# Patient Record
Sex: FEMALE | Race: WHITE | HISPANIC OR LATINO | Employment: UNEMPLOYED | URBAN - METROPOLITAN AREA
[De-identification: names, ages, dates, MRNs, and addresses within clinical notes are randomized per-mention and may not be internally consistent; named-entity substitution may affect disease eponyms.]

---

## 2017-01-03 ENCOUNTER — ALLSCRIPTS OFFICE VISIT (OUTPATIENT)
Dept: OTHER | Facility: OTHER | Age: 62
End: 2017-01-03

## 2017-01-03 ENCOUNTER — APPOINTMENT (OUTPATIENT)
Dept: LAB | Facility: HOSPITAL | Age: 62
End: 2017-01-03
Payer: COMMERCIAL

## 2017-01-03 DIAGNOSIS — N20.0 CALCULUS OF KIDNEY: ICD-10-CM

## 2017-01-03 LAB
BACTERIA UR QL AUTO: ABNORMAL /HPF
BILIRUB UR QL STRIP: NEGATIVE
BILIRUB UR QL STRIP: NEGATIVE
CLARITY UR: ABNORMAL
CLARITY UR: NORMAL
COLOR UR: ABNORMAL
COLOR UR: NORMAL
GLUCOSE (HISTORICAL): NEGATIVE
GLUCOSE UR STRIP-MCNC: NEGATIVE MG/DL
HGB UR QL STRIP.AUTO: ABNORMAL
HGB UR QL STRIP.AUTO: NORMAL
KETONES UR STRIP-MCNC: NEGATIVE MG/DL
KETONES UR STRIP-MCNC: NEGATIVE MG/DL
LEUKOCYTE ESTERASE UR QL STRIP: ABNORMAL
LEUKOCYTE ESTERASE UR QL STRIP: NORMAL
NITRITE UR QL STRIP: NEGATIVE
NITRITE UR QL STRIP: NEGATIVE
NON-SQ EPI CELLS URNS QL MICRO: ABNORMAL /HPF
PH UR STRIP.AUTO: 5.5 [PH] (ref 4.5–8)
PH UR STRIP.AUTO: 6 [PH]
PROT UR STRIP-MCNC: ABNORMAL MG/DL
PROT UR STRIP-MCNC: NORMAL MG/DL
RBC #/AREA URNS AUTO: ABNORMAL /HPF
SP GR UR STRIP.AUTO: 1.01 (ref 1–1.03)
SP GR UR STRIP.AUTO: 1.02
UROBILINOGEN UR QL STRIP.AUTO: 0.2
UROBILINOGEN UR QL STRIP.AUTO: 0.2 E.U./DL
WBC #/AREA URNS AUTO: ABNORMAL /HPF

## 2017-01-03 PROCEDURE — 87186 SC STD MICRODIL/AGAR DIL: CPT

## 2017-01-03 PROCEDURE — 87077 CULTURE AEROBIC IDENTIFY: CPT

## 2017-01-03 PROCEDURE — 81001 URINALYSIS AUTO W/SCOPE: CPT

## 2017-01-03 PROCEDURE — 87086 URINE CULTURE/COLONY COUNT: CPT

## 2017-01-05 LAB — BACTERIA UR CULT: NORMAL

## 2017-01-11 ENCOUNTER — HOSPITAL ENCOUNTER (OUTPATIENT)
Dept: NON INVASIVE DIAGNOSTICS | Facility: CLINIC | Age: 62
Discharge: HOME | End: 2017-01-11
Payer: COMMERCIAL

## 2017-01-11 DIAGNOSIS — R93.1 ABNORMAL ECHOCARDIOGRAM: ICD-10-CM

## 2017-01-11 LAB
CHEST PAIN STATEMENT: NORMAL
MAX DIASTOLIC BP: 78 MMHG
MAX HEART RATE: 101 BPM
MAX PREDICTED HEART RATE: 157 BPM
MAX. SYSTOLIC BP: 134 MMHG
PROTOCOL NAME: NORMAL
REASON FOR TERMINATION: NORMAL
TARGET HR FORMULA: NORMAL
TEST INDICATION: NORMAL
TIME IN EXERCISE PHASE: 180 S

## 2017-01-11 PROCEDURE — 78452 HT MUSCLE IMAGE SPECT MULT: CPT

## 2017-01-11 PROCEDURE — A9502 TC99M TETROFOSMIN: HCPCS

## 2017-01-11 PROCEDURE — 93017 CV STRESS TEST TRACING ONLY: CPT

## 2017-01-11 RX ADMIN — REGADENOSON 0.4 MG: 0.08 INJECTION, SOLUTION INTRAVENOUS at 14:30

## 2017-01-12 ENCOUNTER — ALLSCRIPTS OFFICE VISIT (OUTPATIENT)
Dept: OTHER | Facility: OTHER | Age: 62
End: 2017-01-12

## 2017-01-12 ENCOUNTER — GENERIC CONVERSION - ENCOUNTER (OUTPATIENT)
Dept: OTHER | Facility: OTHER | Age: 62
End: 2017-01-12

## 2017-01-16 ENCOUNTER — ALLSCRIPTS OFFICE VISIT (OUTPATIENT)
Dept: OTHER | Facility: OTHER | Age: 62
End: 2017-01-16

## 2017-02-10 ENCOUNTER — ALLSCRIPTS OFFICE VISIT (OUTPATIENT)
Dept: OTHER | Facility: OTHER | Age: 62
End: 2017-02-10

## 2017-02-13 ENCOUNTER — TRANSCRIBE ORDERS (OUTPATIENT)
Dept: ADMINISTRATIVE | Facility: HOSPITAL | Age: 62
End: 2017-02-13

## 2017-02-13 DIAGNOSIS — R91.1 COIN LESION: Primary | ICD-10-CM

## 2017-02-21 ENCOUNTER — OFFICE VISIT (OUTPATIENT)
Dept: LAB | Facility: CLINIC | Age: 62
End: 2017-02-21
Payer: COMMERCIAL

## 2017-02-21 ENCOUNTER — TRANSCRIBE ORDERS (OUTPATIENT)
Dept: LAB | Facility: CLINIC | Age: 62
End: 2017-02-21

## 2017-02-21 ENCOUNTER — APPOINTMENT (OUTPATIENT)
Dept: LAB | Facility: CLINIC | Age: 62
End: 2017-02-21
Payer: COMMERCIAL

## 2017-02-21 ENCOUNTER — APPOINTMENT (OUTPATIENT)
Dept: PREADMISSION TESTING | Facility: HOSPITAL | Age: 62
End: 2017-02-21
Payer: COMMERCIAL

## 2017-02-21 VITALS
HEIGHT: 62 IN | BODY MASS INDEX: 24.48 KG/M2 | WEIGHT: 133 LBS | HEART RATE: 65 BPM | DIASTOLIC BLOOD PRESSURE: 68 MMHG | SYSTOLIC BLOOD PRESSURE: 107 MMHG | TEMPERATURE: 98.1 F

## 2017-02-21 DIAGNOSIS — N20.0 URIC ACID NEPHROLITHIASIS: Primary | ICD-10-CM

## 2017-02-21 DIAGNOSIS — N20.0 URIC ACID NEPHROLITHIASIS: ICD-10-CM

## 2017-02-21 LAB
ANION GAP SERPL CALCULATED.3IONS-SCNC: 8 MMOL/L (ref 4–13)
APTT PPP: 44 SECONDS (ref 24–36)
ATRIAL RATE: 78 BPM
ATRIAL RATE: 78 BPM
BASOPHILS # BLD AUTO: 0.02 THOUSANDS/ΜL (ref 0–0.1)
BASOPHILS NFR BLD AUTO: 0 % (ref 0–1)
BUN SERPL-MCNC: 10 MG/DL (ref 5–25)
CALCIUM SERPL-MCNC: 9.3 MG/DL (ref 8.3–10.1)
CHLORIDE SERPL-SCNC: 97 MMOL/L (ref 100–108)
CO2 SERPL-SCNC: 28 MMOL/L (ref 21–32)
CREAT SERPL-MCNC: 1.05 MG/DL (ref 0.6–1.3)
EOSINOPHIL # BLD AUTO: 0.02 THOUSAND/ΜL (ref 0–0.61)
EOSINOPHIL NFR BLD AUTO: 0 % (ref 0–6)
ERYTHROCYTE [DISTWIDTH] IN BLOOD BY AUTOMATED COUNT: 14 % (ref 11.6–15.1)
GFR SERPL CREATININE-BSD FRML MDRD: 52.9 ML/MIN/1.73SQ M
GLUCOSE SERPL-MCNC: 298 MG/DL (ref 65–140)
HCT VFR BLD AUTO: 31.3 % (ref 34.8–46.1)
HGB BLD-MCNC: 9.9 G/DL (ref 11.5–15.4)
INR PPP: 1.17 (ref 0.86–1.16)
LYMPHOCYTES # BLD AUTO: 1.08 THOUSANDS/ΜL (ref 0.6–4.47)
LYMPHOCYTES NFR BLD AUTO: 10 % (ref 14–44)
MCH RBC QN AUTO: 27.3 PG (ref 26.8–34.3)
MCHC RBC AUTO-ENTMCNC: 31.6 G/DL (ref 31.4–37.4)
MCV RBC AUTO: 86 FL (ref 82–98)
MONOCYTES # BLD AUTO: 0.79 THOUSAND/ΜL (ref 0.17–1.22)
MONOCYTES NFR BLD AUTO: 7 % (ref 4–12)
NEUTROPHILS # BLD AUTO: 9.05 THOUSANDS/ΜL (ref 1.85–7.62)
NEUTS SEG NFR BLD AUTO: 83 % (ref 43–75)
P AXIS: 45 DEGREES
P AXIS: 45 DEGREES
PLATELET # BLD AUTO: 292 THOUSANDS/UL (ref 149–390)
PMV BLD AUTO: 11.1 FL (ref 8.9–12.7)
POTASSIUM SERPL-SCNC: 4.6 MMOL/L (ref 3.5–5.3)
PR INTERVAL: 152 MS
PR INTERVAL: 162 MS
PROTHROMBIN TIME: 14.6 SECONDS (ref 12–14.3)
QRS AXIS: 38 DEGREES
QRS AXIS: 40 DEGREES
QRSD INTERVAL: 78 MS
QRSD INTERVAL: 82 MS
QT INTERVAL: 428 MS
QT INTERVAL: 454 MS
QTC INTERVAL: 487 MS
QTC INTERVAL: 517 MS
RBC # BLD AUTO: 3.63 MILLION/UL (ref 3.81–5.12)
SODIUM SERPL-SCNC: 133 MMOL/L (ref 136–145)
T WAVE AXIS: 65 DEGREES
T WAVE AXIS: 72 DEGREES
VENTRICULAR RATE: 78 BPM
VENTRICULAR RATE: 78 BPM
WBC # BLD AUTO: 10.96 THOUSAND/UL (ref 4.31–10.16)

## 2017-02-21 PROCEDURE — 85730 THROMBOPLASTIN TIME PARTIAL: CPT

## 2017-02-21 PROCEDURE — 85610 PROTHROMBIN TIME: CPT

## 2017-02-21 PROCEDURE — 93005 ELECTROCARDIOGRAM TRACING: CPT

## 2017-02-21 PROCEDURE — 85025 COMPLETE CBC W/AUTO DIFF WBC: CPT

## 2017-02-21 PROCEDURE — 80048 BASIC METABOLIC PNL TOTAL CA: CPT

## 2017-02-21 PROCEDURE — 36415 COLL VENOUS BLD VENIPUNCTURE: CPT

## 2017-02-21 RX ORDER — NAPROXEN 500 MG/1
500 TABLET ORAL 2 TIMES DAILY WITH MEALS
COMMUNITY
End: 2017-04-09

## 2017-02-21 RX ORDER — OXYCODONE HYDROCHLORIDE 5 MG/1
5 TABLET ORAL EVERY 4 HOURS PRN
COMMUNITY
End: 2017-04-17 | Stop reason: ALTCHOICE

## 2017-02-21 RX ORDER — GABAPENTIN 800 MG/1
800 TABLET ORAL 3 TIMES DAILY
COMMUNITY

## 2017-02-21 RX ORDER — DICYCLOMINE HYDROCHLORIDE 10 MG/1
10 CAPSULE ORAL 2 TIMES DAILY
COMMUNITY

## 2017-02-21 RX ORDER — NABUMETONE 750 MG/1
750 TABLET, FILM COATED ORAL 2 TIMES DAILY
Status: ON HOLD | COMMUNITY
End: 2017-03-01 | Stop reason: ALTCHOICE

## 2017-02-21 RX ORDER — MIRTAZAPINE 7.5 MG/1
30 TABLET, FILM COATED ORAL
Status: ON HOLD | COMMUNITY
End: 2017-04-25

## 2017-02-21 RX ORDER — FUROSEMIDE 20 MG/1
20 TABLET ORAL 2 TIMES DAILY
COMMUNITY
End: 2017-04-25 | Stop reason: HOSPADM

## 2017-02-22 ENCOUNTER — TRANSCRIBE ORDERS (OUTPATIENT)
Dept: ADMINISTRATIVE | Facility: HOSPITAL | Age: 62
End: 2017-02-22

## 2017-02-22 ENCOUNTER — HOSPITAL ENCOUNTER (OUTPATIENT)
Dept: CT IMAGING | Facility: HOSPITAL | Age: 62
Discharge: HOME | End: 2017-02-22
Payer: COMMERCIAL

## 2017-02-22 DIAGNOSIS — R91.1 COIN LESION: ICD-10-CM

## 2017-02-22 PROCEDURE — 71250 CT THORAX DX C-: CPT

## 2017-02-24 ENCOUNTER — ALLSCRIPTS OFFICE VISIT (OUTPATIENT)
Dept: OTHER | Facility: OTHER | Age: 62
End: 2017-02-24

## 2017-03-01 ENCOUNTER — HOSPITAL ENCOUNTER (OUTPATIENT)
Facility: HOSPITAL | Age: 62
Setting detail: OUTPATIENT SURGERY
Discharge: HOME | End: 2017-03-01
Attending: UROLOGY | Admitting: UROLOGY
Payer: COMMERCIAL

## 2017-03-01 ENCOUNTER — APPOINTMENT (OUTPATIENT)
Dept: RADIOLOGY | Facility: HOSPITAL | Age: 62
End: 2017-03-01
Payer: COMMERCIAL

## 2017-03-01 ENCOUNTER — ANESTHESIA EVENT (OUTPATIENT)
Dept: PERIOP | Facility: HOSPITAL | Age: 62
End: 2017-03-01
Payer: COMMERCIAL

## 2017-03-01 ENCOUNTER — ANESTHESIA (OUTPATIENT)
Dept: PERIOP | Facility: HOSPITAL | Age: 62
End: 2017-03-01
Payer: COMMERCIAL

## 2017-03-01 VITALS
HEIGHT: 62 IN | SYSTOLIC BLOOD PRESSURE: 147 MMHG | DIASTOLIC BLOOD PRESSURE: 70 MMHG | HEART RATE: 72 BPM | TEMPERATURE: 97.1 F | RESPIRATION RATE: 20 BRPM | OXYGEN SATURATION: 97 %

## 2017-03-01 DIAGNOSIS — N20.0 CALCULUS OF KIDNEY: ICD-10-CM

## 2017-03-01 LAB
GLUCOSE SERPL-MCNC: 337 MG/DL (ref 65–140)
GLUCOSE SERPL-MCNC: 338 MG/DL (ref 65–140)
GLUCOSE SERPL-MCNC: 93 MG/DL (ref 65–140)

## 2017-03-01 PROCEDURE — 71010 HB CHEST X-RAY 1 VIEW FRONTAL (PORTABLE): CPT

## 2017-03-01 PROCEDURE — 74420 UROGRAPHY RTRGR +-KUB: CPT

## 2017-03-01 PROCEDURE — 74000 HB X-RAY EXAM OF ABDOMEN (SINGLE ANTEROPOSTERIOR VIEW): CPT

## 2017-03-01 PROCEDURE — C1758 CATHETER, URETERAL: HCPCS | Performed by: UROLOGY

## 2017-03-01 PROCEDURE — C2617 STENT, NON-COR, TEM W/O DEL: HCPCS | Performed by: UROLOGY

## 2017-03-01 PROCEDURE — C1769 GUIDE WIRE: HCPCS | Performed by: UROLOGY

## 2017-03-01 PROCEDURE — 82948 REAGENT STRIP/BLOOD GLUCOSE: CPT

## 2017-03-01 PROCEDURE — 82360 CALCULUS ASSAY QUANT: CPT | Performed by: UROLOGY

## 2017-03-01 DEVICE — STENT URETERAL 6FR 24CML INLAY OPTIMA: Type: IMPLANTABLE DEVICE | Site: URETER | Status: FUNCTIONAL

## 2017-03-01 RX ORDER — HYDROCODONE BITARTRATE AND ACETAMINOPHEN 5; 325 MG/1; MG/1
TABLET ORAL
Status: DISCONTINUED
Start: 2017-03-01 | End: 2017-03-01 | Stop reason: HOSPADM

## 2017-03-01 RX ORDER — SODIUM CHLORIDE, SODIUM LACTATE, POTASSIUM CHLORIDE, CALCIUM CHLORIDE 600; 310; 30; 20 MG/100ML; MG/100ML; MG/100ML; MG/100ML
50 INJECTION, SOLUTION INTRAVENOUS CONTINUOUS
Status: DISCONTINUED | OUTPATIENT
Start: 2017-03-01 | End: 2017-03-01 | Stop reason: HOSPADM

## 2017-03-01 RX ORDER — FENTANYL CITRATE 50 UG/ML
INJECTION, SOLUTION INTRAMUSCULAR; INTRAVENOUS AS NEEDED
Status: DISCONTINUED | OUTPATIENT
Start: 2017-03-01 | End: 2017-03-01 | Stop reason: SURG

## 2017-03-01 RX ORDER — SODIUM CHLORIDE, SODIUM LACTATE, POTASSIUM CHLORIDE, CALCIUM CHLORIDE 600; 310; 30; 20 MG/100ML; MG/100ML; MG/100ML; MG/100ML
INJECTION, SOLUTION INTRAVENOUS CONTINUOUS PRN
Status: DISCONTINUED | OUTPATIENT
Start: 2017-03-01 | End: 2017-03-01 | Stop reason: SURG

## 2017-03-01 RX ORDER — MAGNESIUM HYDROXIDE 1200 MG/15ML
LIQUID ORAL AS NEEDED
Status: DISCONTINUED | OUTPATIENT
Start: 2017-03-01 | End: 2017-03-01 | Stop reason: HOSPADM

## 2017-03-01 RX ORDER — LIDOCAINE HYDROCHLORIDE 10 MG/ML
INJECTION, SOLUTION EPIDURAL; INFILTRATION; INTRACAUDAL; PERINEURAL
Status: DISCONTINUED
Start: 2017-03-01 | End: 2017-03-01 | Stop reason: HOSPADM

## 2017-03-01 RX ORDER — ONDANSETRON 2 MG/ML
INJECTION INTRAMUSCULAR; INTRAVENOUS AS NEEDED
Status: DISCONTINUED | OUTPATIENT
Start: 2017-03-01 | End: 2017-03-01 | Stop reason: SURG

## 2017-03-01 RX ORDER — ONDANSETRON 2 MG/ML
4 INJECTION INTRAMUSCULAR; INTRAVENOUS EVERY 4 HOURS PRN
Status: DISCONTINUED | OUTPATIENT
Start: 2017-03-01 | End: 2017-03-01 | Stop reason: HOSPADM

## 2017-03-01 RX ORDER — PROPOFOL 10 MG/ML
INJECTION, EMULSION INTRAVENOUS AS NEEDED
Status: DISCONTINUED | OUTPATIENT
Start: 2017-03-01 | End: 2017-03-01 | Stop reason: SURG

## 2017-03-01 RX ORDER — FENTANYL CITRATE/PF 50 MCG/ML
25 SYRINGE (ML) INJECTION
Status: COMPLETED | OUTPATIENT
Start: 2017-03-01 | End: 2017-03-01

## 2017-03-01 RX ORDER — HYDROCODONE BITARTRATE AND ACETAMINOPHEN 5; 325 MG/1; MG/1
1 TABLET ORAL EVERY 4 HOURS PRN
Status: DISCONTINUED | OUTPATIENT
Start: 2017-03-01 | End: 2017-03-01 | Stop reason: HOSPADM

## 2017-03-01 RX ORDER — ALBUTEROL SULFATE 2.5 MG/3ML
2.5 SOLUTION RESPIRATORY (INHALATION) ONCE
Status: COMPLETED | OUTPATIENT
Start: 2017-03-01 | End: 2017-03-01

## 2017-03-01 RX ORDER — AMMONIUM LACTATE 12 G/100G
LOTION TOPICAL AS NEEDED
COMMUNITY

## 2017-03-01 RX ORDER — CEPHALEXIN 500 MG/1
500 CAPSULE ORAL 4 TIMES DAILY
Qty: 12 CAPSULE | Refills: 0 | Status: SHIPPED | OUTPATIENT
Start: 2017-03-01 | End: 2017-03-04

## 2017-03-01 RX ORDER — METOPROLOL SUCCINATE 25 MG/1
25 TABLET, EXTENDED RELEASE ORAL 2 TIMES DAILY
COMMUNITY
End: 2017-04-03

## 2017-03-01 RX ORDER — PANTOPRAZOLE SODIUM 40 MG/1
40 TABLET, DELAYED RELEASE ORAL DAILY
COMMUNITY
End: 2017-04-17

## 2017-03-01 RX ORDER — TRAMADOL HYDROCHLORIDE 50 MG/1
50 TABLET ORAL EVERY 6 HOURS PRN
COMMUNITY
End: 2017-04-09

## 2017-03-01 RX ORDER — HYDROCODONE BITARTRATE AND ACETAMINOPHEN 5; 325 MG/1; MG/1
1 TABLET ORAL EVERY 4 HOURS PRN
Qty: 30 TABLET | Refills: 0 | Status: SHIPPED | OUTPATIENT
Start: 2017-03-01 | End: 2017-03-11

## 2017-03-01 RX ADMIN — SODIUM CHLORIDE, SODIUM LACTATE, POTASSIUM CHLORIDE, AND CALCIUM CHLORIDE: .6; .31; .03; .02 INJECTION, SOLUTION INTRAVENOUS at 12:54

## 2017-03-01 RX ADMIN — HYDROCODONE BITARTRATE AND ACETAMINOPHEN 1 TABLET: 5; 325 TABLET ORAL at 16:16

## 2017-03-01 RX ADMIN — FENTANYL CITRATE 25 MCG: 50 INJECTION INTRAMUSCULAR; INTRAVENOUS at 13:52

## 2017-03-01 RX ADMIN — FENTANYL CITRATE 25 MCG: 50 INJECTION INTRAMUSCULAR; INTRAVENOUS at 13:36

## 2017-03-01 RX ADMIN — FENTANYL CITRATE 25 MCG: 50 INJECTION, SOLUTION INTRAMUSCULAR; INTRAVENOUS at 15:14

## 2017-03-01 RX ADMIN — INSULIN HUMAN 6 UNITS: 100 INJECTION, SOLUTION PARENTERAL at 13:23

## 2017-03-01 RX ADMIN — PROPOFOL 150 MG: 10 INJECTION, EMULSION INTRAVENOUS at 13:06

## 2017-03-01 RX ADMIN — FENTANYL CITRATE 25 MCG: 50 INJECTION INTRAMUSCULAR; INTRAVENOUS at 13:12

## 2017-03-01 RX ADMIN — FENTANYL CITRATE 25 MCG: 50 INJECTION, SOLUTION INTRAMUSCULAR; INTRAVENOUS at 15:21

## 2017-03-01 RX ADMIN — ONDANSETRON 4 MG: 2 INJECTION INTRAMUSCULAR; INTRAVENOUS at 15:00

## 2017-03-01 RX ADMIN — FENTANYL CITRATE 25 MCG: 50 INJECTION INTRAMUSCULAR; INTRAVENOUS at 13:15

## 2017-03-01 RX ADMIN — PROPOFOL 100 MG: 10 INJECTION, EMULSION INTRAVENOUS at 13:36

## 2017-03-01 RX ADMIN — PROPOFOL 50 MG: 10 INJECTION, EMULSION INTRAVENOUS at 13:52

## 2017-03-01 RX ADMIN — HEPARIN 300 UNITS: 100 SYRINGE at 17:01

## 2017-03-01 RX ADMIN — ALBUTEROL SULFATE 2.5 MG: 2.5 SOLUTION RESPIRATORY (INHALATION) at 15:23

## 2017-03-01 RX ADMIN — ONDANSETRON 4 MG: 2 INJECTION INTRAMUSCULAR; INTRAVENOUS at 13:15

## 2017-03-01 RX ADMIN — PROPOFOL 50 MG: 10 INJECTION, EMULSION INTRAVENOUS at 13:12

## 2017-03-01 RX ADMIN — FENTANYL CITRATE 25 MCG: 50 INJECTION, SOLUTION INTRAMUSCULAR; INTRAVENOUS at 15:26

## 2017-03-01 RX ADMIN — FENTANYL CITRATE 25 MCG: 50 INJECTION, SOLUTION INTRAMUSCULAR; INTRAVENOUS at 15:36

## 2017-03-03 DIAGNOSIS — N20.0 CALCULUS OF KIDNEY: ICD-10-CM

## 2017-03-03 DIAGNOSIS — Z12.31 ENCOUNTER FOR SCREENING MAMMOGRAM FOR MALIGNANT NEOPLASM OF BREAST: ICD-10-CM

## 2017-03-07 LAB
CA PHOS MFR STONE: 5 %
CALCIUM OXALATE DIHYDRATE MFR STONE IR: 3 %
COLOR STONE: NORMAL
COM MFR STONE: 92 %
COMMENT-STONE3: NORMAL
COMPOSITION: NORMAL
LABORATORY COMMENT REPORT: NORMAL
NIDUS STONE QL: NORMAL
PHOTO: NORMAL
SIZE STONE: NORMAL MM
STONE ANALYSIS-IMP: NORMAL
STONE ANALYSIS-IMP: NORMAL
WT STONE: 11 MG

## 2017-03-24 ENCOUNTER — ALLSCRIPTS OFFICE VISIT (OUTPATIENT)
Dept: OTHER | Facility: OTHER | Age: 62
End: 2017-03-24

## 2017-04-03 ENCOUNTER — APPOINTMENT (EMERGENCY)
Dept: CT IMAGING | Facility: HOSPITAL | Age: 62
End: 2017-04-03
Payer: COMMERCIAL

## 2017-04-03 ENCOUNTER — HOSPITAL ENCOUNTER (EMERGENCY)
Facility: HOSPITAL | Age: 62
Discharge: HOME | End: 2017-04-03
Attending: EMERGENCY MEDICINE
Payer: COMMERCIAL

## 2017-04-03 VITALS
TEMPERATURE: 98 F | RESPIRATION RATE: 18 BRPM | SYSTOLIC BLOOD PRESSURE: 159 MMHG | BODY MASS INDEX: 23.83 KG/M2 | DIASTOLIC BLOOD PRESSURE: 76 MMHG | WEIGHT: 130.29 LBS | HEART RATE: 78 BPM | OXYGEN SATURATION: 98 %

## 2017-04-03 DIAGNOSIS — N28.9 ACUTE RENAL INSUFFICIENCY: ICD-10-CM

## 2017-04-03 DIAGNOSIS — N20.1 RIGHT URETERAL STONE: Primary | ICD-10-CM

## 2017-04-03 DIAGNOSIS — N39.0 ACUTE UTI: ICD-10-CM

## 2017-04-03 LAB
ALBUMIN SERPL BCP-MCNC: 2.5 G/DL (ref 3.5–5)
ALP SERPL-CCNC: 190 U/L (ref 46–116)
ALT SERPL W P-5'-P-CCNC: 22 U/L (ref 12–78)
ANION GAP SERPL CALCULATED.3IONS-SCNC: 12 MMOL/L (ref 4–13)
APTT PPP: 41 SECONDS (ref 24–36)
AST SERPL W P-5'-P-CCNC: 30 U/L (ref 5–45)
BACTERIA UR QL AUTO: ABNORMAL /HPF
BASOPHILS # BLD AUTO: 0.02 THOUSANDS/ΜL (ref 0–0.1)
BASOPHILS NFR BLD AUTO: 0 % (ref 0–1)
BILIRUB SERPL-MCNC: 0.4 MG/DL (ref 0.2–1)
BILIRUB UR QL STRIP: NEGATIVE
BUN SERPL-MCNC: 15 MG/DL (ref 5–25)
CALCIUM SERPL-MCNC: 8.3 MG/DL (ref 8.3–10.1)
CHLORIDE SERPL-SCNC: 103 MMOL/L (ref 100–108)
CLARITY UR: ABNORMAL
CO2 SERPL-SCNC: 21 MMOL/L (ref 21–32)
COLOR UR: YELLOW
CREAT SERPL-MCNC: 1.23 MG/DL (ref 0.6–1.3)
EOSINOPHIL # BLD AUTO: 0.07 THOUSAND/ΜL (ref 0–0.61)
EOSINOPHIL NFR BLD AUTO: 1 % (ref 0–6)
ERYTHROCYTE [DISTWIDTH] IN BLOOD BY AUTOMATED COUNT: 16.4 % (ref 11.6–15.1)
GFR SERPL CREATININE-BSD FRML MDRD: 44.1 ML/MIN/1.73SQ M
GLUCOSE SERPL-MCNC: 254 MG/DL (ref 65–140)
GLUCOSE UR STRIP-MCNC: ABNORMAL MG/DL
HCT VFR BLD AUTO: 31.4 % (ref 34.8–46.1)
HGB BLD-MCNC: 10 G/DL (ref 11.5–15.4)
HGB UR QL STRIP.AUTO: ABNORMAL
INR PPP: 1.04 (ref 0.86–1.16)
KETONES UR STRIP-MCNC: NEGATIVE MG/DL
LEUKOCYTE ESTERASE UR QL STRIP: ABNORMAL
LYMPHOCYTES # BLD AUTO: 1.38 THOUSANDS/ΜL (ref 0.6–4.47)
LYMPHOCYTES NFR BLD AUTO: 25 % (ref 14–44)
MCH RBC QN AUTO: 26.8 PG (ref 26.8–34.3)
MCHC RBC AUTO-ENTMCNC: 31.8 G/DL (ref 31.4–37.4)
MCV RBC AUTO: 84 FL (ref 82–98)
MONOCYTES # BLD AUTO: 0.36 THOUSAND/ΜL (ref 0.17–1.22)
MONOCYTES NFR BLD AUTO: 7 % (ref 4–12)
NEUTROPHILS # BLD AUTO: 3.72 THOUSANDS/ΜL (ref 1.85–7.62)
NEUTS SEG NFR BLD AUTO: 67 % (ref 43–75)
NITRITE UR QL STRIP: NEGATIVE
NON-SQ EPI CELLS URNS QL MICRO: ABNORMAL /HPF
PH UR STRIP.AUTO: 5.5 [PH] (ref 4.5–8)
PLATELET # BLD AUTO: 170 THOUSANDS/UL (ref 149–390)
PMV BLD AUTO: 10.3 FL (ref 8.9–12.7)
POTASSIUM SERPL-SCNC: 4.9 MMOL/L (ref 3.5–5.3)
PROT SERPL-MCNC: 7.3 G/DL (ref 6.4–8.2)
PROT UR STRIP-MCNC: ABNORMAL MG/DL
PROTHROMBIN TIME: 13.4 SECONDS (ref 12–14.3)
RBC # BLD AUTO: 3.73 MILLION/UL (ref 3.81–5.12)
RBC #/AREA URNS AUTO: ABNORMAL /HPF
SODIUM SERPL-SCNC: 136 MMOL/L (ref 136–145)
SP GR UR STRIP.AUTO: 1.02 (ref 1–1.03)
UROBILINOGEN UR QL STRIP.AUTO: 0.2 E.U./DL
WBC # BLD AUTO: 5.55 THOUSAND/UL (ref 4.31–10.16)
WBC #/AREA URNS AUTO: ABNORMAL /HPF

## 2017-04-03 PROCEDURE — 96375 TX/PRO/DX INJ NEW DRUG ADDON: CPT

## 2017-04-03 PROCEDURE — 96374 THER/PROPH/DIAG INJ IV PUSH: CPT

## 2017-04-03 PROCEDURE — 74176 CT ABD & PELVIS W/O CONTRAST: CPT

## 2017-04-03 PROCEDURE — 36415 COLL VENOUS BLD VENIPUNCTURE: CPT | Performed by: EMERGENCY MEDICINE

## 2017-04-03 PROCEDURE — 96376 TX/PRO/DX INJ SAME DRUG ADON: CPT

## 2017-04-03 PROCEDURE — 87086 URINE CULTURE/COLONY COUNT: CPT | Performed by: EMERGENCY MEDICINE

## 2017-04-03 PROCEDURE — 85610 PROTHROMBIN TIME: CPT | Performed by: EMERGENCY MEDICINE

## 2017-04-03 PROCEDURE — 81001 URINALYSIS AUTO W/SCOPE: CPT | Performed by: EMERGENCY MEDICINE

## 2017-04-03 PROCEDURE — 85025 COMPLETE CBC W/AUTO DIFF WBC: CPT | Performed by: EMERGENCY MEDICINE

## 2017-04-03 PROCEDURE — 99284 EMERGENCY DEPT VISIT MOD MDM: CPT

## 2017-04-03 PROCEDURE — 80053 COMPREHEN METABOLIC PANEL: CPT | Performed by: EMERGENCY MEDICINE

## 2017-04-03 PROCEDURE — 85730 THROMBOPLASTIN TIME PARTIAL: CPT | Performed by: EMERGENCY MEDICINE

## 2017-04-03 PROCEDURE — 96361 HYDRATE IV INFUSION ADD-ON: CPT

## 2017-04-03 RX ORDER — TAMSULOSIN HYDROCHLORIDE 0.4 MG/1
0.4 CAPSULE ORAL
Qty: 7 CAPSULE | Refills: 0 | Status: SHIPPED | OUTPATIENT
Start: 2017-04-03 | End: 2017-04-23

## 2017-04-03 RX ORDER — MORPHINE SULFATE 4 MG/ML
4 INJECTION, SOLUTION INTRAMUSCULAR; INTRAVENOUS ONCE
Status: COMPLETED | OUTPATIENT
Start: 2017-04-03 | End: 2017-04-03

## 2017-04-03 RX ORDER — CEPHALEXIN 500 MG/1
500 CAPSULE ORAL ONCE
Status: COMPLETED | OUTPATIENT
Start: 2017-04-03 | End: 2017-04-03

## 2017-04-03 RX ORDER — OXYCODONE HYDROCHLORIDE AND ACETAMINOPHEN 5; 325 MG/1; MG/1
1 TABLET ORAL EVERY 6 HOURS PRN
Qty: 12 TABLET | Refills: 0 | Status: SHIPPED | OUTPATIENT
Start: 2017-04-03 | End: 2017-04-09

## 2017-04-03 RX ORDER — ONDANSETRON 2 MG/ML
4 INJECTION INTRAMUSCULAR; INTRAVENOUS ONCE
Status: COMPLETED | OUTPATIENT
Start: 2017-04-03 | End: 2017-04-03

## 2017-04-03 RX ORDER — CEPHALEXIN 500 MG/1
500 CAPSULE ORAL 4 TIMES DAILY
Qty: 40 CAPSULE | Refills: 0 | Status: SHIPPED | OUTPATIENT
Start: 2017-04-03 | End: 2017-04-13

## 2017-04-03 RX ADMIN — HEPARIN 300 UNITS: 100 SYRINGE at 15:57

## 2017-04-03 RX ADMIN — CEPHALEXIN 500 MG: 500 CAPSULE ORAL at 15:55

## 2017-04-03 RX ADMIN — MORPHINE SULFATE 4 MG: 4 INJECTION, SOLUTION INTRAMUSCULAR; INTRAVENOUS at 12:29

## 2017-04-03 RX ADMIN — SODIUM CHLORIDE 1000 ML: 0.9 INJECTION, SOLUTION INTRAVENOUS at 12:48

## 2017-04-03 RX ADMIN — MORPHINE SULFATE 4 MG: 4 INJECTION, SOLUTION INTRAMUSCULAR; INTRAVENOUS at 13:56

## 2017-04-03 RX ADMIN — ONDANSETRON 4 MG: 2 INJECTION INTRAMUSCULAR; INTRAVENOUS at 12:27

## 2017-04-04 LAB — BACTERIA UR CULT: NORMAL

## 2017-04-09 ENCOUNTER — APPOINTMENT (EMERGENCY)
Dept: ULTRASOUND IMAGING | Facility: HOSPITAL | Age: 62
End: 2017-04-09
Payer: COMMERCIAL

## 2017-04-09 ENCOUNTER — HOSPITAL ENCOUNTER (EMERGENCY)
Facility: HOSPITAL | Age: 62
Discharge: HOME | End: 2017-04-09
Attending: EMERGENCY MEDICINE
Payer: COMMERCIAL

## 2017-04-09 VITALS
WEIGHT: 137.79 LBS | DIASTOLIC BLOOD PRESSURE: 79 MMHG | HEART RATE: 89 BPM | OXYGEN SATURATION: 98 % | TEMPERATURE: 98.4 F | SYSTOLIC BLOOD PRESSURE: 128 MMHG | BODY MASS INDEX: 25.2 KG/M2 | RESPIRATION RATE: 18 BRPM

## 2017-04-09 DIAGNOSIS — N23 RENAL COLIC ON RIGHT SIDE: Primary | ICD-10-CM

## 2017-04-09 DIAGNOSIS — R31.9 HEMATURIA: ICD-10-CM

## 2017-04-09 DIAGNOSIS — N20.0 KIDNEY STONE ON RIGHT SIDE: ICD-10-CM

## 2017-04-09 LAB
ALBUMIN SERPL BCP-MCNC: 2.7 G/DL (ref 3.5–5)
ALP SERPL-CCNC: 235 U/L (ref 46–116)
ALT SERPL W P-5'-P-CCNC: 22 U/L (ref 12–78)
ANION GAP SERPL CALCULATED.3IONS-SCNC: 10 MMOL/L (ref 4–13)
APTT PPP: 52 SECONDS (ref 24–36)
AST SERPL W P-5'-P-CCNC: 18 U/L (ref 5–45)
BACTERIA UR QL AUTO: ABNORMAL /HPF
BASOPHILS # BLD AUTO: 0.02 THOUSANDS/ΜL (ref 0–0.1)
BASOPHILS NFR BLD AUTO: 0 % (ref 0–1)
BILIRUB SERPL-MCNC: 0.2 MG/DL (ref 0.2–1)
BILIRUB UR QL STRIP: NEGATIVE
BUN SERPL-MCNC: 12 MG/DL (ref 5–25)
CALCIUM SERPL-MCNC: 8.8 MG/DL (ref 8.3–10.1)
CHLORIDE SERPL-SCNC: 101 MMOL/L (ref 100–108)
CLARITY UR: ABNORMAL
CO2 SERPL-SCNC: 25 MMOL/L (ref 21–32)
COLOR UR: YELLOW
CREAT SERPL-MCNC: 1.06 MG/DL (ref 0.6–1.3)
EOSINOPHIL # BLD AUTO: 0.1 THOUSAND/ΜL (ref 0–0.61)
EOSINOPHIL NFR BLD AUTO: 2 % (ref 0–6)
ERYTHROCYTE [DISTWIDTH] IN BLOOD BY AUTOMATED COUNT: 16 % (ref 11.6–15.1)
GFR SERPL CREATININE-BSD FRML MDRD: 52.4 ML/MIN/1.73SQ M
GLUCOSE SERPL-MCNC: 288 MG/DL (ref 65–140)
GLUCOSE UR STRIP-MCNC: ABNORMAL MG/DL
HCT VFR BLD AUTO: 31.3 % (ref 34.8–46.1)
HGB BLD-MCNC: 10.2 G/DL (ref 11.5–15.4)
HGB UR QL STRIP.AUTO: ABNORMAL
INR PPP: 1 (ref 0.86–1.16)
KETONES UR STRIP-MCNC: NEGATIVE MG/DL
LEUKOCYTE ESTERASE UR QL STRIP: ABNORMAL
LYMPHOCYTES # BLD AUTO: 1.69 THOUSANDS/ΜL (ref 0.6–4.47)
LYMPHOCYTES NFR BLD AUTO: 26 % (ref 14–44)
MCH RBC QN AUTO: 27.1 PG (ref 26.8–34.3)
MCHC RBC AUTO-ENTMCNC: 32.6 G/DL (ref 31.4–37.4)
MCV RBC AUTO: 83 FL (ref 82–98)
MONOCYTES # BLD AUTO: 0.48 THOUSAND/ΜL (ref 0.17–1.22)
MONOCYTES NFR BLD AUTO: 7 % (ref 4–12)
NEUTROPHILS # BLD AUTO: 4.23 THOUSANDS/ΜL (ref 1.85–7.62)
NEUTS SEG NFR BLD AUTO: 65 % (ref 43–75)
NITRITE UR QL STRIP: NEGATIVE
NON-SQ EPI CELLS URNS QL MICRO: ABNORMAL /HPF
PH UR STRIP.AUTO: 6 [PH] (ref 4.5–8)
PLATELET # BLD AUTO: 171 THOUSANDS/UL (ref 149–390)
PMV BLD AUTO: 10.8 FL (ref 8.9–12.7)
POTASSIUM SERPL-SCNC: 4.8 MMOL/L (ref 3.5–5.3)
PROT SERPL-MCNC: 7.5 G/DL (ref 6.4–8.2)
PROT UR STRIP-MCNC: ABNORMAL MG/DL
PROTHROMBIN TIME: 13 SECONDS (ref 12–14.3)
RBC # BLD AUTO: 3.77 MILLION/UL (ref 3.81–5.12)
RBC #/AREA URNS AUTO: ABNORMAL /HPF
SODIUM SERPL-SCNC: 136 MMOL/L (ref 136–145)
SP GR UR STRIP.AUTO: >=1.03 (ref 1–1.03)
UROBILINOGEN UR QL STRIP.AUTO: 0.2 E.U./DL
WBC # BLD AUTO: 6.52 THOUSAND/UL (ref 4.31–10.16)
WBC #/AREA URNS AUTO: ABNORMAL /HPF

## 2017-04-09 PROCEDURE — 85025 COMPLETE CBC W/AUTO DIFF WBC: CPT | Performed by: EMERGENCY MEDICINE

## 2017-04-09 PROCEDURE — 36415 COLL VENOUS BLD VENIPUNCTURE: CPT | Performed by: EMERGENCY MEDICINE

## 2017-04-09 PROCEDURE — 87086 URINE CULTURE/COLONY COUNT: CPT | Performed by: EMERGENCY MEDICINE

## 2017-04-09 PROCEDURE — 76770 US EXAM ABDO BACK WALL COMP: CPT

## 2017-04-09 PROCEDURE — 96361 HYDRATE IV INFUSION ADD-ON: CPT

## 2017-04-09 PROCEDURE — 99284 EMERGENCY DEPT VISIT MOD MDM: CPT

## 2017-04-09 PROCEDURE — 85610 PROTHROMBIN TIME: CPT | Performed by: EMERGENCY MEDICINE

## 2017-04-09 PROCEDURE — 96374 THER/PROPH/DIAG INJ IV PUSH: CPT

## 2017-04-09 PROCEDURE — 96375 TX/PRO/DX INJ NEW DRUG ADDON: CPT

## 2017-04-09 PROCEDURE — 80053 COMPREHEN METABOLIC PANEL: CPT | Performed by: EMERGENCY MEDICINE

## 2017-04-09 PROCEDURE — 85730 THROMBOPLASTIN TIME PARTIAL: CPT | Performed by: EMERGENCY MEDICINE

## 2017-04-09 PROCEDURE — 81001 URINALYSIS AUTO W/SCOPE: CPT | Performed by: EMERGENCY MEDICINE

## 2017-04-09 RX ORDER — OXYCODONE HYDROCHLORIDE AND ACETAMINOPHEN 5; 325 MG/1; MG/1
1 TABLET ORAL EVERY 6 HOURS PRN
Qty: 10 TABLET | Refills: 0 | Status: SHIPPED | OUTPATIENT
Start: 2017-04-09 | End: 2017-04-12

## 2017-04-09 RX ORDER — ONDANSETRON 2 MG/ML
4 INJECTION INTRAMUSCULAR; INTRAVENOUS ONCE
Status: COMPLETED | OUTPATIENT
Start: 2017-04-09 | End: 2017-04-09

## 2017-04-09 RX ORDER — MORPHINE SULFATE 4 MG/ML
4 INJECTION, SOLUTION INTRAMUSCULAR; INTRAVENOUS ONCE
Status: COMPLETED | OUTPATIENT
Start: 2017-04-09 | End: 2017-04-09

## 2017-04-09 RX ORDER — KETOROLAC TROMETHAMINE 30 MG/ML
30 INJECTION, SOLUTION INTRAMUSCULAR; INTRAVENOUS ONCE
Status: COMPLETED | OUTPATIENT
Start: 2017-04-09 | End: 2017-04-09

## 2017-04-09 RX ORDER — NAPROXEN 500 MG/1
500 TABLET ORAL 2 TIMES DAILY PRN
Qty: 30 TABLET | Refills: 0 | Status: SHIPPED | OUTPATIENT
Start: 2017-04-09 | End: 2017-04-25 | Stop reason: HOSPADM

## 2017-04-09 RX ADMIN — MORPHINE SULFATE 4 MG: 4 INJECTION, SOLUTION INTRAMUSCULAR; INTRAVENOUS at 14:54

## 2017-04-09 RX ADMIN — HEPARIN 300 UNITS: 100 SYRINGE at 16:53

## 2017-04-09 RX ADMIN — ONDANSETRON 4 MG: 2 INJECTION INTRAMUSCULAR; INTRAVENOUS at 14:54

## 2017-04-09 RX ADMIN — SODIUM CHLORIDE 1000 ML: 0.9 INJECTION, SOLUTION INTRAVENOUS at 14:54

## 2017-04-09 RX ADMIN — KETOROLAC TROMETHAMINE 30 MG: 30 INJECTION, SOLUTION INTRAMUSCULAR at 16:52

## 2017-04-11 LAB — BACTERIA UR CULT: NORMAL

## 2017-04-17 ENCOUNTER — APPOINTMENT (EMERGENCY)
Dept: CT IMAGING | Facility: HOSPITAL | Age: 62
End: 2017-04-17
Payer: COMMERCIAL

## 2017-04-17 ENCOUNTER — HOSPITAL ENCOUNTER (EMERGENCY)
Facility: HOSPITAL | Age: 62
Discharge: HOME | End: 2017-04-17
Attending: EMERGENCY MEDICINE | Admitting: EMERGENCY MEDICINE
Payer: COMMERCIAL

## 2017-04-17 VITALS
DIASTOLIC BLOOD PRESSURE: 86 MMHG | HEART RATE: 88 BPM | BODY MASS INDEX: 24.56 KG/M2 | RESPIRATION RATE: 18 BRPM | OXYGEN SATURATION: 97 % | WEIGHT: 134.26 LBS | TEMPERATURE: 97.8 F | SYSTOLIC BLOOD PRESSURE: 134 MMHG

## 2017-04-17 DIAGNOSIS — J18.9 PNEUMONIA: ICD-10-CM

## 2017-04-17 DIAGNOSIS — R10.9 RIGHT FLANK PAIN: Primary | ICD-10-CM

## 2017-04-17 LAB
ALBUMIN SERPL BCP-MCNC: 2.3 G/DL (ref 3.5–5)
ALP SERPL-CCNC: 173 U/L (ref 46–116)
ALT SERPL W P-5'-P-CCNC: 17 U/L (ref 12–78)
ANION GAP SERPL CALCULATED.3IONS-SCNC: 10 MMOL/L (ref 4–13)
AST SERPL W P-5'-P-CCNC: 18 U/L (ref 5–45)
BACTERIA UR QL AUTO: ABNORMAL /HPF
BASOPHILS # BLD AUTO: 0.02 THOUSANDS/ΜL (ref 0–0.1)
BASOPHILS NFR BLD AUTO: 0 % (ref 0–1)
BILIRUB SERPL-MCNC: 0.1 MG/DL (ref 0.2–1)
BILIRUB UR QL STRIP: NEGATIVE
BUN SERPL-MCNC: 13 MG/DL (ref 5–25)
CALCIUM SERPL-MCNC: 8.3 MG/DL (ref 8.3–10.1)
CHLORIDE SERPL-SCNC: 107 MMOL/L (ref 100–108)
CLARITY UR: ABNORMAL
CO2 SERPL-SCNC: 22 MMOL/L (ref 21–32)
COLOR UR: YELLOW
CREAT SERPL-MCNC: 1.28 MG/DL (ref 0.6–1.3)
EOSINOPHIL # BLD AUTO: 0.12 THOUSAND/ΜL (ref 0–0.61)
EOSINOPHIL NFR BLD AUTO: 3 % (ref 0–6)
ERYTHROCYTE [DISTWIDTH] IN BLOOD BY AUTOMATED COUNT: 16.6 % (ref 11.6–15.1)
GFR SERPL CREATININE-BSD FRML MDRD: 42.1 ML/MIN/1.73SQ M
GLUCOSE SERPL-MCNC: 71 MG/DL (ref 65–140)
GLUCOSE UR STRIP-MCNC: NEGATIVE MG/DL
HCT VFR BLD AUTO: 29.8 % (ref 34.8–46.1)
HGB BLD-MCNC: 9.5 G/DL (ref 11.5–15.4)
HGB UR QL STRIP.AUTO: ABNORMAL
KETONES UR STRIP-MCNC: NEGATIVE MG/DL
LEUKOCYTE ESTERASE UR QL STRIP: ABNORMAL
LYMPHOCYTES # BLD AUTO: 1.6 THOUSANDS/ΜL (ref 0.6–4.47)
LYMPHOCYTES NFR BLD AUTO: 35 % (ref 14–44)
MCH RBC QN AUTO: 26.8 PG (ref 26.8–34.3)
MCHC RBC AUTO-ENTMCNC: 31.9 G/DL (ref 31.4–37.4)
MCV RBC AUTO: 84 FL (ref 82–98)
MONOCYTES # BLD AUTO: 0.3 THOUSAND/ΜL (ref 0.17–1.22)
MONOCYTES NFR BLD AUTO: 7 % (ref 4–12)
NEUTROPHILS # BLD AUTO: 2.51 THOUSANDS/ΜL (ref 1.85–7.62)
NEUTS SEG NFR BLD AUTO: 55 % (ref 43–75)
NITRITE UR QL STRIP: NEGATIVE
NON-SQ EPI CELLS URNS QL MICRO: ABNORMAL /HPF
OTHER STN SPEC: ABNORMAL
PH UR STRIP.AUTO: 6 [PH] (ref 4.5–8)
PLATELET # BLD AUTO: 151 THOUSANDS/UL (ref 149–390)
PMV BLD AUTO: 11 FL (ref 8.9–12.7)
POTASSIUM SERPL-SCNC: 4.1 MMOL/L (ref 3.5–5.3)
PROT SERPL-MCNC: 6.8 G/DL (ref 6.4–8.2)
PROT UR STRIP-MCNC: ABNORMAL MG/DL
RBC # BLD AUTO: 3.54 MILLION/UL (ref 3.81–5.12)
RBC #/AREA URNS AUTO: ABNORMAL /HPF
SODIUM SERPL-SCNC: 139 MMOL/L (ref 136–145)
SP GR UR STRIP.AUTO: 1.02 (ref 1–1.03)
UROBILINOGEN UR QL STRIP.AUTO: 0.2 E.U./DL
WBC # BLD AUTO: 4.55 THOUSAND/UL (ref 4.31–10.16)
WBC #/AREA URNS AUTO: ABNORMAL /HPF

## 2017-04-17 PROCEDURE — 36415 COLL VENOUS BLD VENIPUNCTURE: CPT | Performed by: EMERGENCY MEDICINE

## 2017-04-17 PROCEDURE — 80053 COMPREHEN METABOLIC PANEL: CPT | Performed by: EMERGENCY MEDICINE

## 2017-04-17 PROCEDURE — 81001 URINALYSIS AUTO W/SCOPE: CPT | Performed by: EMERGENCY MEDICINE

## 2017-04-17 PROCEDURE — 99284 EMERGENCY DEPT VISIT MOD MDM: CPT

## 2017-04-17 PROCEDURE — 74177 CT ABD & PELVIS W/CONTRAST: CPT

## 2017-04-17 PROCEDURE — 85025 COMPLETE CBC W/AUTO DIFF WBC: CPT | Performed by: EMERGENCY MEDICINE

## 2017-04-17 PROCEDURE — 96374 THER/PROPH/DIAG INJ IV PUSH: CPT

## 2017-04-17 PROCEDURE — 96375 TX/PRO/DX INJ NEW DRUG ADDON: CPT

## 2017-04-17 PROCEDURE — 87086 URINE CULTURE/COLONY COUNT: CPT | Performed by: EMERGENCY MEDICINE

## 2017-04-17 PROCEDURE — 96361 HYDRATE IV INFUSION ADD-ON: CPT

## 2017-04-17 PROCEDURE — 74176 CT ABD & PELVIS W/O CONTRAST: CPT

## 2017-04-17 RX ORDER — IBUPROFEN 600 MG/1
600 TABLET ORAL EVERY 8 HOURS PRN
Qty: 15 TABLET | Refills: 0 | Status: SHIPPED | OUTPATIENT
Start: 2017-04-17 | End: 2017-04-25 | Stop reason: HOSPADM

## 2017-04-17 RX ORDER — CEPHALEXIN 500 MG/1
500 CAPSULE ORAL 4 TIMES DAILY
COMMUNITY
End: 2017-04-25 | Stop reason: HOSPADM

## 2017-04-17 RX ORDER — AZITHROMYCIN 250 MG/1
TABLET, FILM COATED ORAL
Qty: 6 TABLET | Refills: 0 | Status: SHIPPED | OUTPATIENT
Start: 2017-04-17 | End: 2017-04-25 | Stop reason: HOSPADM

## 2017-04-17 RX ORDER — ONDANSETRON 2 MG/ML
4 INJECTION INTRAMUSCULAR; INTRAVENOUS ONCE
Status: COMPLETED | OUTPATIENT
Start: 2017-04-17 | End: 2017-04-17

## 2017-04-17 RX ORDER — KETOROLAC TROMETHAMINE 30 MG/ML
15 INJECTION, SOLUTION INTRAMUSCULAR; INTRAVENOUS ONCE
Status: COMPLETED | OUTPATIENT
Start: 2017-04-17 | End: 2017-04-17

## 2017-04-17 RX ADMIN — IODIXANOL 80 ML: 320 INJECTION, SOLUTION INTRAVASCULAR at 12:50

## 2017-04-17 RX ADMIN — SODIUM CHLORIDE 1000 ML: 0.9 INJECTION, SOLUTION INTRAVENOUS at 11:16

## 2017-04-17 RX ADMIN — HYDROMORPHONE HYDROCHLORIDE 1 MG: 1 INJECTION, SOLUTION INTRAMUSCULAR; INTRAVENOUS; SUBCUTANEOUS at 11:23

## 2017-04-17 RX ADMIN — HEPARIN 300 UNITS: 100 SYRINGE at 14:55

## 2017-04-17 RX ADMIN — ONDANSETRON 4 MG: 2 INJECTION INTRAMUSCULAR; INTRAVENOUS at 11:21

## 2017-04-17 RX ADMIN — KETOROLAC TROMETHAMINE 15 MG: 30 INJECTION, SOLUTION INTRAMUSCULAR at 12:23

## 2017-04-18 LAB — BACTERIA UR CULT: NORMAL

## 2017-04-19 ENCOUNTER — TRANSCRIBE ORDERS (OUTPATIENT)
Dept: ADMINISTRATIVE | Facility: HOSPITAL | Age: 62
End: 2017-04-19

## 2017-04-19 ENCOUNTER — ALLSCRIPTS OFFICE VISIT (OUTPATIENT)
Dept: OTHER | Facility: OTHER | Age: 62
End: 2017-04-19

## 2017-04-19 ENCOUNTER — APPOINTMENT (OUTPATIENT)
Dept: LAB | Facility: HOSPITAL | Age: 62
End: 2017-04-19
Payer: COMMERCIAL

## 2017-04-19 DIAGNOSIS — N20.0 URIC ACID NEPHROLITHIASIS: Primary | ICD-10-CM

## 2017-04-19 DIAGNOSIS — N20.0 CALCULUS OF KIDNEY: ICD-10-CM

## 2017-04-19 LAB
BACTERIA UR QL AUTO: ABNORMAL /HPF
BILIRUB UR QL STRIP: NEGATIVE
CLARITY UR: ABNORMAL
CLARITY UR: NORMAL
COLOR UR: YELLOW
COLOR UR: YELLOW
GLUCOSE (HISTORICAL): NORMAL
GLUCOSE UR STRIP-MCNC: NEGATIVE MG/DL
HGB UR QL STRIP.AUTO: ABNORMAL
HGB UR QL STRIP.AUTO: NORMAL
KETONES UR STRIP-MCNC: NEGATIVE MG/DL
KETONES UR STRIP-MCNC: NORMAL MG/DL
LEUKOCYTE ESTERASE UR QL STRIP: ABNORMAL
LEUKOCYTE ESTERASE UR QL STRIP: NORMAL
NITRITE UR QL STRIP: NEGATIVE
NITRITE UR QL STRIP: NORMAL
NON-SQ EPI CELLS URNS QL MICRO: ABNORMAL /HPF
OTHER STN SPEC: ABNORMAL
PH UR STRIP.AUTO: 6 [PH]
PH UR STRIP.AUTO: 6 [PH] (ref 4.5–8)
PROT UR STRIP-MCNC: ABNORMAL MG/DL
PROT UR STRIP-MCNC: NORMAL MG/DL
RBC #/AREA URNS AUTO: ABNORMAL /HPF
SP GR UR STRIP.AUTO: 1.01
SP GR UR STRIP.AUTO: 1.01 (ref 1–1.03)
UROBILINOGEN UR QL STRIP.AUTO: 0.2 E.U./DL
WBC #/AREA URNS AUTO: ABNORMAL /HPF

## 2017-04-19 PROCEDURE — 87086 URINE CULTURE/COLONY COUNT: CPT

## 2017-04-19 PROCEDURE — 81001 URINALYSIS AUTO W/SCOPE: CPT

## 2017-04-20 LAB — BACTERIA UR CULT: NORMAL

## 2017-04-21 ENCOUNTER — ALLSCRIPTS OFFICE VISIT (OUTPATIENT)
Dept: OTHER | Facility: OTHER | Age: 62
End: 2017-04-21

## 2017-04-21 DIAGNOSIS — Q63.8: Primary | ICD-10-CM

## 2017-04-23 ENCOUNTER — APPOINTMENT (INPATIENT)
Dept: RADIOLOGY | Facility: HOSPITAL | Age: 62
DRG: 420 | End: 2017-04-23
Payer: COMMERCIAL

## 2017-04-23 ENCOUNTER — APPOINTMENT (EMERGENCY)
Dept: RADIOLOGY | Facility: HOSPITAL | Age: 62
DRG: 420 | End: 2017-04-23
Payer: COMMERCIAL

## 2017-04-23 ENCOUNTER — HOSPITAL ENCOUNTER (INPATIENT)
Facility: HOSPITAL | Age: 62
LOS: 2 days | Discharge: HOME | DRG: 420 | End: 2017-04-25
Attending: EMERGENCY MEDICINE | Admitting: FAMILY MEDICINE
Payer: COMMERCIAL

## 2017-04-23 DIAGNOSIS — E16.2 HYPOGLYCEMIA: ICD-10-CM

## 2017-04-23 DIAGNOSIS — N39.0 UTI (URINARY TRACT INFECTION): ICD-10-CM

## 2017-04-23 DIAGNOSIS — J18.9 PNEUMONIA: ICD-10-CM

## 2017-04-23 DIAGNOSIS — A41.9 SEPSIS, UNSPECIFIED: Primary | ICD-10-CM

## 2017-04-23 PROBLEM — T68.XXXA HYPOTHERMIA: Status: ACTIVE | Noted: 2017-04-23

## 2017-04-23 LAB
ALBUMIN SERPL BCP-MCNC: 2.2 G/DL (ref 3.5–5)
ALP SERPL-CCNC: 145 U/L (ref 46–116)
ALT SERPL W P-5'-P-CCNC: 17 U/L (ref 12–78)
ANION GAP SERPL CALCULATED.3IONS-SCNC: 11 MMOL/L (ref 4–13)
APTT PPP: 75 SECONDS (ref 24–36)
AST SERPL W P-5'-P-CCNC: 18 U/L (ref 5–45)
ATRIAL RATE: 69 BPM
BACTERIA UR QL AUTO: ABNORMAL /HPF
BASOPHILS # BLD AUTO: 0.01 THOUSANDS/ΜL (ref 0–0.1)
BASOPHILS NFR BLD AUTO: 0 % (ref 0–1)
BILIRUB SERPL-MCNC: 0.24 MG/DL (ref 0.2–1)
BILIRUB UR QL STRIP: NEGATIVE
BUN SERPL-MCNC: 14 MG/DL (ref 5–25)
CALCIUM SERPL-MCNC: 6.6 MG/DL (ref 8.3–10.1)
CHLORIDE SERPL-SCNC: 109 MMOL/L (ref 100–108)
CLARITY UR: CLEAR
CO2 SERPL-SCNC: 21 MMOL/L (ref 21–32)
COLOR UR: YELLOW
COLOR, POC: YELLOW
CREAT SERPL-MCNC: 0.81 MG/DL (ref 0.6–1.3)
EOSINOPHIL # BLD AUTO: 0.01 THOUSAND/ΜL (ref 0–0.61)
EOSINOPHIL NFR BLD AUTO: 0 % (ref 0–6)
ERYTHROCYTE [DISTWIDTH] IN BLOOD BY AUTOMATED COUNT: 17.4 % (ref 11.6–15.1)
GFR SERPL CREATININE-BSD FRML MDRD: >60 ML/MIN/1.73SQ M
GLUCOSE SERPL-MCNC: 100 MG/DL (ref 65–140)
GLUCOSE SERPL-MCNC: 121 MG/DL (ref 65–140)
GLUCOSE SERPL-MCNC: 140 MG/DL (ref 65–140)
GLUCOSE SERPL-MCNC: 145 MG/DL (ref 65–140)
GLUCOSE SERPL-MCNC: 146 MG/DL (ref 65–140)
GLUCOSE SERPL-MCNC: 160 MG/DL (ref 65–140)
GLUCOSE SERPL-MCNC: 172 MG/DL (ref 65–140)
GLUCOSE SERPL-MCNC: 190 MG/DL (ref 65–140)
GLUCOSE SERPL-MCNC: 209 MG/DL (ref 65–140)
GLUCOSE SERPL-MCNC: 21 MG/DL (ref 65–140)
GLUCOSE SERPL-MCNC: 262 MG/DL (ref 65–140)
GLUCOSE SERPL-MCNC: 274 MG/DL (ref 65–140)
GLUCOSE SERPL-MCNC: 295 MG/DL (ref 65–140)
GLUCOSE SERPL-MCNC: 302 MG/DL (ref 65–140)
GLUCOSE SERPL-MCNC: 401 MG/DL (ref 65–140)
GLUCOSE SERPL-MCNC: 488 MG/DL (ref 65–140)
GLUCOSE SERPL-MCNC: 58 MG/DL (ref 65–140)
GLUCOSE SERPL-MCNC: >500 MG/DL (ref 65–140)
GLUCOSE UR STRIP-MCNC: NEGATIVE MG/DL
HCT VFR BLD AUTO: 28 % (ref 34.8–46.1)
HGB BLD-MCNC: 9.4 G/DL (ref 11.5–15.4)
HGB UR QL STRIP.AUTO: ABNORMAL
INR PPP: 1.02 (ref 0.86–1.16)
KETONES UR STRIP-MCNC: NEGATIVE MG/DL
LACTATE SERPL-SCNC: 1.1 MMOL/L (ref 0.5–2)
LEUKOCYTE ESTERASE UR QL STRIP: ABNORMAL
LIPASE SERPL-CCNC: 32 U/L (ref 73–393)
LYMPHOCYTES # BLD AUTO: 0.95 THOUSANDS/ΜL (ref 0.6–4.47)
LYMPHOCYTES NFR BLD AUTO: 12 % (ref 14–44)
MCH RBC QN AUTO: 27.7 PG (ref 26.8–34.3)
MCHC RBC AUTO-ENTMCNC: 33.6 G/DL (ref 31.4–37.4)
MCV RBC AUTO: 83 FL (ref 82–98)
MONOCYTES # BLD AUTO: 0.39 THOUSAND/ΜL (ref 0.17–1.22)
MONOCYTES NFR BLD AUTO: 5 % (ref 4–12)
NEUTROPHILS # BLD AUTO: 6.26 THOUSANDS/ΜL (ref 1.85–7.62)
NEUTS SEG NFR BLD AUTO: 83 % (ref 43–75)
NITRITE UR QL STRIP: POSITIVE
NON-SQ EPI CELLS URNS QL MICRO: ABNORMAL /HPF
NRBC BLD AUTO-RTO: 0 /100 WBCS
OTHER STN SPEC: ABNORMAL
P AXIS: 78 DEGREES
PH UR STRIP.AUTO: 5.5 [PH] (ref 4.5–8)
PLATELET # BLD AUTO: 162 THOUSANDS/UL (ref 149–390)
PMV BLD AUTO: 10.4 FL (ref 8.9–12.7)
POTASSIUM SERPL-SCNC: 4.3 MMOL/L (ref 3.5–5.3)
PR INTERVAL: 184 MS
PROT SERPL-MCNC: 6.4 G/DL (ref 6.4–8.2)
PROT UR STRIP-MCNC: NEGATIVE MG/DL
PROTHROMBIN TIME: 13.5 SECONDS (ref 12–14.3)
QRS AXIS: 95 DEGREES
QRSD INTERVAL: 74 MS
QT INTERVAL: 404 MS
QTC INTERVAL: 432 MS
RBC # BLD AUTO: 3.39 MILLION/UL (ref 3.81–5.12)
RBC #/AREA URNS AUTO: ABNORMAL /HPF
SODIUM SERPL-SCNC: 141 MMOL/L (ref 136–145)
SP GR UR STRIP.AUTO: 1.01 (ref 1–1.03)
T WAVE AXIS: 83 DEGREES
UROBILINOGEN UR QL STRIP.AUTO: 0.2 E.U./DL
VENTRICULAR RATE: 69 BPM
WBC # BLD AUTO: 7.65 THOUSAND/UL (ref 4.31–10.16)
WBC #/AREA URNS AUTO: ABNORMAL /HPF

## 2017-04-23 PROCEDURE — 36415 COLL VENOUS BLD VENIPUNCTURE: CPT | Performed by: EMERGENCY MEDICINE

## 2017-04-23 PROCEDURE — 81002 URINALYSIS NONAUTO W/O SCOPE: CPT | Performed by: EMERGENCY MEDICINE

## 2017-04-23 PROCEDURE — 71020 HB CHEST X-RAY 2VW FRONTAL&LATL: CPT

## 2017-04-23 PROCEDURE — 87147 CULTURE TYPE IMMUNOLOGIC: CPT

## 2017-04-23 PROCEDURE — 87086 URINE CULTURE/COLONY COUNT: CPT

## 2017-04-23 PROCEDURE — 82948 REAGENT STRIP/BLOOD GLUCOSE: CPT

## 2017-04-23 PROCEDURE — 74176 CT ABD & PELVIS W/O CONTRAST: CPT

## 2017-04-23 PROCEDURE — 87186 SC STD MICRODIL/AGAR DIL: CPT

## 2017-04-23 PROCEDURE — 81001 URINALYSIS AUTO W/SCOPE: CPT

## 2017-04-23 PROCEDURE — 87077 CULTURE AEROBIC IDENTIFY: CPT

## 2017-04-23 PROCEDURE — 71260 CT THORAX DX C+: CPT

## 2017-04-23 PROCEDURE — 87040 BLOOD CULTURE FOR BACTERIA: CPT | Performed by: EMERGENCY MEDICINE

## 2017-04-23 PROCEDURE — 80307 DRUG TEST PRSMV CHEM ANLYZR: CPT

## 2017-04-23 PROCEDURE — 85730 THROMBOPLASTIN TIME PARTIAL: CPT | Performed by: EMERGENCY MEDICINE

## 2017-04-23 PROCEDURE — 83690 ASSAY OF LIPASE: CPT | Performed by: EMERGENCY MEDICINE

## 2017-04-23 PROCEDURE — 70450 CT HEAD/BRAIN W/O DYE: CPT

## 2017-04-23 PROCEDURE — 96374 THER/PROPH/DIAG INJ IV PUSH: CPT

## 2017-04-23 PROCEDURE — 93005 ELECTROCARDIOGRAM TRACING: CPT | Performed by: EMERGENCY MEDICINE

## 2017-04-23 PROCEDURE — 99285 EMERGENCY DEPT VISIT HI MDM: CPT

## 2017-04-23 PROCEDURE — 90471 IMMUNIZATION ADMIN: CPT

## 2017-04-23 PROCEDURE — 96361 HYDRATE IV INFUSION ADD-ON: CPT

## 2017-04-23 PROCEDURE — 85025 COMPLETE CBC W/AUTO DIFF WBC: CPT | Performed by: EMERGENCY MEDICINE

## 2017-04-23 PROCEDURE — 80053 COMPREHEN METABOLIC PANEL: CPT | Performed by: EMERGENCY MEDICINE

## 2017-04-23 PROCEDURE — 85610 PROTHROMBIN TIME: CPT | Performed by: EMERGENCY MEDICINE

## 2017-04-23 PROCEDURE — 90715 TDAP VACCINE 7 YRS/> IM: CPT | Performed by: EMERGENCY MEDICINE

## 2017-04-23 PROCEDURE — 94760 N-INVAS EAR/PLS OXIMETRY 1: CPT

## 2017-04-23 PROCEDURE — 83605 ASSAY OF LACTIC ACID: CPT | Performed by: EMERGENCY MEDICINE

## 2017-04-23 RX ORDER — DEXTROSE MONOHYDRATE 25 G/50ML
INJECTION, SOLUTION INTRAVENOUS
Status: COMPLETED
Start: 2017-04-23 | End: 2017-04-23

## 2017-04-23 RX ORDER — SODIUM CHLORIDE 9 MG/ML
100 INJECTION, SOLUTION INTRAVENOUS CONTINUOUS
Status: DISCONTINUED | OUTPATIENT
Start: 2017-04-23 | End: 2017-04-23

## 2017-04-23 RX ORDER — PANTOPRAZOLE SODIUM 20 MG/1
20 TABLET, DELAYED RELEASE ORAL
Status: DISCONTINUED | OUTPATIENT
Start: 2017-04-23 | End: 2017-04-25 | Stop reason: HOSPADM

## 2017-04-23 RX ORDER — DEXTROSE AND SODIUM CHLORIDE 5; .9 G/100ML; G/100ML
75 INJECTION, SOLUTION INTRAVENOUS CONTINUOUS
Status: DISCONTINUED | OUTPATIENT
Start: 2017-04-23 | End: 2017-04-23

## 2017-04-23 RX ORDER — GABAPENTIN 400 MG/1
800 CAPSULE ORAL 3 TIMES DAILY
Status: DISCONTINUED | OUTPATIENT
Start: 2017-04-23 | End: 2017-04-25 | Stop reason: HOSPADM

## 2017-04-23 RX ORDER — ACETAMINOPHEN 325 MG/1
650 TABLET ORAL EVERY 6 HOURS PRN
Status: DISCONTINUED | OUTPATIENT
Start: 2017-04-23 | End: 2017-04-25 | Stop reason: HOSPADM

## 2017-04-23 RX ORDER — DICYCLOMINE HYDROCHLORIDE 10 MG/1
10 CAPSULE ORAL
Status: DISCONTINUED | OUTPATIENT
Start: 2017-04-23 | End: 2017-04-25 | Stop reason: HOSPADM

## 2017-04-23 RX ORDER — FLUOXETINE HYDROCHLORIDE 20 MG/1
40 CAPSULE ORAL DAILY
Status: DISCONTINUED | OUTPATIENT
Start: 2017-04-23 | End: 2017-04-25 | Stop reason: HOSPADM

## 2017-04-23 RX ORDER — SODIUM CHLORIDE 9 MG/ML
100 INJECTION, SOLUTION INTRAVENOUS CONTINUOUS
Status: DISCONTINUED | OUTPATIENT
Start: 2017-04-23 | End: 2017-04-24

## 2017-04-23 RX ORDER — TAMSULOSIN HYDROCHLORIDE 0.4 MG/1
0.4 CAPSULE ORAL
Status: DISCONTINUED | OUTPATIENT
Start: 2017-04-23 | End: 2017-04-25 | Stop reason: HOSPADM

## 2017-04-23 RX ORDER — TRAMADOL HYDROCHLORIDE 50 MG/1
50 TABLET ORAL EVERY 6 HOURS PRN
Status: DISCONTINUED | OUTPATIENT
Start: 2017-04-23 | End: 2017-04-25 | Stop reason: HOSPADM

## 2017-04-23 RX ORDER — AMLODIPINE BESYLATE 10 MG/1
10 TABLET ORAL DAILY
Status: DISCONTINUED | OUTPATIENT
Start: 2017-04-23 | End: 2017-04-25 | Stop reason: HOSPADM

## 2017-04-23 RX ORDER — INSULIN GLARGINE 100 [IU]/ML
15 INJECTION, SOLUTION SUBCUTANEOUS
Status: DISCONTINUED | OUTPATIENT
Start: 2017-04-23 | End: 2017-04-25 | Stop reason: HOSPADM

## 2017-04-23 RX ORDER — HEPARIN SODIUM 5000 [USP'U]/ML
5000 INJECTION, SOLUTION INTRAVENOUS; SUBCUTANEOUS EVERY 8 HOURS SCHEDULED
Status: DISCONTINUED | OUTPATIENT
Start: 2017-04-23 | End: 2017-04-25 | Stop reason: HOSPADM

## 2017-04-23 RX ORDER — ONDANSETRON 2 MG/ML
4 INJECTION INTRAMUSCULAR; INTRAVENOUS EVERY 6 HOURS PRN
Status: DISCONTINUED | OUTPATIENT
Start: 2017-04-23 | End: 2017-04-25 | Stop reason: HOSPADM

## 2017-04-23 RX ORDER — SENNOSIDES 8.6 MG
1 TABLET ORAL DAILY
Status: DISCONTINUED | OUTPATIENT
Start: 2017-04-23 | End: 2017-04-25 | Stop reason: HOSPADM

## 2017-04-23 RX ORDER — ALBUTEROL SULFATE 90 UG/1
2 AEROSOL, METERED RESPIRATORY (INHALATION) EVERY 4 HOURS PRN
Status: DISCONTINUED | OUTPATIENT
Start: 2017-04-23 | End: 2017-04-25 | Stop reason: HOSPADM

## 2017-04-23 RX ORDER — PHENAZOPYRIDINE HYDROCHLORIDE 100 MG/1
100 TABLET, FILM COATED ORAL
Status: DISCONTINUED | OUTPATIENT
Start: 2017-04-23 | End: 2017-04-25 | Stop reason: HOSPADM

## 2017-04-23 RX ORDER — INSULIN GLARGINE 100 [IU]/ML
20 INJECTION, SOLUTION SUBCUTANEOUS
Status: DISCONTINUED | OUTPATIENT
Start: 2017-04-23 | End: 2017-04-23

## 2017-04-23 RX ORDER — MIRTAZAPINE 15 MG/1
7.5 TABLET, FILM COATED ORAL
Status: DISCONTINUED | OUTPATIENT
Start: 2017-04-23 | End: 2017-04-23

## 2017-04-23 RX ORDER — TRAZODONE HYDROCHLORIDE 50 MG/1
50 TABLET ORAL
Status: DISCONTINUED | OUTPATIENT
Start: 2017-04-23 | End: 2017-04-25 | Stop reason: HOSPADM

## 2017-04-23 RX ADMIN — DICYCLOMINE HYDROCHLORIDE 10 MG: 10 CAPSULE ORAL at 12:30

## 2017-04-23 RX ADMIN — DEXTROSE MONOHYDRATE 50 ML: 25 INJECTION, SOLUTION INTRAVENOUS at 10:18

## 2017-04-23 RX ADMIN — TETANUS TOXOID, REDUCED DIPHTHERIA TOXOID AND ACELLULAR PERTUSSIS VACCINE, ADSORBED 0.5 ML: 5; 2.5; 8; 8; 2.5 SUSPENSION INTRAMUSCULAR at 07:24

## 2017-04-23 RX ADMIN — TRAMADOL HYDROCHLORIDE 50 MG: 50 TABLET, COATED ORAL at 16:14

## 2017-04-23 RX ADMIN — TRAZODONE HYDROCHLORIDE 50 MG: 50 TABLET ORAL at 21:36

## 2017-04-23 RX ADMIN — PANTOPRAZOLE SODIUM 20 MG: 20 TABLET, DELAYED RELEASE ORAL at 12:30

## 2017-04-23 RX ADMIN — SODIUM CHLORIDE 1000 ML: 0.9 INJECTION, SOLUTION INTRAVENOUS at 06:00

## 2017-04-23 RX ADMIN — DICYCLOMINE HYDROCHLORIDE 10 MG: 10 CAPSULE ORAL at 16:03

## 2017-04-23 RX ADMIN — CEFEPIME 2000 MG: 2 INJECTION, POWDER, FOR SOLUTION INTRAMUSCULAR; INTRAVENOUS at 08:42

## 2017-04-23 RX ADMIN — GABAPENTIN 800 MG: 400 CAPSULE ORAL at 12:30

## 2017-04-23 RX ADMIN — AZITHROMYCIN MONOHYDRATE 250 MG: 500 INJECTION, POWDER, LYOPHILIZED, FOR SOLUTION INTRAVENOUS at 16:04

## 2017-04-23 RX ADMIN — FLUOXETINE 40 MG: 20 CAPSULE ORAL at 12:30

## 2017-04-23 RX ADMIN — GABAPENTIN 800 MG: 400 CAPSULE ORAL at 21:37

## 2017-04-23 RX ADMIN — HEPARIN SODIUM 5000 UNITS: 5000 INJECTION, SOLUTION INTRAVENOUS; SUBCUTANEOUS at 14:04

## 2017-04-23 RX ADMIN — TRAMADOL HYDROCHLORIDE 50 MG: 50 TABLET, COATED ORAL at 21:37

## 2017-04-23 RX ADMIN — IOHEXOL 80 ML: 350 INJECTION, SOLUTION INTRAVENOUS at 18:40

## 2017-04-23 RX ADMIN — VANCOMYCIN HYDROCHLORIDE 1250 MG: 1 INJECTION, POWDER, LYOPHILIZED, FOR SOLUTION INTRAVENOUS at 09:26

## 2017-04-23 RX ADMIN — INSULIN GLARGINE 15 UNITS: 100 INJECTION, SOLUTION SUBCUTANEOUS at 21:36

## 2017-04-23 RX ADMIN — PANCRELIPASE 6000 UNITS: 30000; 6000; 19000 CAPSULE, DELAYED RELEASE PELLETS ORAL at 16:03

## 2017-04-23 RX ADMIN — GABAPENTIN 800 MG: 400 CAPSULE ORAL at 16:03

## 2017-04-23 RX ADMIN — INSULIN LISPRO 3 UNITS: 100 INJECTION, SOLUTION INTRAVENOUS; SUBCUTANEOUS at 14:07

## 2017-04-23 RX ADMIN — SODIUM CHLORIDE 100 ML/HR: 0.9 INJECTION, SOLUTION INTRAVENOUS at 23:37

## 2017-04-23 RX ADMIN — PANCRELIPASE 6000 UNITS: 30000; 6000; 19000 CAPSULE, DELAYED RELEASE PELLETS ORAL at 12:30

## 2017-04-23 RX ADMIN — SODIUM CHLORIDE 100 ML/HR: 0.9 INJECTION, SOLUTION INTRAVENOUS at 12:22

## 2017-04-23 RX ADMIN — TAMSULOSIN HYDROCHLORIDE 0.4 MG: 0.4 CAPSULE ORAL at 16:03

## 2017-04-23 RX ADMIN — HEPARIN SODIUM 5000 UNITS: 5000 INJECTION, SOLUTION INTRAVENOUS; SUBCUTANEOUS at 21:36

## 2017-04-23 RX ADMIN — PHENAZOPYRIDINE HYDROCHLORIDE 100 MG: 100 TABLET ORAL at 14:04

## 2017-04-23 RX ADMIN — AMLODIPINE BESYLATE 10 MG: 10 TABLET ORAL at 12:30

## 2017-04-23 RX ADMIN — INSULIN LISPRO 10 UNITS: 100 INJECTION, SOLUTION INTRAVENOUS; SUBCUTANEOUS at 14:06

## 2017-04-23 RX ADMIN — DICYCLOMINE HYDROCHLORIDE 10 MG: 10 CAPSULE ORAL at 21:37

## 2017-04-23 NOTE — H&P
H&P Exam - Hong Foy 59 y o  female MRN: [id number]    Unit/Bed#: [facility] 610-01 Encounter: [id number]    Assessment:    Patient Active Problem List   Diagnosis    Hypertension    Diabetes mellitus    Kidney stones    Herniated lumbar intervertebral disc    COPD (chronic obstructive pulmonary disease)    CAP (community acquired pneumonia)    Acute respiratory failure with hypoxia    Anemia    Depression    IVDU (intravenous drug user)    NSTEMI (non-ST elevated myocardial infarction) II    Hepatitis C    Encephalopathy acute, likely metabolic    Hypernatremia    RLL pneumonia    UTI (urinary tract infection)     60 y/o F with a significant pmhx of 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 secondary to IV drugs use, diabetes type 2, hypertension, hyperlipidemia, depression with insomnia, tobacco use, GERD  Patient also with a history of recent intubation required secondary to a complex pneumonia in November 2016  Patient also had an end STEMI during her stay and isn't following up a cardiology  Plan:    1  Hypothermia: Multifactorial likely 2/2 UTI &RLL PNA & Hypoglycemia  -> Improving: Will continue to monitor with Vital q4 hrs checks  -> 93 F rectal on arrival, requiring light therapy then increased to 97 degrees  -> Per ED residents they follows Sirs pathway given only her hypoglycemia episode  Her blood work was WNL Lactic 1 1, WBC 7 65  1 dose of Vancomycin, and Cefepime given in the ED    -> Per my review patient only met 1 criteria therefore not making sirs   -> BC pending    2  CAP RLL:  -> Recent ICU admission in 11/16 Intubation required for complex PNA  ->Patient denies fevers, chills, coughs  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  States that she was started on azithromycin on Friday 2/2 Lung exam findings  No documentation found at PCP  She only had 1 dose of 500mg and was to continue 250 mg daily x4 days   -> Restart Azithryomycin 250mg IV daily, and Cefpimine 2g q24  -> Incentive spirometer at bedside    3  Complex UTI with Right Renal Calculus:  ->Patient asymptomatic given in the setting of taking pyridium TID given by her PCP on Friday  Unclear if Keflex was started previously  Recently complaining about RLL pain o/p and was to follow up with nephro  ->Start tomorrow Cefpimine 2g q24 hrs for treatment of complex UTI  1 dose given in ED today, Continue on Flomax 0 4mg HS    -> CT: (+) 1 5 cm right lower pole renal calculus/S/p 3/17 renal stent placement    -> Pain Control:  Continue Dysuria: Pyridum TID, Tramadol 50 mgs   -> UC pending    4  Hypoglycemia: unknown likely 2/2 the setting of multiple infections    -> A1c 9 0%  -> DM type 2 complicated with neuropathy  -> EMS: BS low 30s, unable to get IV accsess, patient was given oral glucagon  BS then raised to 53  Once patient arrive to ED BS were 120   -> Monitor BS check with meals  -> Start on SSI #3, Will restart Lantus 20 HS ( decrease from 30 units), Restart Humalog 10 units tomorrow with meals once patient tolerates PO  Held: Metformin patient elected to discontinue herself  Diabetic Neuropathy: Gabapentin 800 mg TID    5  HTN with history of CVA: Above baseline at /80s   -> Continue on Amlodipine  Patient states that she elected to stop taking Enalapril, and Lopressor on her own for a month    -> Will monitor BP, if elevated will consider restarting Enalapril  -> Hx of NSTEMI: During ICU stay on 11/16  [address] [facility] o/p    6  HLD: Stable on Atorvastatin 40 mg daily    7  Depression with insomina:   -> Recently started on Trazodone 50 mg bedtime to help aid for insomnia  Recently d/c Mirtazapine o/p  -> Depression controlled on FLuoxentine    8  Hx of drug abuse in the past  -> Currently living at [facility]  Been sober for >10 years    -> Avoid narcotics    9  Hep C: Being follow by PCP    10  Hx of pancreatic cancer s/p Whipple procedure: Patient has been in remission since 2011   She continues to have a port that was placed in [facility]  She states of port has not been removed given concern of relapse    11  COPD: Known history, no PRN meds at home  Will likely need to follow up with PCP  At this time controlled and no signs of COPD exacerbation  DVT ppx :heparin  Tobacco Use: Deferred Nicotine patch  Code Status: DNR/ DNI patient was very firm on this decision  Diet: Cardiac, IVF NS @100ml/hr    Dispo: patient will likely be admitted for greater than 2 midnight stay  If the patient continues to improve will discuss with team about possible late to start tomorrow  At this time will continue blood sugar checks every hour for 4 hours until blood sugars are appropriate  Continue to do vital signs every 4 hours for hypothermia  Please page [facility] if you have any questions      History of Present Illness   60 y/o F with a significant pmhx of 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 secondary to IV drugs use, diabetes type 2, hypertension, hyperlipidemia, depression with insomnia, tobacco use, GERD  Patient also with a history of recent intubation required secondary to a complex pneumonia in November 2016  Patient also had an end STEMI during her stay and isn't following up a cardiology  The patient presents today via EMS secondary to her supervisor finding her with an abrasion over her nose and on the floor  At this time we have an unknown mechanism of fall  They called the ambulance secondary to hypoglycemia finding her sugars being in the 30s  The patient only remembers from the ambulance ride to the hospital prior to this she has no recollection of what happened  She stated that this morning she woke up took her morning medications and felt fine, and then woke up in an ambulance  She denies any cough, shortness of breath, chest pain, fevers, GI  She does state that she continues to have right lower quadrant pain secondary to a kidney stone that is being monitored by urology  S/p stent in March 17   The patient is a very poor historian stating that she was started on azithromycin on Friday by her PCP/ or Urology started on for possible pneumonia  (no documentation as well as called her PCP who did not prescribe the patient a zpack) She was also started on trazodone and discontinued off the mirtazapine for her uncontrolled insomnia at that visit  The patient is currently on Keflex unknown reason she said that her urologist gave it to her 2/2 kidney stones  Review of Systems   Constitutional: Negative for activity change, appetite change, chills, fatigue and fever  HENT: Negative  Eyes: Negative  Respiratory: Negative for cough and shortness of breath  Cardiovascular: Negative for chest pain, palpitations and leg swelling  Gastrointestinal: Positive for abdominal pain  Negative for abdominal distention, constipation, diarrhea, nausea and vomiting  RLL pain   Endocrine: Negative  Genitourinary: Positive for dysuria  Musculoskeletal: Negative  Neurological: Negative  Hematological: Negative  Psychiatric/Behavioral: Negative  Hyperactive: b/l neuropathy pain  All other systems reviewed and are negative        Historical Information   Past Medical History:   Diagnosis Date    Cancer     pancreatic    Chronic low back pain     COPD (chronic obstructive pulmonary disease)     CVA (cerebral vascular accident) nov 2017    very minimal left leg residual, difficulty lifting leg    Depression     Diabetes mellitus     type II    DVT (deep venous thrombosis)     GERD (gastroesophageal reflux disease)     Herniated lumbar intervertebral disc     Hx of hepatitis C     Hypertension     Insomnia     Kidney stones     Myocardial infarction 11/2015    Nephrolithiasis     2015, 2016    NSTEMI (non-ST elevated myocardial infarction)     Pancreatic cancer 2011    Pneumonia 11/2015    CAP    Pulmonary nodule      Past Surgical History:   Procedure Laterality Date    APPENDECTOMY      CYST REMOVAL Right     ov[place], age 13    HYSTERECTOMY      KNEE SURGERY Right 2005    arthroscopy    PANCREATICODUODENECTOMY      MA CYSTO/URETERO W/LITHOTRIPSY &INDWELL STENT INSRT Right 3/1/2017    Procedure: CYSTOSCOPY; URETEROSCOPY; HOLMIUM LASER LITHOTRIPSY; BASKET STONE EXTRACTION; RETROGRADE PYELOGRAM; STENT INSERTION ;  Surgeon: Fernie Arzate MD;  Location: [facility];  Service: Urology    WHIPPLE PROCEDURE W/ LAPAROSCOPY       Social History   History   Alcohol Use No     History   Drug Use No     History   Smoking Status    Current Every Day Smoker    Packs/day: 0 25    Years: 50 00   Smokeless Tobacco    Not on file     Comment: pt refused     Family History: non-contributory    Meds/Allergies   PTA meds:   Prior to Admission Medications   Prescriptions Last Dose Informant Patient Reported? Taking? FLUoxetine HCl (PROZAC PO) Unknown at Unknown time  Yes No   Sig: Take 40 mg by mouth daily     Insulin Lispro (HUMALOG) 100 UNIT/ML SOCT 4/22/2017 at Unknown time  Yes Yes   Sig: Inject 10 Units under the skin 3 (three) times a day before meals   Sennosides (SENNA) 8 8 mg/5 mL oral syrup   No No   Sig: Take 5 mL by mouth daily at bedtime for 30 days   amLODIPine (NORVASC) 5 mg tablet 4/22/2017 at Unknown time  Yes Yes   Sig: Take 10 mg by mouth daily     ammonium lactate (LAC-HYDRIN) 12 % lotion 4/22/2017 at Unknown time  Yes Yes   Sig: Apply topically as needed for dry skin   aspirin 81 MG tablet 4/22/2017 at Unknown time  Yes Yes   Sig: Take 81 mg by mouth daily   azithromycin (ZITHROMAX) 250 mg tablet 4/22/2017 at Unknown time  No Yes   Sig: Take first 2 tablets together, then 1 every day until finished     cephalexin (KEFLEX) 500 mg capsule Unknown at Unknown time  Yes No   Sig: Take 500 mg by mouth 4 (four) times a day   dicyclomine (BENTYL) 10 mg capsule 4/22/2017 at Unknown time  Yes Yes   Sig: Take 10 mg by mouth 4 (four) times a day (before meals and at bedtime)   furosemide (LASIX) 20 mg tablet 4/22/2017 at Unknown time  Yes Yes   Sig: Take 20 mg by mouth 2 (two) times a day   gabapentin (NEURONTIN) 800 mg tablet 4/22/2017 at Unknown time  Yes Yes   Sig: Take 800 mg by mouth 3 (three) times a day   ibuprofen (MOTRIN) 600 mg tablet Unknown at Unknown time  No No   Sig: Take 1 tablet by mouth every 8 (eight) hours as needed for moderate pain (pain)   insulin glargine (LANTUS) 100 units/mL subcutaneous injection 4/22/2017 at Unknown time  Yes Yes   Sig: Inject 30 Units under the skin daily at bedtime   magnesium oxide (MAG-OX) 400 mg   No No   Sig: Take 1 tablet by mouth 2 (two) times a day for 30 days   metoprolol tartrate (LOPRESSOR) 25 mg tablet   No No   Sig: Take 1 tablet by mouth every 12 (twelve) hours for 30 days   mirtazapine (REMERON) 7 5 MG tablet 4/22/2017 at Unknown time  Yes Yes   Sig: Take 7 5 mg by mouth daily at bedtime   naproxen (NAPROSYN) 500 mg tablet Unknown at Unknown time  No No   Sig: Take 1 tablet by mouth 2 (two) times a day as needed for mild pain for up to 30 days   omeprazole (PriLOSEC) 20 mg delayed release capsule 4/22/2017 at Unknown time  Yes Yes   Sig: Take 20 mg by mouth daily   pancrelipase, Lip-Prot-Amyl, (CREON) 6,000 units delayed release capsule 4/22/2017 at Unknown time  Yes Yes   Sig: Take 6,000 units of lipase by mouth 3 (three) times a day with meals   pantoprazole (PROTONIX) 40 mg tablet   No No   Sig: Take 1 tablet by mouth daily in the early morning for 30 days   potassium chloride (K-DUR,KLOR-CON) 20 mEq tablet   No No   Sig: Take 1 tablet by mouth 2 (two) times a day for 30 days   tamsulosin (FLOMAX) 0 4 mg 4/22/2017 at Unknown time  No Yes   Sig: Take 1 capsule by mouth daily with dinner for 7 days      Facility-Administered Medications: None     Allergies   Allergen Reactions    Ciprofloxacin Rash       Objective   First Vitals:   Blood Pressure: 167/76 (04/23/17 0502)  Pulse: 74 (04/23/17 0502)  Temperature: (!) 93 °F (33 9 °C) (04/23/17 0502)  Temp Source: Oral (04/23/17 5097)  Respirations: 16 (04/23/17 0502)  Weight - Scale: 60 3 kg (133 lb) (04/23/17 0502)  SpO2: 100 % (04/23/17 0502)    Current Vitals:   Blood Pressure: 152/68 (04/23/17 1042)  Pulse: 88 (04/23/17 1042)  Temperature: (!) 97 4 °F (36 3 °C) (04/23/17 1042)  Temp Source: Oral (04/23/17 1042)  Respirations: 17 (04/23/17 1042)  Weight - Scale: 60 3 kg (133 lb) (04/23/17 0502)  SpO2: 98 % (04/23/17 1042)    No intake or output data in the 24 hours ending 04/23/17 1213    Invasive Devices     Central Venous Catheter Line            Port A Cath 04/23/17 Left Chest less than 1 day                Physical Exam   Constitutional: She is oriented to person, place, and time  She appears well-developed and well-nourished  HENT:   Head: Normocephalic  Right Ear: External ear normal    Left Ear: External ear normal    Nose: Nose normal    Mouth/Throat: Oropharynx is clear and moist    1-2 cm small abrasion over the patients nose      Eyes: Conjunctivae and EOM are normal  Pupils are equal, round, and reactive to light  Neck: Normal range of motion  Neck supple  Cardiovascular: Normal rate, regular rhythm, normal heart sounds and intact distal pulses  No murmur heard  Pulmonary/Chest: Effort normal and breath sounds normal    RLL crackles heard over the base  Port in place ( L)   Abdominal: Soft  There is tenderness (RLL pain)  Musculoskeletal: Normal range of motion  Neurological: She is alert and oriented to person, place, and time  She has normal reflexes  Mild weakness on the left extrem   Skin: Skin is warm and dry  Psychiatric: She has a normal mood and affect  Her behavior is normal  Judgment and thought content normal    Vitals reviewed  Lab Results: Reviewed  Imaging:   Ct abdomen:  1  Stable postoperative changes, following Whipple procedure  2  Hepatic steatosis  3  1 5 cm right lower pole renal calculus    4  No evidence of acute abnormality in the abdomen or pelvis  5  Right lower lobe pulmonary consolidation, most likely pneumonia      Ct: head WNL    EKG, Pathology, and Other Studies:     Code Status: Level 3 - DNAR and DNI

## 2017-04-23 NOTE — PROGRESS NOTES
Pt BS 58 on right hand and 140 on left hand  Pt is asymptomatic  Skyla Jones MD aware  Pt to be started on BS checks every hour x 12 hours

## 2017-04-23 NOTE — IP AVS SNAPSHOT
[address]  [address], [address]  [phone]                  After Visit Summary for:             Shasta Eisenmenger   61 yrs Female (1955)    MRN:  [id number]           About your hospitalization     You were admitted on:  April 23, 2017 You last received care in the:  [address] [place] [address]    You were discharged on:  April 25, 2017 Unit phone number:  [phone]         Medications     It is important that you maintain an up-to-date list of medications (prescribed and over-the-counter, as well as vitamins and mineral supplements) that you are taking  Bring your list of current medications whenever you seek treatment at a hospital or other healthcare setting  If you have any questions or concerns, contact your primary care physician's office             Your Medications      STOP taking these medications     amLODIPine 5 mg tablet   Commonly known as:  NORVASC           azithromycin 250 mg tablet   Commonly known as:  ZITHROMAX           cephalexin 500 mg capsule   Commonly known as:  KEFLEX           enalapril 10 mg tablet   Commonly known as:  VASOTEC           furosemide 20 mg tablet   Commonly known as:  LASIX           HUMALOG 100 UNIT/ML Soct   Generic drug:  Insulin Lispro           ibuprofen 600 mg tablet   Commonly known as:  MOTRIN           insulin glargine 100 units/mL subcutaneous injection   Commonly known as:  LANTUS           metoprolol tartrate 25 mg tablet   Commonly known as:  LOPRESSOR           naproxen 500 mg tablet   Commonly known as:  NAPROSYN           potassium chloride 20 mEq tablet   Commonly known as:  K-DUR,KLOR-CON             TAKE these medications     albuterol 90 mcg/act inhaler   Inhale 2 puffs every 4 (four) hours as needed for wheezing for up to 30 days   Refills:  0   Commonly known as:  PROVENTIL HFA,VENTOLIN HFA           ammonium lactate 12 % lotion   Apply topically as needed for dry skin   Refills:  0   Commonly known as:  LAC-HYDRIN           aspirin 81 MG tablet   Take 81 mg by mouth daily   Refills:  0           budesonide-formoterol 160-4 5 mcg/act inhaler   Inhale 2 puffs 2 (two) times a day for 30 days   Last time this was given:  2 puffs on 4/25/2017  9:12 AM   Refills:  0   Commonly known as:  SYMBICORT           CREON 6,000 units delayed release capsule   Take 6,000 units of lipase by mouth 3 (three) times a day with meals   Last time this was given:  6,000 Units on 4/25/2017 12:20 PM   Refills:  0   Generic drug:  pancrelipase (Lip-Prot-Amyl)           dicyclomine 10 mg capsule   Take 10 mg by mouth 4 (four) times a day (before meals and at bedtime)   Last time this was given:  10 mg on 4/25/2017 12:20 PM   Refills:  0   Commonly known as:  BENTYL           gabapentin 800 mg tablet   Take 800 mg by mouth 3 (three) times a day   Refills:  0   Commonly known as:  NEURONTIN           lisinopril 5 mg tablet   Take 1 tablet by mouth daily for 30 days   Refills:  0   Commonly known as:  ZESTRIL           magnesium oxide 400 mg   Take 1 tablet by mouth 2 (two) times a day for 30 days   Refills:  0   Commonly known as:  MAG-OX           metFORMIN 1000 MG tablet   Take 1,000 mg by mouth 2 (two) times a day with meals   Refills:  0   Commonly known as:  GLUCOPHAGE           mirtazapine 30 mg tablet   Take 1 tablet by mouth daily at bedtime for 30 days   Refills:  0   Commonly known as:  REMERON           omeprazole 20 mg delayed release capsule   Take 20 mg by mouth daily   Refills:  0   Commonly known as:  PriLOSEC           pantoprazole 40 mg tablet   Take 1 tablet by mouth daily in the early morning for 30 days   Last time this was given:  20 mg on 4/25/2017  6:25 AM   Refills:  0   Commonly known as:  PROTONIX           PROZAC PO   Take 40 mg by mouth daily   Last time this was given:  40 mg on 4/25/2017  9:12 AM   Refills:  0           senna 8 8 mg/5 mL oral syrup   Take 5 mL by mouth daily at bedtime for 30 days   Refills:  0           tamsulosin 0 4 mg   Take 1 capsule by mouth daily with dinner for 7 days   Last time this was given:  0 4 mg on 4/24/2017  6:56 PM   Refills:  0   Commonly known as:  FLOMAX           traMADol 50 mg tablet   Take 50 mg by mouth daily as needed for moderate pain   Last time this was given:  50 mg on 4/24/2017  6:55 PM   Refills:  0   Commonly known as:  ULTRAM           traZODone 50 mg tablet   Take 50 mg by mouth daily at bedtime   Last time this was given:  50 mg on 4/24/2017 10:19 PM   Refills:  0   Commonly known as:  DESYREL                Where to Get Your Medications      [facility] [address] [place] [address]     Phone:  [phone]     albuterol 90 mcg/act inhaler    budesonide-formoterol 160-4 5 mcg/act inhaler    lisinopril 5 mg tablet    mirtazapine 30 mg tablet                  Your Medications      Your Medication List           Morning Noon Evening Bedtime As Needed    albuterol 90 mcg/act inhaler   Inhale 2 puffs every 4 (four) hours as needed for wheezing for up to 30 days   Commonly known as:  PROVENTIL HFA,VENTOLIN HFA                                   ammonium lactate 12 % lotion   Apply topically as needed for dry skin   Commonly known as:  LAC-HYDRIN                                aspirin 81 MG tablet   Take 81 mg by mouth daily                                   budesonide-formoterol 160-4 5 mcg/act inhaler   Inhale 2 puffs 2 (two) times a day for 30 days   Last time this was given:  2 puffs on 4/25/2017  9:12 AM   Commonly known as:  SYMBICORT                                      CREON 6,000 units delayed release capsule   Take 6,000 units of lipase by mouth 3 (three) times a day with meals   Last time this was given:  6,000 Units on 4/25/2017 12:20 PM   Generic drug:  pancrelipase (Lip-Prot-Amyl)                                         dicyclomine 10 mg capsule   Take 10 mg by mouth 4 (four) times a day (before meals and at bedtime)   Last time this was given:  10 mg on 4/25/2017 12:20 PM   Commonly known as:  BENTYL                                            gabapentin 800 mg tablet   Take 800 mg by mouth 3 (three) times a day   Commonly known as:  NEURONTIN                                      lisinopril 5 mg tablet   Take 1 tablet by mouth daily for 30 days   Commonly known as:  ZESTRIL                                   magnesium oxide 400 mg   Take 1 tablet by mouth 2 (two) times a day for 30 days   Commonly known as:  MAG-OX                                      metFORMIN 1000 MG tablet   Take 1,000 mg by mouth 2 (two) times a day with meals   Commonly known as:  GLUCOPHAGE                                      mirtazapine 30 mg tablet   Take 1 tablet by mouth daily at bedtime for 30 days   Commonly known as:  REMERON                                   omeprazole 20 mg delayed release capsule   Take 20 mg by mouth daily   Commonly known as:  PriLOSEC                                   pantoprazole 40 mg tablet   Take 1 tablet by mouth daily in the early morning for 30 days   Last time this was given:  20 mg on 4/25/2017  6:25 AM   Commonly known as:  PROTONIX                                   PROZAC PO   Take 40 mg by mouth daily   Last time this was given:  40 mg on 4/25/2017  9:12 AM                                   senna 8 8 mg/5 mL oral syrup   Take 5 mL by mouth daily at bedtime for 30 days                                   tamsulosin 0 4 mg   Take 1 capsule by mouth daily with dinner for 7 days   Last time this was given:  0 4 mg on 4/24/2017  6:56 PM   Commonly known as:  FLOMAX                                   traMADol 50 mg tablet   Take 50 mg by mouth daily as needed for moderate pain   Last time this was given:  50 mg on 4/24/2017  6:55 PM   Commonly known as:  ULTRAM                                   traZODone 50 mg tablet   Take 50 mg by mouth daily at bedtime   Last time this was given:  50 mg on 4/24/2017 10:19 PM   Commonly known as:  DESYREL                                               Follow-ups for After Discharge        Follow-up Information     Follow up with Samara Greer MD      Specialties:  [address], Pulmonology, Pulmonary Disease    Why:  Appointment is Monday June 5th at 11:30AM in our [place] office  Breathing test and CT will need to be done prior to this appointment  Our office will mail you the information  Contact information:    [address]  [phone]          Follow up with Jennifer Nelson MD  Schedule an appointment as soon as possible for a visit in 1 week(s)  Specialties:  Family Medicine, Obstetrics and Gynecology    Why:  Please follow up for [place] and [facility]    Contact information:    [address]  [phone]          Follow up with Davida Sifuentes PA-C  Schedule an appointment as soon as possible for a visit in 1 week(s)      Specialty:  Urology    Why:  follow up     Contact information:    [address]        Pending Labs     Order Current Status    Platelet count Collected (04/24/17 0536)    Blood culture Preliminary result    Blood culture Preliminary result      Immunizations     Name Date      Tdap 04/23/17       Your Allergies  Date Reviewed: 4/23/2017    Allergen Reactions    Ciprofloxacin Rash      POLST/Adv Directive          Most Recent Value    POLST  Patient does not have POLST - would not like information    Advance Directive  Patient does not have advance directive - would not like information          Instructions for After Discharge        Activity Instructions     Activity as tolerated                 Diet Instructions     Discharge Diet       Discharge Diet:  Diabetic               Discharge Instructions                   Hypoglycemia in a Person with Diabetes   AMBULATORY CARE: Hypoglycemia  is a serious condition that happens when your blood glucose (sugar) level drops too low  The blood sugar level is usually too high in a person with diabetes, but the level can also drop too low  It is important to follow your diabetes management plan to keep your blood sugar level steady  Common signs and symptoms include the following:   · Headache, hunger, or nervousness     · Trouble thinking or moodiness     · Sweating, or a pounding heartbeat     · Forgetfulness, confusion, or double vision     · Weakness or trouble walking     · Numbness and tingling in your fingers or around your mouth     · Seizures or loss of consciousness  Call 911 for any of the following:   · You have a seizure or pass out  · You feel you are going to pass out  · You have trouble thinking clearly  Seek care immediately if:  · Your blood sugar is less than 50 mg/dL and does not respond to treatment  Contact your healthcare provider if:   · You have had symptoms of low blood sugar several times  · You have questions about the amount of insulin or diabetes medicine you are taking  · You have questions or concerns about your condition or care  Manage hypoglycemia:   · Check your blood sugar level right away if you have symptoms of hypoglycemia  Hypoglycemia is usually 70 mg/dL or below  Ask your healthcare provider what blood sugar level is too low for you  · If your blood sugar level is too low, eat or drink 15 grams of fast-acting carbohydrate  Examples of this amount of fast-acting carbohydrate are 4 ounces (½ cup) of fruit juice or 4 ounces of regular soda  Other examples are 2 tablespoons of raisins or 3 to 4 glucose tablets  Check your blood sugar level 15 minutes later  If the level is still low (less than 100 mg/dL), have another 15 grams of carbohydrate  When the level returns to 100 mg/dL, eat a snack or meal that contains carbohydrate and protein   This will help prevent another drop in blood sugar  Always carefully follow your healthcare provider's instructions on how to treat low blood sugar levels  · Always carry a source of fast-acting carbohydrate  If you have symptoms of hypoglycemia and you do not have a blood glucose meter, have a source of fast-acting carbohydrate anyway  Avoid carbohydrate foods that are high in fat  The fat content may make it take longer to increase your blood sugar level  Ask your healthcare provider if you should carry a glucagon kit  Glucagon is a medicine that is injected when you develop severe hypoglycemia and become unconscious  Check the expiration date every month and replace it before it expires  · Teach others how to help you if you have symptoms of hypoglycemia  Tell them about the symptoms of hypoglycemia  Ask them to give you a source of fast-acting carbohydrate if you cannot get it yourself  Ask them to give you a glucagon injection if you have symptoms of hypoglycemia and you become unconscious or have a seizure  Ask them to call 911   This is an emergency  Tell them never to try to make you swallow anything if you faint or have a seizure  · Wear medical alert jewelry  or carry a card that says you have diabetes  Ask where to get these items  Prevent hypoglycemia:   · Take diabetes medicine as directed  Take your medicine at the right time and in the right amount  Your healthcare provider may change your blood sugar goals if you get hypoglycemia often  · Eat regular meals and snacks  Talk to your dietitian or healthcare provider about a meal plan that is right for you  Do not skip meals  · Check your blood sugar level as directed  Ask your healthcare provider what your blood sugar levels should be before and after you eat  Ask when and how often to check your blood sugar level  You may need to check at least 3 times each day   Record your blood sugar level results and take the record with you when you see your healthcare provider  Your provider may use the record to make changes to your medicine, food, or exercise schedules  · Check your blood sugar level before you exercise  Exercise can decrease your blood sugar level  If your blood sugar level is less than 100 mg/dL, have a carbohydrate snack  Examples are 4 to 6 crackers, ½ banana, 8 ounces (1 cup) of nonfat or 1% milk, or 4 ounces (½ cup) of juice  If you will exercise for more than 1 hour, you may need to check your blood sugar level every 30 minutes  Your healthcare provider may also recommend that you check your blood sugar level after exercise  · Be aware of how alcohol affects your blood sugar level  Alcohol can cause your blood sugar level to drop for up to 12 hours after drinking  Ask your healthcare provider if alcohol is safe for you  If you drink alcohol, always have a snack or meal at the same time  Women should limit alcohol to 1 drink a day  Men should limit alcohol to 2 drinks a day  A drink of alcohol is 12 ounces of beer, 5 ounces of wine, or 1½ ounces of liquor  Follow up with your healthcare provider as directed: You may need dose changes to your insulin or oral diabetes medicine if you have hypoglycemia  Write down your questions so you remember to ask them during your visits  © 2017 [address] is for End User's use only and may not be sold, redistributed or otherwise used for commercial purposes  All illustrations and images included in CareNotes® are the copyrighted property of [employer]  or [employer]  The above information is an  only  It is not intended as medical advice for individual conditions or treatments  Talk to your doctor, nurse or pharmacist before following any medical regimen to see if it is safe and effective for you        COPD (Chronic Obstructive Pulmonary Disease)   WHAT YOU NEED TO KNOW:   Chronic obstructive pulmonary disease (COPD) is a lung disease that makes it hard for you to breathe  It is usually a result of lung damage caused by years of irritation and inflammation in your lungs  COPD is a serious condition that gets worse over time  There is no cure, but there are things you can do to feel better and prevent exacerbations  A COPD exacerbation is when your symptoms suddenly get worse  It is important to prevent exacerbations because they cause more lung damage  DISCHARGE INSTRUCTIONS:   Manage COPD and help prevent exacerbations:   · Do not smoke and avoid others who smoke  If you smoke, it is never too late to quit  You are likely to live longer and breathe easier if you quit smoking  You may also have fewer COPD exacerbations  Ask for information about medicines and support programs that can help you quit  · Be aware of and avoid things that make your symptoms worse  Cold weather and sudden temperature changes can trigger an exacerbation  Fumes from cars and chemicals, air pollution, and perfume can also increase your symptoms  · Exercise daily  Exercising for at least 20 minutes per day can help increase your energy and decrease shortness of breath  Walking or riding a bike are good ways to exercise  Ask about the best exercise plan for you  · Prevent infections that can be dangerous when you have COPD  Get a flu vaccine every year as soon as it becomes available  Ask if you should also get vaccines to prevent pneumonia, whooping cough, tetanus, and diphtheria  Avoid people who are sick, and wash your hands often  Use pursed-lip breathing any time you feel short of breath: Take a deep breath in through your nose  Slowly breathe out through your mouth with your lips pursed for twice as long as you inhaled  You can also practice this breathing pattern while you bend, lift, climb stairs, or exercise  It slows down your breathing and helps move more air in and out of your lungs         Medicines:   · Medicines  to open your airways, decrease swelling and inflammation in your lungs, or treat an infection may be given  You may need 2 or more medicines  A short-acting medicine relieves symptoms quickly  Long-acting medicines will control or prevent symptoms  Ask for more information about the medicines you are given and how to use them safely  · Take your medicine as directed  Contact your healthcare provider if you think your medicine is not helping or if you have side effects  Tell him if you are allergic to any medicine  Keep a list of the medicines, vitamins, and herbs you take  Include the amounts, and when and why you take them  Bring the list or the pill bottles to follow-up visits  Carry your medicine list with you in case of an emergency  Follow up with your healthcare provider as directed: You may need more tests  Your healthcare provider may refer you to a pulmonary (lung) specialist  Write down your questions so you remember to ask them during your visits  Pulmonary rehabilitation:  Your healthcare provider may recommend a program to help you manage your symptoms and improve your quality of life  It may include nutritional counseling and exercise, such as walking, to strengthen your lungs  Make decisions about your choices for future treatment:  Ask for information about advanced medical directives and living rojas  These documents help you decide and write down your choices for treatment and end-of-life care  It is best to complete them when you feel well and can think clearly about your wishes  The information can then be kept for future use if you are in the hospital or become very ill  Seek care immediately if:   · You are confused, dizzy, or feel faint  · Your arm or leg feels warm, tender, and painful  It may look swollen and red  · You feel lightheaded, short of breath, and have chest pain  · You cough up blood    Contact your healthcare provider if:   · You have more shortness of breath than usual  · You need more medicine than usual to control your symptoms  · You are coughing or wheezing more than usual      · You are coughing up more mucus, or it is a different color or has a different odor  · You gain more than 3 pounds in a week  · You have a fever, a runny or stuffy nose, and a sore throat, or other cold or flu symptoms  · Your skin, lips, or nails start to turn blue  · You have swelling in your legs or ankles  · You are very tired or weak for more than a day  · You notice changes in your mood, or changes in your ability to think or concentrate  · You have questions or concerns about your condition or care  © 2017 [address] is for End User's use only and may not be sold, redistributed or otherwise used for commercial purposes  All illustrations and images included in CareNotes® are the copyrighted property of [employer]  or [employer] Miles  The above information is an  only  It is not intended as medical advice for individual conditions or treatments  Talk to your doctor, nurse or pharmacist before following any medical regimen to see if it is safe and effective for you  Kidney Stones   WHAT YOU NEED TO KNOW:   Kidney stones form in the urinary system when the water and waste in your urine are out of balance  When this happens, certain types of waste crystals separate from the urine  The crystals build up and form kidney stones  You may have 1 or more kidney stones  DISCHARGE INSTRUCTIONS:   Seek care immediately:   · You have vomiting that is not relieved by medicine  Contact your healthcare provider if:   · You have a fever  · You have trouble passing urine  · You see blood in your urine  · You have severe pain  · You have any questions or concerns about your condition or care  Medicines:   · NSAIDs , such as ibuprofen, help decrease swelling, pain, and fever   This medicine is available with or without a doctor's order  NSAIDs can cause stomach bleeding or kidney problems in certain people  If you take blood thinner medicine, always ask your healthcare provider if NSAIDs are safe for you  Always read the medicine label and follow directions  · Prescription medicine  may be given  Ask how to take this medicine safely  · Medicines  to balance your electrolytes may be needed  · Take your medicine as directed  Contact your healthcare provider if you think your medicine is not helping or if you have side effects  Tell him or her if you are allergic to any medicine  Keep a list of the medicines, vitamins, and herbs you take  Include the amounts, and when and why you take them  Bring the list or the pill bottles to follow-up visits  Carry your medicine list with you in case of an emergency  Follow up with your healthcare provider as directed: You may need to return for more tests  Write down your questions so you remember to ask them during your visits  Self-care:   · Drink plenty of liquids  Your healthcare provider may tell you to drink at least 8 to 12 (eight-ounce) cups of liquids each day  This helps flush out the kidney stones when you urinate  Water is the best liquid to drink  · Strain your urine every time you go to the bathroom  Urinate through a strainer or a piece of thin cloth to catch the stones  Take the stones to your healthcare provider so they can be sent to the lab for tests  This will help your healthcare providers plan the best treatment for you  · Eat a variety of healthy foods  Healthy foods include fruits, vegetables, whole-grain breads, low-fat dairy products, beans, and fish  You may need to limit how much sodium (salt) or protein you eat  Ask for information about the best foods for you  · Stay active  Your stones may pass more easily by if you stay active  Ask about the best activities for you    After you pass your kidney stones: Once you have passed your kidney stones, your healthcare provider may  order a 24-hour urine test  Results from a 24-hour urine test will help your healthcare provider plan ways to prevent more stones from forming  If you are told to do a 24-hour test, your healthcare provider will give you more instructions  © 2017 [address] is for End User's use only and may not be sold, redistributed or otherwise used for commercial purposes  All illustrations and images included in CareNotes® are the copyrighted property of [employer]  or [employer] Miles  The above information is an  only  It is not intended as medical advice for individual conditions or treatments  Talk to your doctor, nurse or pharmacist before following any medical regimen to see if it is safe and effective for you            Other Instructions     Call provider for:  persistent dizziness or light-headedness                    Summary of Your Hospitalization        Reason for Hospitalization     Your primary diagnosis was:  Hypoglycemia    Your diagnoses also included:  Rll Pneumonia, Kidney Stones, Anemia, Copd (Chronic Obstructive Pulmonary Disease), Hypertension, Diabetes Mellitus, Ivdu (Intravenous Drug User), Hepatitis C, Depression, Herniated Lumbar Intervertebral Disc, Hypothermia, Consolidation Lung      Chief Complaint     Hypoglycemia - Symptomatic      Attending/Consulting Providers     Provider Service Role Specialty Primary office phone    Elidia Tello MD General Medicine Attending Provider Family Medicine [phone]    Luis Ansari MD Pulmonology Consulting Physician  Critical Care Medicine [phone]         Last Resulted Labs     Date/Time Component Value Reference Range Units Lab Status    04/25/17 0610 WBC 3 37 4 31 - 10 16 Thousand/uL Thousand/uL Final result    04/25/17 0610 HGB 8 3 11 5 - 15 4 g/dL g/dL Final result    04/25/17 0610 HCT 25 4 34 8 - 46 1 % % Final result 04/25/17 0610  149 - 390 Thousands/uL Thousands/uL Final result    11/20/16 0429 BANDSPCT 1 0 - 8 % % Final result    04/25/17 0610  136 - 145 mmol/L mmol/L Final result    04/25/17 0610 K 4 2 3 5 - 5 3 mmol/L mmol/L Final result    04/25/17 0610  100 - 108 mmol/L mmol/L Final result    04/25/17 0610 CO2 25 21 - 32 mmol/L mmol/L Final result    04/25/17 0610 BUN 9 5 - 25 mg/dL mg/dL Final result    04/25/17 0610 CREATININE 0 69 0 60 - 1 30 mg/dL mg/dL Final result    04/25/17 0610 GLUCOSE 35 65 - 140 mg/dL mg/dL Final result    04/23/17 0650 ALKPHOS 145 46 - 116 U/L U/L Final result    04/23/17 0650 ALT 17 12 - 78 U/L U/L Final result    04/23/17 0650 AST 18 5 - 45 U/L U/L Final result    04/23/17 0650 ALBUMIN 2 2 3 5 - 5 0 g/dL g/dL Final result    04/23/17 0650 BILITOT 0 24 0 20 - 1 00 mg/dL mg/dL Final result    04/23/17 0650 LIPASE 32 73 - 393 u/L u/L Final result    11/18/16 0448 CHOL 133 50 - 200 mg/dL mg/dL Final result    11/18/16 0448 HDL 25 40 - 60 mg/dL mg/dL Final result    11/18/16 0448 LDLCALC 80 0 - 100 mg/dL mg/dL Final result    11/18/16 0448 TRIG 138 <=150 mg/dL mg/dL Final result    04/25/17 0610 HGBA1C 8 8 4 2 - 6 3 % % Final result    11/18/16 2221 TROPONINI 0 03 <0 04 ng/mL ng/mL Final result    04/23/17 0650 INR 1 02 0 86 - 1 16 - Final result      SysClass     To access this document and other health information online, please visit www  [facility] to login or create a patient portal account  For questions about any pending test results, you may contact the ordering physician or your primary care provider  Discharge Weight          Most Recent Value    Weight  64 8 kg (142 lb 13 7 oz) [Bed] filed at 04/23/2017 1232      Information on COPD     You have Chronic Obstructive Lung Disease   Please review the following to reduce your risk of suffering from an exacerbation (flare up) of COPD, which may contribute to a quicker decline in your lung function  Discharge Medications: Follow the instructions given to you regarding discharge medications  Discontinuing steroids or other medications before recommended may lead to a re-hospitalization  Maintenance inhalers (long term) will help reduce exacerbations  Take these every day, regardless of your respiratory symptoms  Rescue inhalers (quick relief) should be used when needed for breathing difficulty, but no more than prescribed  Risk factors for  COPD exacerbations:    Tobacco cessation: Discontinuing use of ALL tobacco products and other inhaled substances is the most important aspect of reducing your risk for COPD exacerbations and other serious pulmonary & cardiovascular illnesses  If you are a tobacco user: STOP  Talk with your physician or other healthcare provider about cessation aides to help you quit  Counseling is available through the [address] [place] [address] ([phone]) or the [facility] hotline (9-911-SWVJ-GCB) and is free of charge  Infection:  Receive the influenza vaccination every year  Discuss with your doctor the best time to receive the pneumococcal (pneumonia) vaccination  Avoid large crowds, especially during flu season  Wash your hands as often as possible to prevent the spread of bacteria which is more likely to cause a serious respiratory infection in patients with COPD  Inhaled irritants: Avoid strong fumes that come from cleaning products, cooking and perfumes  These may trigger tightness in the airways, causing respiratory distress  * Recovering from an exacerbation of COPD takes time--healing continues at home after discharge  Do not rush yourself or plan return to work or other daily activities too quickly, or you may suffer a relapse  Conserve energy and plan tasks so they are spaced out throughout the day  COPD Zone Tool:    Stay in the GREEN zone, where you are able to maintain normal activity      TAKE ACTION in the YELLOW zone (call your doctor or COPD coordinator)  Yellow Zone symptoms may include: Increasing shortness of breath with or without activity, increase in cough or sputum production OR change in sputum appearance (creamy, green, thicker), fever/chills   If you fall into the RED zone (symptoms unchanged with rescue inhalers, confusion or increasing drowsiness, dusky colored nail beds, inability to sleep because of breathing) call your medical doctor to be seen that day or call 911 if symptoms are too severe  Information on Diabetes     You have been diagnosed with - or have a history of  diabetes during your hospitalization  Review the following to help you manage your diabetes  Discharge Medications:  Taking your medications as prescribed is one of the most important aspects of maintaining your blood sugar in the target range established by your physician  It is important to know the names of your medication, how they work, how much to take, and when to take them  Do not stop your prescribed medications or begin taking over-the counter or herbal medications without first speaking with your physician  Hypoglycemia (Low Blood Sugar)  Too little glucose (sugar) in your blood is called hypoglycemia or low blood sugar  Diabetes itself does not cause low blood sugar  But some of the treatments for diabetes, such as pills or insulin, may increase your risk for it  In severe cases, low blood sugar may cause you to lose consciousness or have a seizure  · Low blood sugar is typically defined as a level below 70 mg/dL, (below 60 mg/dL if pregnant)   Signs of low blood sugar (you may have one or more of these symptoms): shakiness or dizziness; cold/clammy skin or sweating; hunger;  headache; nervousness; hard/heavy heartbeat; weakness; confusion/irritability; blurred vision   Always treat low blood sugar right away, but do not overeat     What you should do if blood sugar too low:    First, check your blood sugar  If it is not possible to check your blood sugar, treat for low blood sugar anyway  ? If blood sugar is below 70 mg/dL (60 mg/dL if pregnant) OR  If unable to check blood sugar level and you have signs of low blood sugar,  eat or drink 15 to 20 grams of fast-acting sugar  This may be 3-4 glucose tablets or 4 oz  (half a cup) fruit juice or 4 oz  (half a cup) regular (non-diet) soda or 8 oz  (one cup) fat free milk, or 1 tablespoon of honey  Do not take more than this, or your blood sugar may go too high  ? Wait 15 minutes  Then recheck your blood sugar if you can   ? If your blood sugar is still too low, repeat the steps above and check your blood sugar again  If your blood sugar still has not returned to your target range, contact your health care provider or seek emergency care  ? Once your blood sugar returns to target range, eat a snack or meal    Preventing low blood sugar  ? Follow diabetic diet  Do not skip meals or snacks  ? Take your medications at the prescribed times  ? Always carry a source of fast-acting sugar and a snack when you are away from home  ? Seek further medical help if you have repeated  low or high blood sugars    Keep/call your physician for follow-up appointments for the diabetes  Diabetes Education Classes: You are encouraged to learn how to best manage your diabetes by attending classes at one of the following locations:  [facility] ([phone])  [address] [place] [address] ([phone])  [address] [place] [address] ([phone])          Educational Information     Venous Thromboembolism (VTE) / Deep Vein Thrombosis (DVT) Prevention   VTE/DVT is a disease caused by blood clots that form in veins  Blood clots can be serious and common in patients with risk factors  VTE/DVT may occur after discharge    To decrease risks, take anticoagulation medicine if ordered by your doctor  Report any calf pain, swelling or tenderness and/or shortness of breath to your doctor immediately  Smoking Cessation   If you smoke, use tobacco or nicotine, and/or are exposed to second hand smoke, you are encouraged to stop to improve your health  If you need help quitting, please talk to your health care provider or call:  · [facility] ([phone])  · [facility] ([phone])   · [address] [place] [address] ([phone])      If your symptoms return or if your condition unexpectedly worsens from the time of discharge, notify your doctor or go to the nearest emergency room  If you think you are experiencing a medical emergency, call [phone]  Readmission Risk Score     Jez Garcia is committed to ensuring a safe discharge plan that will allow you to continue your recovery at home  Your risk for readmission has been determined to be HIGH      To prevent readmission, please be sure to:   See your health care provider within 1 week of discharge   Follow your discharge instructions    Take your medication as prescribed   Call your health care provider with any questions or concerns

## 2017-04-23 NOTE — PROGRESS NOTES
Patient became hypoglycemic again  The patient BS are 53 on the right hand, and 120 on the left hand  At this time the patient is asymtomatic  Will hold insulin, only give SSI #2 for coverage  BS checks every hour

## 2017-04-23 NOTE — ED NOTES
Dr Quinn was made aware of patient's sugar of 21, 1 amp of dextrose was administered   Dr Quinn at bedside     Bairon Smith, ANILA  04/23/17 9042

## 2017-04-23 NOTE — ED NOTES
Patient family Mane Barr  [facility]- [phone]  [facility]- [phone]       Nela Becerra RN  04/23/17 5631

## 2017-04-23 NOTE — ED NOTES
Pt IV attempted times 2 by RN and ER resident Peggy    Unable to obtain Iv access or blood work     Garrick Canavan, RN  13/15/49 0765

## 2017-04-23 NOTE — PROGRESS NOTES
Sugars returning to patient's baseline, no more hypoglycemia events  Unknown etiology his AM, likely given NPO status  Will restart Humalog 10 units with meals  Sliding scale coverage as well  Will start Lantus 20 units at bedtime  with goal of increasing to home Lantus 30 units at bedtime  Attempted to call Facility where the patient lives however no answer  LVM to return our call

## 2017-04-23 NOTE — ED NOTES
EKG completed at 0729  Viewed and signed by Dr Ab Ontiveros, copy on chart       Malvin Wills  04/23/17 8872

## 2017-04-23 NOTE — ED ATTENDING ATTESTATION
Senia Fitzgerald, DO, saw and evaluated the patient  I have discussed the patient with the resident/non-physician practitioner and agree with the resident's/non-physician practitioner's findings, Plan of Care, and MDM as documented in the resident's/non-physician practitioner's note, except where noted  All available labs and Radiology studies were reviewed  At this point I agree with the current assessment done in the Emergency Department  I have conducted an independent evaluation of this patient including a focused history and a physical exam     ED Note - Julissa Dodge 59 y o  female MRN: [id number]  Unit/Bed#: ED 20 Encounter: [id number]    History of Present Illness   HPI  Julissa Dodge is a 59 y o  female who presents for evaluation of hypoglycemia and hypothermia  States that her blood sugar does drop on occasion  C/o right sided/ flank pain described as an aching  Recent lithotripsy  No urinary symptoms  No n/v  Hx of pancreatic cancer  On insulin Lantus 30U; does not recall how many units she used last night  No other complaints  REVIEW OF SYSTEMS  See HPI for further details  12 systems reviewed and otherwise negative except as noted     Historical Information     PAST MEDICAL HISTORY  Past Medical History:   Diagnosis Date    Cancer     pancreatic    Chronic low back pain     COPD (chronic obstructive pulmonary disease)     CVA (cerebral vascular accident) nov 2017    very minimal left leg residual, difficulty lifting leg    Depression     Diabetes mellitus     type II    DVT (deep venous thrombosis)     GERD (gastroesophageal reflux disease)     Herniated lumbar intervertebral disc     Hx of hepatitis C     Hypertension     Insomnia     Kidney stones     Myocardial infarction 11/2015    Nephrolithiasis     2015, 2016    NSTEMI (non-ST elevated myocardial infarction)     Pancreatic cancer 2011    Pneumonia 11/2015    CAP    Pulmonary nodule        FAMILY HISTORY  Family History   Problem Relation Age of Onset    Hypertension Mother     Diabetes Father     No Known Problems Sister     Diabetes Maternal Grandmother     Diabetes Paternal Grandmother     Lupus Sister        SOCIAL HISTORY  Social History     Social History    Marital status: Single     Spouse name: N/A    Number of children: N/A    Years of education: N/A     Social History Main Topics    Smoking status: Current Every Day Smoker     Packs/day: 0 25    Smokeless tobacco: None    Alcohol use No    Drug use: No    Sexual activity: Not Asked     Other Topics Concern    None     Social History Narrative    None       SURGICAL HISTORY  Past Surgical History:   Procedure Laterality Date    APPENDECTOMY      CYST REMOVAL Right     ovary, age 13   [place] HYSTERECTOMY      KNEE SURGERY Right 2005    arthroscopy    PANCREATICODUODENECTOMY      W[place] CYSTO/URETERO W/LITHOTRIPSY &INDWELL STENT INSRT Right 3/1/2017    Procedure: CYSTOSCOPY; URETEROSCOPY; HOLMIUM LASER LITHOTRIPSY; BASKET STONE EXTRACTION; RETROGRADE PYELOGRAM; STENT INSERTION ;  Surgeon: Jeet Mcneal MD;  [facility];  Service: Urology    WHIPPLE PROCEDURE W/ LAPAROSCOPY       Meds/Allergies     CURRENT MEDICATIONS    Current Facility-Administered Medications:      EMS REPLENISHMENT MED, , Does not apply, Once, Anthony Qureshi, DO    sodium chloride 0 9 % bolus 1,000 mL, 1,000 mL, Intravenous, Once, Esteban Real MD    Current Outpatient Prescriptions:     amLODIPine (NORVASC) 5 mg tablet, Take 10 mg by mouth daily  , Disp: , Rfl:     ammonium lactate (LAC-HYDRIN) 12 % lotion, Apply topically as needed for dry skin, Disp: , Rfl:     aspirin 81 MG tablet, Take 81 mg by mouth daily, Disp: , Rfl:     azithromycin (ZITHROMAX) 250 mg tablet, Take first 2 tablets together, then 1 every day until finished , Disp: 6 tablet, Rfl: 0    cephalexin (KEFLEX) 500 mg capsule, Take 500 mg by mouth 4 (four) times a day, Disp: , Rfl:   dicyclomine (BENTYL) 10 mg capsule, Take 10 mg by mouth 4 (four) times a day (before meals and at bedtime), Disp: , Rfl:     FLUoxetine HCl (PROZAC PO), Take 40 mg by mouth daily  , Disp: , Rfl:     furosemide (LASIX) 20 mg tablet, Take 20 mg by mouth 2 (two) times a day, Disp: , Rfl:     gabapentin (NEURONTIN) 800 mg tablet, Take 800 mg by mouth 3 (three) times a day, Disp: , Rfl:     ibuprofen (MOTRIN) 600 mg tablet, Take 1 tablet by mouth every 8 (eight) hours as needed for moderate pain (pain), Disp: 15 tablet, Rfl: 0    insulin glargine (LANTUS) 100 units/mL subcutaneous injection, Inject 30 Units under the skin daily at bedtime, Disp: , Rfl:     Insulin Lispro (HUMALOG) 100 UNIT/ML SOCT, Inject 10 Units under the skin 3 (three) times a day before meals, Disp: , Rfl:     magnesium oxide (MAG-OX) 400 mg, Take 1 tablet by mouth 2 (two) times a day for 30 days, Disp: 60 tablet, Rfl: 0    metoprolol tartrate (LOPRESSOR) 25 mg tablet, Take 1 tablet by mouth every 12 (twelve) hours for 30 days, Disp: 60 tablet, Rfl: 0    mirtazapine (REMERON) 7 5 MG tablet, Take 7 5 mg by mouth daily at bedtime, Disp: , Rfl:     naproxen (NAPROSYN) 500 mg tablet, Take 1 tablet by mouth 2 (two) times a day as needed for mild pain for up to 30 days, Disp: 30 tablet, Rfl: 0    omeprazole (PriLOSEC) 20 mg delayed release capsule, Take 20 mg by mouth daily, Disp: , Rfl:     pancrelipase, Lip-Prot-Amyl, (CREON) 6,000 units delayed release capsule, Take 6,000 units of lipase by mouth 3 (three) times a day with meals, Disp: , Rfl:     pantoprazole (PROTONIX) 40 mg tablet, Take 1 tablet by mouth daily in the early morning for 30 days, Disp: 30 tablet, Rfl: 0    potassium chloride (K-DUR,KLOR-CON) 20 mEq tablet, Take 1 tablet by mouth 2 (two) times a day for 30 days, Disp: 60 tablet, Rfl: 0    Sennosides (SENNA) 8 8 mg/5 mL oral syrup, Take 5 mL by mouth daily at bedtime for 30 days, Disp: 150 mL, Rfl: 0    tamsulosin (FLOMAX) 0 4 mg, Take 1 capsule by mouth daily with dinner for 7 days, Disp: 7 capsule, Rfl: 0    (Not in a hospital admission)    ALLERGIES  Allergies   Allergen Reactions    Ciprofloxacin Rash     Objective     PHYSICAL EXAM    VITAL SIGNS: Blood pressure 167/76, pulse 74, resp  rate 16, weight 60 3 kg (133 lb), SpO2 100 %  Constitutional:  Well developed, well nourished, no acute distress, non-toxic appearance   Eyes:  PERRL, EOMI, conjunctivae pink, sclerae non-icteric, no nystagmus    HENT:  Normocephalic, abrasion to bridge of nose, no septal hematoma, Tympanic Membranes clear, no rhinorrhea, mucous membranes moist, no pharyngeal/tonsillar exudates or erythema, no oropharyngeal or tonsillar asymmetry  Neck: normal range of motion, no tenderness, supple   Respiratory:  No respiratory distress, normal breath sounds, no accessory muscle use, no intercostal retractions, no rales, no rhonchi, no wheezing, no stridor   Cardiovascular:  Normal rate, normal rhythm, no murmurs, no gallops, no rubs, peripheral pulses intact  GI:  Soft, + RLQ-tender, non-distended, no organomegaly, no mass, no rebound, no guarding   :  No CVAT, no flank ecchymosis, no erythema or crepitus   Musculoskeletal:  No swelling or edema, no tenderness, no deformities  Integument:  Pink, warm, dry, Well hydrated, no rash, no erythema, no bullae, no wounds   Lymphatic:  No cervical/ tonsillar/ submandibular lymphadenopathy noted   Neurologic:  Awake, Alert & oriented x 3, CN 2-12 intact, no focal neurological deficits, motor function intact, strength 5/5 all extremities, normal sensory function, reflexes within normal limits  Psychiatric:  Speech and behavior appropriate       ED COURSE and MDM:    Assessment/Plan   Assessment:  60 yo female presents for evaluation of hypoglycemia and hypothermia  States that her blood sugar does drop on occasion  C/o right sided/ flank pain described as an aching  Recent lithotripsy   No urinary symptoms  No n/v  Hx of pancreatic cancer  On insulin Lantus 30U; does not recall how many units she used last night  No other complaints  Plan:  Labs, CT H and a/p, IV fluids, sepsis workup, symptom management, warming, admission  Portions of the record may have been created with voice recognition software  Occasional wrong word or "sound a like" substitutions may have occurred due to the inherent limitations of voice recognition software       ED Provider  Electronically Signed by

## 2017-04-23 NOTE — ED NOTES
Pt incontinent of moderate amount of brown stool  Pt cleaned and repositioned       Bety Potts RN  16/82/38 0973

## 2017-04-23 NOTE — ED PROVIDER NOTES
History  Chief Complaint   Patient presents with    Hypoglycemia - Symptomatic     pt fell oob onto floor sttriking face on floor pt foound to have blood sugar of 32- glucogan given pre-[facility]osp[facility]  sugar increased to 58     HPI Comments: This is a 64-year-old female presenting from home with symptomatic hypoglycemia, was found to have a blood sugar in the 30s on EMS arrival, EMS was unable to get IV line established so she was given oral glucagon which raised glucose to 58, in the department she had a glucose of 120  She is a known diabetic and takes long-acting insulin before bed , however she does not remember how much she took last night  Normally takes 30 units of Lantus  She states she fell though unsure of the mechanism  She thinks she hit her head and there is an abrasion over the bridge of her nose w/o active bleeding  She otherwise has not had any fevers chills nausea vomiting diarrhea abdominal pain no chest pain or shortness of breath  was at her Baseline prior to this morning  In the department she is hypothermic to 93F rectal and complaining of feeling cold  Pt has a history of insulin-controlled DM, pancreatic CA, Kidney stones w/ recent lithotripsy    Patient is a 59 y o  female presenting with hypoglycemic associated symptoms  Hypoglycemia - Symptomatic   Initial blood sugar:  32  Blood sugar after intervention:  58  Severity:  Moderate  Onset quality:  Unable to specify  Chronicity:  Recurrent  Diabetic status:  Controlled with insulin  Associated symptoms: no dizziness and no weakness        Prior to Admission Medications   Prescriptions Last Dose Informant Patient Reported? Taking?    FLUoxetine HCl (PROZAC PO) Unknown at Unknown time  Yes No   Sig: Take 40 mg by mouth daily     Insulin Lispro (HUMALOG) 100 UNIT/ML SOCT 4/22/2017 at Unknown time  Yes Yes   Sig: Inject 10 Units under the skin 3 (three) times a day before meals   Sennosides (SENNA) 8 8 mg/5 mL oral syrup   No No   Sig: Take 5 mL by mouth daily at bedtime for 30 days   amLODIPine (NORVASC) 5 mg tablet 4/22/2017 at Unknown time  Yes Yes   Sig: Take 10 mg by mouth daily     ammonium lactate (LAC-HYDRIN) 12 % lotion 4/22/2017 at Unknown time  Yes Yes   Sig: Apply topically as needed for dry skin   aspirin 81 MG tablet 4/22/2017 at Unknown time  Yes Yes   Sig: Take 81 mg by mouth daily   azithromycin (ZITHROMAX) 250 mg tablet 4/22/2017 at Unknown time  No Yes   Sig: Take first 2 tablets together, then 1 every day until finished     cephalexin (KEFLEX) 500 mg capsule Unknown at Unknown time  Yes No   Sig: Take 500 mg by mouth 4 (four) times a day   dicyclomine (BENTYL) 10 mg capsule 4/22/2017 at Unknown time  Yes Yes   Sig: Take 10 mg by mouth 4 (four) times a day (before meals and at bedtime)   furosemide (LASIX) 20 mg tablet 4/22/2017 at Unknown time  Yes Yes   Sig: Take 20 mg by mouth 2 (two) times a day   gabapentin (NEURONTIN) 800 mg tablet 4/22/2017 at Unknown time  Yes Yes   Sig: Take 800 mg by mouth 3 (three) times a day   ibuprofen (MOTRIN) 600 mg tablet Unknown at Unknown time  No No   Sig: Take 1 tablet by mouth every 8 (eight) hours as needed for moderate pain (pain)   insulin glargine (LANTUS) 100 units/mL subcutaneous injection 4/22/2017 at Unknown time  Yes Yes   Sig: Inject 30 Units under the skin daily at bedtime   magnesium oxide (MAG-OX) 400 mg   No No   Sig: Take 1 tablet by mouth 2 (two) times a day for 30 days   metoprolol tartrate (LOPRESSOR) 25 mg tablet   No No   Sig: Take 1 tablet by mouth every 12 (twelve) hours for 30 days   mirtazapine (REMERON) 7 5 MG tablet 4/22/2017 at Unknown time  Yes Yes   Sig: Take 7 5 mg by mouth daily at bedtime   naproxen (NAPROSYN) 500 mg tablet Unknown at Unknown time  No No   Sig: Take 1 tablet by mouth 2 (two) times a day as needed for mild pain for up to 30 days   omeprazole (PriLOSEC) 20 mg delayed release capsule 4/22/2017 at Unknown time  Yes Yes   Sig: Take 20 mg by mouth daily pancrelipase, Lip-Prot-Amyl, (CREON) 6,000 units delayed release capsule 4/22/2017 at Unknown time  Yes Yes   Sig: Take 6,000 units of lipase by mouth 3 (three) times a day with meals   pantoprazole (PROTONIX) 40 mg tablet   No No   Sig: Take 1 tablet by mouth daily in the early morning for 30 days   potassium chloride (K-DUR,KLOR-CON) 20 mEq tablet   No No   Sig: Take 1 tablet by mouth 2 (two) times a day for 30 days   tamsulosin (FLOMAX) 0 4 mg 4/22/2017 at Unknown time  No Yes   Sig: Take 1 capsule by mouth daily with dinner for 7 days      Facility-Administered Medications: None       Past Medical History:   Diagnosis Date    Cancer     pancreatic    Chronic low back pain     COPD (chronic obstructive pulmonary disease)     CVA (cerebral vascular accident) nov 2017    very minimal left leg residual, difficulty lifting leg    Depression     Diabetes mellitus     type II    DVT (deep venous thrombosis)     GERD (gastroesophageal reflux disease)     Herniated lumbar intervertebral disc     Hx of hepatitis C     Hypertension     Insomnia     Kidney stones     Myocardial infarction 11/2015    Nephrolithiasis     2015, 2016    NSTEMI (non-ST elevated myocardial infarction)     Pancreatic cancer 2011    Pneumonia 11/2015    CAP    Pulmonary nodule        Past Surgical History:   Procedure Laterality Date    APPENDECTOMY      CYST REMOVAL Right     ovary, age 13   [facility] HYSTERECTOMY      KNEE SURGERY Right 2005    arthroscopy    PANCREATICODUODENECTOMY      VT CYSTO/URETERO W/LITHOTRIPSY &INDWELL STENT INSRT Right 3/1/2017    Procedure: CYSTOSCOPY; URETEROSCOPY; HOLMIUM LASER LITHOTRIPSY; BASKET STONE EXTRACTION; RETROGRADE PYELOGRAM; STENT INSERTION ;  Surgeon: Ashlie Guthrie MD;  [facility];  Service: Urology    WHIPPLE PROCEDURE W/ LAPAROSCOPY         Family History   Problem Relation Age of Onset    Hypertension Mother     Diabetes Father     No Known Problems Sister     Diabetes Maternal Grandmother     Diabetes Paternal Grandmother     Lupus Sister      I have reviewed and agree with the history as documented  Social History   Substance Use Topics    Smoking status: Current Every Day Smoker     Packs/day: 0 25     Years: 50 00    Smokeless tobacco: None      Comment: pt refused    Alcohol use No        Review of Systems   Constitutional: Positive for chills  Negative for appetite change and fever  HENT: Negative for rhinorrhea and sore throat  Eyes: Negative for photophobia and visual disturbance  Respiratory: Negative for cough, chest tightness and wheezing  Cardiovascular: Negative for chest pain, palpitations and leg swelling  Gastrointestinal: Negative for abdominal distention, abdominal pain, blood in stool, constipation and diarrhea  Genitourinary: Negative for dysuria, flank pain, frequency, hematuria and urgency  Musculoskeletal: Negative for back pain  Skin: Negative for rash  Neurological: Negative for dizziness, weakness and headaches  All other systems reviewed and are negative  Physical Exam    ED Triage Vitals   Temperature Pulse Respirations Blood Pressure SpO2   04/23/17 0502 04/23/17 0502 04/23/17 0502 04/23/17 0502 04/23/17 0502   93 °F (33 9 °C) 74 16 167/76 100 %      Temp Source Heart Rate Source Patient Position BP Location FiO2 (%)   04/23/17 0718 04/23/17 0638 04/23/17 0832 04/23/17 0832 --   Oral Monitor Lying Right arm       Pain Score       04/23/17 0638       No Pain           Physical Exam   Constitutional: She is oriented to person, place, and time  She appears well-developed and well-nourished  HENT:   Head: Normocephalic and atraumatic  Eyes: EOM are normal  Pupils are equal, round, and reactive to light  Neck: Normal range of motion  Neck supple  Cardiovascular: Normal rate and regular rhythm  Exam reveals no gallop and no friction rub  No murmur heard  Pulmonary/Chest: Effort normal  She has no wheezes   She has no rales  She exhibits no tenderness  Abdominal: Soft  She exhibits no distension and no mass  There is tenderness  There is no rebound and no guarding  Tender in the right lower quadrant without rebound or guarding   Musculoskeletal: She exhibits no edema, tenderness or deformity  Neurological: She is alert and oriented to person, place, and time  No cranial nerve deficit  Skin: Skin is warm and dry  No rash noted  No erythema  No pallor  Psychiatric: She has a normal mood and affect  Nursing note and vitals reviewed        ED Medications  Medications   amLODIPine (NORVASC) tablet 10 mg (10 mg Oral Given 4/24/17 0933)   dicyclomine (BENTYL) capsule 10 mg (10 mg Oral Given 4/25/17 0609)   FLUoxetine (PROzac) capsule 40 mg (40 mg Oral Given 4/24/17 0933)   gabapentin (NEURONTIN) capsule 800 mg (800 mg Oral Given 4/24/17 2219)   pantoprazole (PROTONIX) EC tablet 20 mg (20 mg Oral Given 4/25/17 0625)   senna (SENOKOT) tablet 8 6 mg (8 6 mg Oral Given 4/24/17 0933)   ondansetron (ZOFRAN) injection 4 mg (not administered)   heparin (porcine) subcutaneous injection 5,000 Units (5,000 Units Subcutaneous Given 4/25/17 0609)   acetaminophen (TYLENOL) tablet 650 mg (not administered)   insulin lispro (HumaLOG) 100 units/mL subcutaneous injection 1-5 Units (2 Units Subcutaneous Given 4/24/17 1856)   phenazopyridine (PYRIDIUM) tablet 100 mg (100 mg Oral Given 4/24/17 1901)   pancrelipase (Lip-Prot-Amyl) (CREON) delayed release capsule 6,000 Units (6,000 Units Oral Given 4/24/17 1855)   traMADol (ULTRAM) tablet 50 mg (50 mg Oral Given 4/24/17 1855)   traZODone (DESYREL) tablet 50 mg (50 mg Oral Given 4/24/17 2219)   tamsulosin (FLOMAX) capsule 0 4 mg (0 4 mg Oral Given 4/24/17 1856)   albuterol (PROVENTIL HFA,VENTOLIN HFA) inhaler 2 puff (not administered)   insulin glargine (LANTUS) subcutaneous injection 15 Units (15 Units Subcutaneous Given 4/24/17 2219)   docusate sodium (COLACE) capsule 100 mg (100 mg Oral Given 4/24/17 1855)   polyethylene glycol (MIRALAX) packet 17 g (17 g Oral Given 4/24/17 1202)   sodium phosphate-biphosphate (FLEET) enema 1 enema (not administered)   budesonide-formoterol (SYMBICORT) 160-4 5 mcg/act inhaler 2 puff (2 puffs Inhalation Given 4/24/17 2104)    EMS REPLENISHMENT MED ( Does not apply Given to EMS 4/23/17 0515)   sodium chloride 0 9 % bolus 1,000 mL (0 mL Intravenous Stopped 4/23/17 0833)   tetanus-diphtheria-acellular pertussis (BOOSTRIX) IM injection 0 5 mL (0 5 mL Intramuscular Given 4/23/17 0724)   cefepime (MAXIPIME) 2 g/50 mL dextrose IVPB (0 mg Intravenous Stopped 4/23/17 0926)   vancomycin (VANCOCIN) 1,250 mg in sodium chloride 0 9 % 250 mL IVPB (0 mg Intravenous Stopped 4/23/17 1013)   dextrose 50 % IV solution **AcuDose Override Pull** (50 mL  Given 4/23/17 1018)   iohexol (OMNIPAQUE) 350 MG/ML injection (MULTI-DOSE) 80 mL (80 mL Intravenous Given 4/23/17 1840)   dextrose 50 % IV solution **AcuDose Override Pull** (50 mL  Given 4/24/17 0246)   potassium chloride (K-DUR,KLOR-CON) CR tablet 40 mEq (40 mEq Oral Given 4/24/17 1855)   calcium gluconate 1 g in sodium chloride 0 9 % 100 mL IVPB (1 g Intravenous New Bag 4/24/17 2056)   potassium citrate (UROCIT-K) CR tablet 10 mEq (10 mEq Oral Given 4/24/17 2055)   sodium chloride 0 9 % bolus 500 mL (500 mL Intravenous New Bag 4/24/17 2210)       Diagnostic Studies  Labs Reviewed   CBC AND DIFFERENTIAL - Abnormal        Result Value Ref Range Status    RBC 3 39 (*) 3 81 - 5 12 Million/uL Final    Hemoglobin 9 4 (*) 11 5 - 15 4 g/dL Final    Hematocrit 28 0 (*) 34 8 - 46 1 % Final    RDW 17 4 (*) 11 6 - 15 1 % Final    Neutrophils Relative 83 (*) 43 - 75 % Final    Lymphocytes Relative 12 (*) 14 - 44 % Final    WBC 7 65  4 31 - 10 16 Thousand/uL Final    MCV 83  82 - 98 fL Final    MCH 27 7  26 8 - 34 3 pg Final    MCHC 33 6  31 4 - 37 4 g/dL Final    MPV 10 4  8 9 - 12 7 fL Final    Platelets 795  903 - 390 Thousands/uL Final    nRBC 0  /100 WBCs Final    Monocytes Relative 5  4 - 12 % Final    Eosinophils Relative 0  0 - 6 % Final    Basophils Relative 0  0 - 1 % Final    Neutrophils Absolute 6 26  1 85 - 7 62 Thousands/µL Final    Lymphocytes Absolute 0 95  0 60 - 4 47 Thousands/µL Final    Monocytes Absolute 0 39  0 17 - 1 22 Thousand/µL Final    Eosinophils Absolute 0 01  0 00 - 0 61 Thousand/µL Final    Basophils Absolute 0 01  0 00 - 0 10 Thousands/µL Final   COMPREHENSIVE METABOLIC PANEL - Abnormal     Chloride 109 (*) 100 - 108 mmol/L Final    Glucose 145 (*) 65 - 140 mg/dL Final    Comment: If the patient is fasting, the ADA then defines impaired fasting glucose as > 100 mg/dL and diabetes as > or equal to 123 mg/dL  Calcium 6 6 (*) 8 3 - 10 1 mg/dL Final    Alkaline Phosphatase 145 (*) 46 - 116 U/L Final    Albumin 2 2 (*) 3 5 - 5 0 g/dL Final    Sodium 141  136 - 145 mmol/L Final    Potassium 4 3  3 5 - 5 3 mmol/L Final    CO2 21  21 - 32 mmol/L Final    Anion Gap 11  4 - 13 mmol/L Final    BUN 14  5 - 25 mg/dL Final    Creatinine 0 81  0 60 - 1 30 mg/dL Final    Comment: Standardized to IDMS reference method    AST 18  5 - 45 U/L Final    ALT 17  12 - 78 U/L Final    Total Protein 6 4  6 4 - 8 2 g/dL Final    Total Bilirubin 0 24  0 20 - 1 00 mg/dL Final    eGFR >60 0  ml/min/1 73sq m Final    Narrative:     National Kidney Disease Education Program recommendations are as follows:  GFR calculation is accurate only with a steady state creatinine  Chronic Kidney disease less than 60 ml/min/1 73 sq  meters  Kidney failure less than 15 ml/min/1 73 sq  meters  LIPASE - Abnormal     Lipase 32 (*) 73 - 393 u/L Final   APTT - Abnormal     PTT 75 (*) 24 - 36 seconds Final    Narrative:      Therapeutic Heparin Range = 60-90 seconds   URINE MICROSCOPIC - Abnormal     WBC, UA Innumerable (*) None Seen /hpf Final    Bacteria, UA Moderate (*) None Seen, Occasional /hpf Final    RBC, UA None Seen  None Seen /hpf Final    Epithelial Cells Occasional  None Seen, Occasional /hpf Final    OTHER OBSERVATIONS Yeast Cells Present   Final   BASIC METABOLIC PANEL - Abnormal     Chloride 110 (*) 100 - 108 mmol/L Final    Calcium 7 4 (*) 8 3 - 10 1 mg/dL Final    Sodium 142  136 - 145 mmol/L Final    Potassium 3 5  3 5 - 5 3 mmol/L Final    CO2 23  21 - 32 mmol/L Final    Anion Gap 9  4 - 13 mmol/L Final    BUN 10  5 - 25 mg/dL Final    Creatinine 0 72  0 60 - 1 30 mg/dL Final    Comment: Standardized to IDMS reference method    Glucose 80  65 - 140 mg/dL Final    Comment: If the patient is fasting, the ADA then defines impaired fasting glucose as > 100 mg/dL and diabetes as > or equal to 123 mg/dL  eGFR >60 0  ml/min/1 73sq m Final    Narrative:     National Kidney Disease Education Program recommendations are as follows:  GFR calculation is accurate only with a steady state creatinine  Chronic Kidney disease less than 60 ml/min/1 73 sq  meters  Kidney failure less than 15 ml/min/1 73 sq  meters     CBC AND PLATELET - Abnormal     WBC 3 29 (*) 4 31 - 10 16 Thousand/uL Final    RBC 2 94 (*) 3 81 - 5 12 Million/uL Final    Hemoglobin 8 1 (*) 11 5 - 15 4 g/dL Final    Hematocrit 24 6 (*) 34 8 - 46 1 % Final    RDW 17 9 (*) 11 6 - 15 1 % Final    Platelets 108 (*) 077 - 390 Thousands/uL Final    MCV 84  82 - 98 fL Final    MCH 27 6  26 8 - 34 3 pg Final    MCHC 32 9  31 4 - 37 4 g/dL Final    MPV 10 8  8 9 - 12 7 fL Final   CBC AND PLATELET - Abnormal     WBC 3 37 (*) 4 31 - 10 16 Thousand/uL Final    RBC 3 00 (*) 3 81 - 5 12 Million/uL Final    Hemoglobin 8 3 (*) 11 5 - 15 4 g/dL Final    Hematocrit 25 4 (*) 34 8 - 46 1 % Final    RDW 18 1 (*) 11 6 - 15 1 % Final    Platelets 175 (*) 138 - 390 Thousands/uL Final    MCV 85  82 - 98 fL Final    MCH 27 7  26 8 - 34 3 pg Final    MCHC 32 7  31 4 - 37 4 g/dL Final    MPV 10 9  8 9 - 12 7 fL Final   ED URINE MACROSCOPIC - Abnormal     Leukocytes, UA Moderate (*) Negative Final    Nitrite, UA Positive (*) Negative Final    Blood, UA Moderate (*) Negative Final    Color, UA Yellow   Final    Clarity, UA Clear   Final    pH, UA 5 5  4 5 - 8 0 Final    Protein, UA Negative  Negative mg/dl Final    Glucose, UA Negative  Negative mg/dl Final    Ketones, UA Negative  Negative mg/dl Final    Urobilinogen, UA 0 2  0 2, 1 0 E U /dl E U /dl Final    Bilirubin, UA Negative  Negative Final    Specific [place], UA 1 015  1 003 - 1 030 Final    Narrative:     CLINITEK RESULT   POCT GLUCOSE - Abnormal     POC Glucose 21 (*) 65 - 140 mg/dl Final   POCT GLUCOSE - Abnormal     POC Glucose 209 (*) 65 - 140 mg/dl Final   POCT GLUCOSE - Abnormal     POC Glucose 488 (*) 65 - 140 mg/dl Final   POCT GLUCOSE - Abnormal     POC Glucose 302 (*) 65 - 140 mg/dl Final   POCT GLUCOSE - Abnormal     POC Glucose >500 (*) 65 - 140 mg/dl Final   POCT GLUCOSE - Abnormal     POC Glucose 295 (*) 65 - 140 mg/dl Final   POCT GLUCOSE - Abnormal     POC Glucose 274 (*) 65 - 140 mg/dl Final   POCT GLUCOSE - Abnormal     POC Glucose 262 (*) 65 - 140 mg/dl Final   POCT GLUCOSE - Abnormal     POC Glucose 58 (*) 65 - 140 mg/dl Final   POCT GLUCOSE - Abnormal     POC Glucose 160 (*) 65 - 140 mg/dl Final   POCT GLUCOSE - Abnormal     POC Glucose 146 (*) 65 - 140 mg/dl Final   POCT GLUCOSE - Abnormal     POC Glucose 401 (*) 65 - 140 mg/dl Final   POCT GLUCOSE - Abnormal     POC Glucose 190 (*) 65 - 140 mg/dl Final   POCT GLUCOSE - Abnormal     POC Glucose 172 (*) 65 - 140 mg/dl Final   POCT GLUCOSE - Abnormal     POC Glucose 60 (*) 65 - 140 mg/dl Final   POCT GLUCOSE - Abnormal     POC Glucose 176 (*) 65 - 140 mg/dl Final   POCT GLUCOSE - Abnormal     POC Glucose 266 (*) 65 - 140 mg/dl Final   POCT GLUCOSE - Abnormal     POC Glucose 167 (*) 65 - 140 mg/dl Final   POCT GLUCOSE - Abnormal     POC Glucose 210 (*) 65 - 140 mg/dl Final   POCT GLUCOSE - Abnormal     POC Glucose 291 (*) 65 - 140 mg/dl Final   LACTIC ACID, PLASMA - Normal    LACTIC ACID 1 1  0 5 - 2 0 mmol/L Final Narrative:     Result may be elevated if tourniquet was used during collection  PROTIME-INR - Normal    Protime 13 5  12 0 - 14 3 seconds Final    INR 1 02  0 86 - 1 16 Final   POCT URINALYSIS DIPSTICK - Normal    Color, UA yellow   Final   POCT GLUCOSE - Normal    POC Glucose 121  65 - 140 mg/dl Final   POCT GLUCOSE - Normal    POC Glucose 100  65 - 140 mg/dl Final   POCT GLUCOSE - Normal    POC Glucose 140  65 - 140 mg/dl Final   POCT GLUCOSE - Normal    POC Glucose 116  65 - 140 mg/dl Final   POCT GLUCOSE - Normal    POC Glucose 71  65 - 140 mg/dl Final   POCT GLUCOSE - Normal    POC Glucose 75  65 - 140 mg/dl Final   POCT GLUCOSE - Normal    POC Glucose 96  65 - 140 mg/dl Final   POCT GLUCOSE - Normal    POC Glucose 90  65 - 140 mg/dl Final   POCT GLUCOSE - Normal    POC Glucose 75  65 - 140 mg/dl Final   POCT GLUCOSE - Normal    POC Glucose 73  65 - 140 mg/dl Final   POCT GLUCOSE - Normal    POC Glucose 80  65 - 140 mg/dl Final   POCT GLUCOSE - Normal    POC Glucose 131  65 - 140 mg/dl Final   BLOOD CULTURE    Blood Culture No Growth at 24 hrs  Preliminary   BLOOD CULTURE    Blood Culture No Growth at 24 hrs  Preliminary   URINE CULTURE    Urine Culture     Preliminary    Value: 20,000-29,000 cfu/ml Gram Negative Frantz Enteric Like    Urine Culture 60,000-69,000 cfu/ml Enterococcus species   Preliminary   PLATELET COUNT   C-PEPTIDE   PROINSULIN   BASIC METABOLIC PANEL       CT chest w contrast   Final Result      Persistent right lower lobe consolidation that is improved since the prior study of 2/22/2017  However, the persistence since 2/22/2017 still raises the possibility of other process such as chronic eosinophilic pneumonia or even possibility of    neoplastic process  Bronchoscopy should be considered for further evaluation              ##[email or web address]##sigslh                Workstation performed: [id number]         XR chest pa & lateral   Final Result      Posterior right lung base pneumonia, persistent at least as far back as February 22, 2017  The possibility of developing endobronchial right lower lobe mass resulting in obstructive pneumonia must be considered  Consider chest CT follow-up  ##[email or web address]##sigslh         Workstation performed: [id number]         CT abdomen pelvis wo contrast   Final Result      1  Stable postoperative changes, following Whipple procedure  2   Hepatic steatosis  3   1 5 cm right lower pole renal calculus  4   No evidence of acute abnormality in the abdomen or pelvis  5   Right lower lobe pulmonary consolidation, most likely pneumonia  Workstation performed: [id number]         CT head wo contrast   Final Result   No acute intracranial abnormality  Workstation performed: [id number]             Procedures  Procedures      Phone Consults  ED Phone Contact    ED Course  ED Course                 Initial Sepsis Assessment    Suspected source of infection:  suspect infection, source unknown[AM1 1]   SIRS Criteria:  hypothermia, altered mental status[AM1 1]   If the answer is yes to both questions, suspicion of infection is present:     Obtain: Coagulation profile, lactic acid, blood cultures, CBC with differential, CMP:     At the physician's discretion obtain: UA, chest x-ray, amylase, lipase, ABG, CRP, CT scan:     If suspicion of infection is present AND organ dysfunction is present, the patient meets the criteria for SEVERE SEPSIS:     Tissue hypoperfusion persists in the hour after crystalloid fluid administration, evidenced, by either:     OR   If severe sepsis is present AND tissue hypoperfusion perists in the hour after fluid resuscitation or lactate > 4, the patient meets criteria for SEPTIC SHOCK:        Attribution     AM1  [address] [place], [place] 04/23/17 08:36                  MDM  Number of Diagnoses or Management Options  Diagnosis management comments: This is a 57-year-old female coming in with symptomatic hypoglycemia, hypothermia  Will get a septic evaluation to rule out infectious source, patient will be admitted for further care     CritCare Time    Disposition  Final diagnoses:   Sepsis   UTI (urinary tract infection)   Pneumonia   Hypoglycemia     ED Disposition     ED Disposition Condition Comment    Admit  Case was discussed with Family Medicine and the patient's admission status was agreed to be Admission Status: inpatient status to the service of Dr Azalea Castellanos          Follow-up Information     Follow up With Details Comments Avis Goncalves MD  Appointment is Monday June 5th at 11:30AM in our [place] office  Breathing test and CT will need to be done prior to this appointment  Our office will mail you the information  [address]  [phone]          Current Discharge Medication List      CONTINUE these medications which have NOT CHANGED    Details   amLODIPine (NORVASC) 5 mg tablet Take 10 mg by mouth daily        ammonium lactate (LAC-HYDRIN) 12 % lotion Apply topically as needed for dry skin      aspirin 81 MG tablet Take 81 mg by mouth daily      azithromycin (ZITHROMAX) 250 mg tablet Take first 2 tablets together, then 1 every day until finished    Qty: 6 tablet, Refills: 0      dicyclomine (BENTYL) 10 mg capsule Take 10 mg by mouth 4 (four) times a day (before meals and at bedtime)      furosemide (LASIX) 20 mg tablet Take 20 mg by mouth 2 (two) times a day      gabapentin (NEURONTIN) 800 mg tablet Take 800 mg by mouth 3 (three) times a day      insulin glargine (LANTUS) 100 units/mL subcutaneous injection Inject 30 Units under the skin daily at bedtime      Insulin Lispro (HUMALOG) 100 UNIT/ML SOCT Inject 10 Units under the skin 3 (three) times a day before meals      mirtazapine (REMERON) 7 5 MG tablet Take 7 5 mg by mouth daily at bedtime      omeprazole (PriLOSEC) 20 mg delayed release capsule Take 20 mg by mouth daily      pancrelipase, Lip-Prot-Amyl, (CREON) 6,000 units delayed release capsule Take 6,000 units of lipase by mouth 3 (three) times a day with meals      tamsulosin (FLOMAX) 0 4 mg Take 1 capsule by mouth daily with dinner for 7 days  Qty: 7 capsule, Refills: 0      cephalexin (KEFLEX) 500 mg capsule Take 500 mg by mouth 4 (four) times a day      FLUoxetine HCl (PROZAC PO) Take 40 mg by mouth daily        ibuprofen (MOTRIN) 600 mg tablet Take 1 tablet by mouth every 8 (eight) hours as needed for moderate pain (pain)  Qty: 15 tablet, Refills: 0      magnesium oxide (MAG-OX) 400 mg Take 1 tablet by mouth 2 (two) times a day for 30 days  Qty: 60 tablet, Refills: 0      metoprolol tartrate (LOPRESSOR) 25 mg tablet Take 1 tablet by mouth every 12 (twelve) hours for 30 days  Qty: 60 tablet, Refills: 0      naproxen (NAPROSYN) 500 mg tablet Take 1 tablet by mouth 2 (two) times a day as needed for mild pain for up to 30 days  Qty: 30 tablet, Refills: 0      pantoprazole (PROTONIX) 40 mg tablet Take 1 tablet by mouth daily in the early morning for 30 days  Qty: 30 tablet, Refills: 0      potassium chloride (K-DUR,KLOR-CON) 20 mEq tablet Take 1 tablet by mouth 2 (two) times a day for 30 days  Qty: 60 tablet, Refills: 0      Sennosides (SENNA) 8 8 mg/5 mL oral syrup Take 5 mL by mouth daily at bedtime for 30 days  Qty: 150 mL, Refills: 0           No discharge procedures on file  ED Provider  Attending physically available and evaluated Marcelo Baeza I managed the patient along with the ED Attending      Electronically Signed by       Evelina Mcdonnell MD  Resident  04/25/17 0900

## 2017-04-23 NOTE — ED NOTES
Spoke with Dr Trivedi regarding patient's port not being a powerport - CT changed to without contrast     Rita Walker RN  04/23/17 8984

## 2017-04-24 LAB
AMPHETAMINES SERPL QL SCN: NEGATIVE
ANION GAP SERPL CALCULATED.3IONS-SCNC: 9 MMOL/L (ref 4–13)
BARBITURATES UR QL: NEGATIVE
BENZODIAZ UR QL: NEGATIVE
BUN SERPL-MCNC: 10 MG/DL (ref 5–25)
CALCIUM SERPL-MCNC: 7.4 MG/DL (ref 8.3–10.1)
CHLORIDE SERPL-SCNC: 110 MMOL/L (ref 100–108)
CO2 SERPL-SCNC: 23 MMOL/L (ref 21–32)
COCAINE UR QL: NEGATIVE
CREAT SERPL-MCNC: 0.72 MG/DL (ref 0.6–1.3)
ERYTHROCYTE [DISTWIDTH] IN BLOOD BY AUTOMATED COUNT: 17.9 % (ref 11.6–15.1)
GFR SERPL CREATININE-BSD FRML MDRD: >60 ML/MIN/1.73SQ M
GLUCOSE SERPL-MCNC: 116 MG/DL (ref 65–140)
GLUCOSE SERPL-MCNC: 131 MG/DL (ref 65–140)
GLUCOSE SERPL-MCNC: 167 MG/DL (ref 65–140)
GLUCOSE SERPL-MCNC: 176 MG/DL (ref 65–140)
GLUCOSE SERPL-MCNC: 210 MG/DL (ref 65–140)
GLUCOSE SERPL-MCNC: 266 MG/DL (ref 65–140)
GLUCOSE SERPL-MCNC: 291 MG/DL (ref 65–140)
GLUCOSE SERPL-MCNC: 60 MG/DL (ref 65–140)
GLUCOSE SERPL-MCNC: 71 MG/DL (ref 65–140)
GLUCOSE SERPL-MCNC: 73 MG/DL (ref 65–140)
GLUCOSE SERPL-MCNC: 75 MG/DL (ref 65–140)
GLUCOSE SERPL-MCNC: 75 MG/DL (ref 65–140)
GLUCOSE SERPL-MCNC: 80 MG/DL (ref 65–140)
GLUCOSE SERPL-MCNC: 80 MG/DL (ref 65–140)
GLUCOSE SERPL-MCNC: 90 MG/DL (ref 65–140)
GLUCOSE SERPL-MCNC: 96 MG/DL (ref 65–140)
HCT VFR BLD AUTO: 24.6 % (ref 34.8–46.1)
HGB BLD-MCNC: 8.1 G/DL (ref 11.5–15.4)
MCH RBC QN AUTO: 27.6 PG (ref 26.8–34.3)
MCHC RBC AUTO-ENTMCNC: 32.9 G/DL (ref 31.4–37.4)
MCV RBC AUTO: 84 FL (ref 82–98)
METHADONE UR QL: NEGATIVE
OPIATES UR QL SCN: NEGATIVE
PCP UR QL: NEGATIVE
PLATELET # BLD AUTO: 130 THOUSANDS/UL (ref 149–390)
PMV BLD AUTO: 10.8 FL (ref 8.9–12.7)
POTASSIUM SERPL-SCNC: 3.5 MMOL/L (ref 3.5–5.3)
RBC # BLD AUTO: 2.94 MILLION/UL (ref 3.81–5.12)
SODIUM SERPL-SCNC: 142 MMOL/L (ref 136–145)
THC UR QL: NEGATIVE
WBC # BLD AUTO: 3.29 THOUSAND/UL (ref 4.31–10.16)

## 2017-04-24 PROCEDURE — 80048 BASIC METABOLIC PNL TOTAL CA: CPT | Performed by: FAMILY MEDICINE

## 2017-04-24 PROCEDURE — 82948 REAGENT STRIP/BLOOD GLUCOSE: CPT

## 2017-04-24 PROCEDURE — 85027 COMPLETE CBC AUTOMATED: CPT | Performed by: FAMILY MEDICINE

## 2017-04-24 RX ORDER — POTASSIUM CHLORIDE 20 MEQ/1
40 TABLET, EXTENDED RELEASE ORAL ONCE
Status: COMPLETED | OUTPATIENT
Start: 2017-04-24 | End: 2017-04-24

## 2017-04-24 RX ORDER — BUDESONIDE AND FORMOTEROL FUMARATE DIHYDRATE 160; 4.5 UG/1; UG/1
2 AEROSOL RESPIRATORY (INHALATION)
Status: DISCONTINUED | OUTPATIENT
Start: 2017-04-24 | End: 2017-04-25 | Stop reason: HOSPADM

## 2017-04-24 RX ORDER — SODIUM PHOSPHATE, DIBASIC AND SODIUM PHOSPHATE, MONOBASIC 7; 19 G/133ML; G/133ML
1 ENEMA RECTAL DAILY PRN
Status: DISCONTINUED | OUTPATIENT
Start: 2017-04-24 | End: 2017-04-25 | Stop reason: HOSPADM

## 2017-04-24 RX ORDER — DOCUSATE SODIUM 100 MG/1
100 CAPSULE, LIQUID FILLED ORAL 2 TIMES DAILY
Status: DISCONTINUED | OUTPATIENT
Start: 2017-04-24 | End: 2017-04-25 | Stop reason: HOSPADM

## 2017-04-24 RX ORDER — DEXTROSE AND SODIUM CHLORIDE 5; .9 G/100ML; G/100ML
50 INJECTION, SOLUTION INTRAVENOUS CONTINUOUS
Status: DISCONTINUED | OUTPATIENT
Start: 2017-04-24 | End: 2017-04-24

## 2017-04-24 RX ORDER — POTASSIUM CITRATE 10 MEQ/1
10 TABLET, EXTENDED RELEASE ORAL ONCE
Status: COMPLETED | OUTPATIENT
Start: 2017-04-24 | End: 2017-04-24

## 2017-04-24 RX ORDER — DEXTROSE MONOHYDRATE 25 G/50ML
INJECTION, SOLUTION INTRAVENOUS
Status: COMPLETED
Start: 2017-04-24 | End: 2017-04-24

## 2017-04-24 RX ORDER — POLYETHYLENE GLYCOL 3350 17 G/17G
17 POWDER, FOR SOLUTION ORAL DAILY
Status: DISCONTINUED | OUTPATIENT
Start: 2017-04-24 | End: 2017-04-25 | Stop reason: HOSPADM

## 2017-04-24 RX ADMIN — AMLODIPINE BESYLATE 10 MG: 10 TABLET ORAL at 09:33

## 2017-04-24 RX ADMIN — GABAPENTIN 800 MG: 400 CAPSULE ORAL at 18:55

## 2017-04-24 RX ADMIN — POTASSIUM CHLORIDE 40 MEQ: 1500 TABLET, EXTENDED RELEASE ORAL at 18:55

## 2017-04-24 RX ADMIN — CALCIUM GLUCONATE 1 G: 94 INJECTION, SOLUTION INTRAVENOUS at 20:56

## 2017-04-24 RX ADMIN — TRAMADOL HYDROCHLORIDE 50 MG: 50 TABLET, COATED ORAL at 05:48

## 2017-04-24 RX ADMIN — HEPARIN SODIUM 5000 UNITS: 5000 INJECTION, SOLUTION INTRAVENOUS; SUBCUTANEOUS at 05:48

## 2017-04-24 RX ADMIN — INSULIN LISPRO 2 UNITS: 100 INJECTION, SOLUTION INTRAVENOUS; SUBCUTANEOUS at 09:47

## 2017-04-24 RX ADMIN — DICYCLOMINE HYDROCHLORIDE 10 MG: 10 CAPSULE ORAL at 12:02

## 2017-04-24 RX ADMIN — CEFEPIME 2000 MG: 2 INJECTION, POWDER, FOR SOLUTION INTRAMUSCULAR; INTRAVENOUS at 09:33

## 2017-04-24 RX ADMIN — PANCRELIPASE 6000 UNITS: 30000; 6000; 19000 CAPSULE, DELAYED RELEASE PELLETS ORAL at 09:32

## 2017-04-24 RX ADMIN — PANCRELIPASE 6000 UNITS: 30000; 6000; 19000 CAPSULE, DELAYED RELEASE PELLETS ORAL at 12:02

## 2017-04-24 RX ADMIN — PHENAZOPYRIDINE HYDROCHLORIDE 100 MG: 100 TABLET ORAL at 19:01

## 2017-04-24 RX ADMIN — FLUOXETINE 40 MG: 20 CAPSULE ORAL at 09:33

## 2017-04-24 RX ADMIN — HEPARIN SODIUM 5000 UNITS: 5000 INJECTION, SOLUTION INTRAVENOUS; SUBCUTANEOUS at 22:25

## 2017-04-24 RX ADMIN — TRAMADOL HYDROCHLORIDE 50 MG: 50 TABLET, COATED ORAL at 18:55

## 2017-04-24 RX ADMIN — TRAZODONE HYDROCHLORIDE 50 MG: 50 TABLET ORAL at 22:19

## 2017-04-24 RX ADMIN — DICYCLOMINE HYDROCHLORIDE 10 MG: 10 CAPSULE ORAL at 05:48

## 2017-04-24 RX ADMIN — INSULIN LISPRO 2 UNITS: 100 INJECTION, SOLUTION INTRAVENOUS; SUBCUTANEOUS at 18:56

## 2017-04-24 RX ADMIN — PHENAZOPYRIDINE HYDROCHLORIDE 100 MG: 100 TABLET ORAL at 09:34

## 2017-04-24 RX ADMIN — TRAMADOL HYDROCHLORIDE 50 MG: 50 TABLET, COATED ORAL at 12:02

## 2017-04-24 RX ADMIN — PANTOPRAZOLE SODIUM 20 MG: 20 TABLET, DELAYED RELEASE ORAL at 05:48

## 2017-04-24 RX ADMIN — BUDESONIDE AND FORMOTEROL FUMARATE DIHYDRATE 2 PUFF: 160; 4.5 AEROSOL RESPIRATORY (INHALATION) at 21:04

## 2017-04-24 RX ADMIN — DEXTROSE AND SODIUM CHLORIDE 100 ML/HR: 5; .9 INJECTION, SOLUTION INTRAVENOUS at 05:29

## 2017-04-24 RX ADMIN — PANCRELIPASE 6000 UNITS: 30000; 6000; 19000 CAPSULE, DELAYED RELEASE PELLETS ORAL at 18:55

## 2017-04-24 RX ADMIN — DICYCLOMINE HYDROCHLORIDE 10 MG: 10 CAPSULE ORAL at 18:56

## 2017-04-24 RX ADMIN — PHENAZOPYRIDINE HYDROCHLORIDE 100 MG: 100 TABLET ORAL at 12:03

## 2017-04-24 RX ADMIN — INSULIN GLARGINE 15 UNITS: 100 INJECTION, SOLUTION SUBCUTANEOUS at 22:19

## 2017-04-24 RX ADMIN — POLYETHYLENE GLYCOL 3350 17 G: 17 POWDER, FOR SOLUTION ORAL at 12:02

## 2017-04-24 RX ADMIN — SODIUM CHLORIDE 500 ML: 0.9 INJECTION, SOLUTION INTRAVENOUS at 22:10

## 2017-04-24 RX ADMIN — INSULIN LISPRO 1 UNITS: 100 INJECTION, SOLUTION INTRAVENOUS; SUBCUTANEOUS at 14:15

## 2017-04-24 RX ADMIN — GABAPENTIN 800 MG: 400 CAPSULE ORAL at 22:19

## 2017-04-24 RX ADMIN — DOCUSATE SODIUM 100 MG: 100 CAPSULE, LIQUID FILLED ORAL at 18:55

## 2017-04-24 RX ADMIN — GABAPENTIN 800 MG: 400 CAPSULE ORAL at 09:33

## 2017-04-24 RX ADMIN — HEPARIN SODIUM 5000 UNITS: 5000 INJECTION, SOLUTION INTRAVENOUS; SUBCUTANEOUS at 14:14

## 2017-04-24 RX ADMIN — DICYCLOMINE HYDROCHLORIDE 10 MG: 10 CAPSULE ORAL at 22:19

## 2017-04-24 RX ADMIN — SENNOSIDES 8.6 MG: 8.6 TABLET, FILM COATED ORAL at 09:33

## 2017-04-24 RX ADMIN — DICYCLOMINE HYDROCHLORIDE 10 MG: 10 CAPSULE ORAL at 08:12

## 2017-04-24 RX ADMIN — DEXTROSE MONOHYDRATE 50 ML: 25 INJECTION, SOLUTION INTRAVENOUS at 02:46

## 2017-04-24 RX ADMIN — POTASSIUM CITRATE 10 MEQ: 10 TABLET, EXTENDED RELEASE ORAL at 20:55

## 2017-04-24 RX ADMIN — TAMSULOSIN HYDROCHLORIDE 0.4 MG: 0.4 CAPSULE ORAL at 18:56

## 2017-04-24 NOTE — PROGRESS NOTES
Progress Note - Roma Velázquez 59 y o  female MRN: [id number]    Unit/Bed#: [facility] 610-01 Encounter: [id number]      Assessment:     Problem List Items Addressed This Visit     UTI (urinary tract infection)    Relevant Medications    azithromycin (ZITHROMAX) 250 mg in sodium chloride 0 9 % 250 mL IVPB    cefepime (MAXIPIME) 2,000 mg in dextrose 5 % 50 mL IVPB          Patient Active Problem List   Diagnosis    Hypertension    Diabetes mellitus    Kidney stones    Herniated lumbar intervertebral disc    COPD (chronic obstructive pulmonary disease)    CAP (community acquired pneumonia)    Acute respiratory failure with hypoxia    Anemia    Depression    IVDU (intravenous drug user)    NSTEMI (non-ST elevated myocardial infarction) II    Hepatitis C    Encephalopathy acute, likely metabolic    Hypernatremia    RLL pneumonia    UTI (urinary tract infection)    Hypothermia       Plan:   Summary: 60 yo F w/hypoglycemia  with unclear etiology in insulin-dependent DM2 pending observation with changes in insulin regimen; also w/persistent RLL consolidation suggestive of mass  1  Hypoglycemia : Insulin Dependent DM2,   Differential: Dehydration (in custodial house stay, questionable nutrition) vs Medication non-compliance vs Autonomic dysregulation, Cannot r/o insulinoma given her pancreatic CA hx  - s/p PO glucagon by EMS, IVFs continuous w/accuchecks  - Blood glucose: inconsistent accuchecks between L and R hands, will f/u and check each hand one after another; BMP correlated with accucheck reading this am   Continue Acuchecks Q2 changed to q4,   - Will decrease D5NS from 100--> 50ml/hr given last accuchecks WNL although <100  - Received Lantus 15 last night ( pta 30HS); continue SSI  - Held metformin and humalog AC;   - Ordering c-peptide, proinsulin levels  - Possibly dilutional CBC given IVFs (Hb 9 4--> 8 1), will trend    2   Hypothermia, resolved   - initially low temp 93--> 97 s/p light therapy 3hrs  - no UDS on file, will add on given drug abuse history    3  Right Lower Lobe Consolidation:   Persistent s/p PNA 11/20/2016; concerning for mass/neoplastic process; primary given smoking hx vs mets given hx pancreatic ca   - Lactic acid 1 1, WBC 7,  - Currently receiving Zpak and cefepime; will discontinue given no cough, sputum production, SOB, WATSON, O2 requirement, labs, and clinical apperance  --> C/S Pulmonology: Likely will also add-on palliative care follow-up  Patient weighing risk vs benefits of pursuing eval and management, likely will not pursue  Please comment on recommendations for comfort  4  Right Nephrolithiasis :  S/p stent   Acute on chronic;  Pain controlled moderately with tramadol; also taking flomax 0 4 and pyridium  -Follow up outpatient with Urology: s/p uteroscopy w/staghorn calculus +urease 3/1/2017, w/post-op 1 5cm fragments also likely passing fragments per note on 4/19/2017 follow-up  5  Abdominal pain,   A  RUQ: tolerable, stable  Differential Dx: #2 vs #3   - Possible diaphragmatic irritation given mass per #2  B  Lower  Differential diagnosis: Fecal impaction per no BM x3 days & CT results, vs  Cystitis (recently treated with full course outpatient 5-7 days PTA)  ++blood, negative Urine culture 60K enterococcus, No sexual activity or discharge  --> Will f/u after miralax, colace, and enema  Senna given last night  6  Prophylaxis  -GI: PPI qd  -VTE Pharmacologic Prophylaxis: Heparin  -VTE Mechanical Prophylaxis: sequential compression device    7  PMH, stable, chronic  - Continuing home medications or alternatives as follows or as documented in the H & P  -HTN: per H&P patient self d/c metoprolol 25BID and Enalapril 10mg BID  Will ; consider low dose ACE for kidney fxn on discharge    -COPD  -Depression  -GERD  -Chronic pain  -Fatty Liver  -Chronic pneumobilia  -s/p cholecystectomy  -s/p gastrojejunostomy  -S/P right Lacunar CVA (11/2016 while intubated)  -S/p NSTEMI  (11/2016 while intubated)    8  Disposition  - Code: DNR/DNI  - Diet: Heart healthy  - PCP: Dr Selene Leyden , [facility]  - Ambulation: No dysfunction   - Discharge: Home likely tomorrow; pending goals of care discussion      Plan D/W Dr Bryant and [facility] Team  We appreciate the input from the consulting teams and case management  Hospital Course:  59yo female PMHx of Insulin dependent DM2, hx of drug abuse living in half-way house, NSTEMI/CVA s/p intubation for PNA 11/2016, and hx of pancreatic ca s/p whipple 2011  Was brought to [facility] by EMS after she was found unconscious by staff at home  Per EMS, blood glucose was 30s, hypothermic at 93 7, admitted for further evaluation  Subjective:       Patient was seen and examined at bedside  Slept adequately overnight  Feels comfortable and would like to leave  Overnight events: None  ROS:   Patient reports  Last BM 3 days pta , daily urination, and good PO  Positive generalized abd pain, worse RUQ  Patient denies  SOB, CP, N/V, fevers/chills  All other systems were reviewed and are otherwise negative  Objective:       Vitals:   Blood pressure 110/62, pulse 62, temperature 98 °F (36 7 °C), temperature source Oral, resp  rate 18, height 5' 2" (1 575 m), weight 64 8 kg (142 lb 13 7 oz), SpO2 98 %  ,Body mass index is 26 13 kg/(m^2)  Vitals:    04/23/17 1900 04/23/17 2007 04/23/17 2300 04/24/17 0808   BP: 116/70  113/70 110/62   Pulse: 82  74 62   Resp: 20  20 18   Temp: 98 9 °F (37 2 °C)  98 2 °F (36 8 °C) 98 °F (36 7 °C)   TempSrc: Oral  Oral Oral   SpO2: 98% 95% 98% 98%   Weight:       Height:             Intake/Output Summary (Last 24 hours) at 04/24/17 4328  Last data filed at 04/24/17 7905   Gross per 24 hour   Intake          2968 34 ml   Output              950 ml   Net          2018 34 ml       Physical Exam:    Physical Exam   Constitutional: She is oriented to person, place, and time  She appears well-developed and well-nourished  No distress     HENT: Head: Normocephalic  Eyes: Conjunctivae are normal  Right eye exhibits no discharge  Left eye exhibits no discharge  Neck: Normal range of motion  Cardiovascular: Normal rate and regular rhythm  Murmur heard  Pulmonary/Chest: Effort normal  No respiratory distress  She has no wheezes  She has rales  LLL rales   Abdominal: Bowel sounds are normal  She exhibits distension and mass  There is tenderness  There is no rebound  Areas of firmness consistent with constipation, worse tenderness to palpation at RUQ & mild-moderate at suprapubic to firm palpation  + CVA tenderness on R   Musculoskeletal: Normal range of motion  She exhibits no edema or tenderness  Neurological: She is alert and oriented to person, place, and time  She exhibits normal muscle tone  Skin: Skin is warm  She is not diaphoretic  Psychiatric: She has a normal mood and affect         Invasive Devices     Central Venous Catheter Line            Port A Cath 04/23/17 Left Chest 1 day                           Medications:  Current Facility-Administered Medications   Medication Dose Route Frequency    acetaminophen (TYLENOL) tablet 650 mg  650 mg Oral Q6H PRN    albuterol (PROVENTIL HFA,VENTOLIN HFA) inhaler 2 puff  2 puff Inhalation Q4H PRN    amLODIPine (NORVASC) tablet 10 mg  10 mg Oral Daily    azithromycin (ZITHROMAX) 250 mg in sodium chloride 0 9 % 250 mL IVPB  250 mg Intravenous Q24H    cefepime (MAXIPIME) 2,000 mg in dextrose 5 % 50 mL IVPB  2,000 mg Intravenous Q24H    dextrose 5 % and sodium chloride 0 9 % infusion  100 mL/hr Intravenous Continuous    dicyclomine (BENTYL) capsule 10 mg  10 mg Oral 4x Daily (AC & HS)    FLUoxetine (PROzac) capsule 40 mg  40 mg Oral Daily    gabapentin (NEURONTIN) capsule 800 mg  800 mg Oral TID    heparin (porcine) subcutaneous injection 5,000 Units  5,000 Units Subcutaneous Q8H [facility]    insulin glargine (LANTUS) subcutaneous injection 15 Units  15 Units Subcutaneous HS    insulin lispro (HumaLOG) 100 units/mL subcutaneous injection 1-5 Units  1-5 Units Subcutaneous 4 times day    ondansetron (ZOFRAN) injection 4 mg  4 mg Intravenous Q6H PRN    pancrelipase (Lip-Prot-Amyl) (CREON) delayed release capsule 6,000 Units  6,000 Units Oral TID With Meals    pantoprazole (PROTONIX) EC tablet 20 mg  20 mg Oral Early Morning    phenazopyridine (PYRIDIUM) tablet 100 mg  100 mg Oral TID With Meals    senna (SENOKOT) tablet 8 6 mg  1 tablet Oral Daily    tamsulosin (FLOMAX) capsule 0 4 mg  0 4 mg Oral Daily With Dinner    traMADol (ULTRAM) tablet 50 mg  50 mg Oral Q6H PRN    traZODone (DESYREL) tablet 50 mg  50 mg Oral HS       Lab and other studies:  I have personally reviewed pertinent reports       Admission on 04/23/2017   Component Date Value    POC Glucose 04/23/2017 121     WBC 04/23/2017 7 65     RBC 04/23/2017 3 39*    Hemoglobin 04/23/2017 9 4*    Hematocrit 04/23/2017 28 0*    MCV 04/23/2017 83     MCH 04/23/2017 27 7     MCHC 04/23/2017 33 6     RDW 04/23/2017 17 4*    MPV 04/23/2017 10 4     Platelets 78/08/1053 162     nRBC 04/23/2017 0     Neutrophils Relative 04/23/2017 83*    Lymphocytes Relative 04/23/2017 12*    Monocytes Relative 04/23/2017 5     Eosinophils Relative 04/23/2017 0     Basophils Relative 04/23/2017 0     Neutrophils Absolute 04/23/2017 6 26     Lymphocytes Absolute 04/23/2017 0 95     Monocytes Absolute 04/23/2017 0 39     Eosinophils Absolute 04/23/2017 0 01     Basophils Absolute 04/23/2017 0 01     LACTIC ACID 04/23/2017 1 1     Sodium 04/23/2017 141     Potassium 04/23/2017 4 3     Chloride 04/23/2017 109*    CO2 04/23/2017 21     Anion Gap 04/23/2017 11     BUN 04/23/2017 14     Creatinine 04/23/2017 0 81     Glucose 04/23/2017 145*    Calcium 04/23/2017 6 6*    AST 04/23/2017 18     ALT 04/23/2017 17     Alkaline Phosphatase 04/23/2017 145*    Total Protein 04/23/2017 6 4     Albumin 04/23/2017 2 2*    Total Bilirubin 04/23/2017 0 24     eGFR 04/23/2017 >60 0     Lipase 04/23/2017 32*    Protime 04/23/2017 13 5     INR 04/23/2017 1 02     PTT 04/23/2017 75*    Ventricular Rate 04/23/2017 69     Atrial Rate 04/23/2017 69     ID Interval 04/23/2017 184     QRSD Interval 04/23/2017 74     QT Interval 04/23/2017 404     QTC Interval 04/23/2017 432     P Axis 04/23/2017 78     QRS Axis 04/23/2017 95     T Wave Axis 04/23/2017 83     Color, UA 04/23/2017 yellow     Color, UA 04/23/2017 Yellow     Clarity, UA 04/23/2017 Clear     pH, UA 04/23/2017 5 5     Leukocytes, UA 04/23/2017 Moderate*    Nitrite, UA 04/23/2017 Positive*    Protein, UA 04/23/2017 Negative     Glucose, UA 04/23/2017 Negative     Ketones, UA 04/23/2017 Negative     Urobilinogen, UA 04/23/2017 0 2     Bilirubin, UA 04/23/2017 Negative     Blood, UA 04/23/2017 Moderate*    Specific Gravity, UA 04/23/2017 1 015     RBC, UA 04/23/2017 None Seen     WBC, UA 04/23/2017 Innumerable*    Epithelial Cells 04/23/2017 Occasional     Bacteria, UA 04/23/2017 Moderate*    OTHER OBSERVATIONS 04/23/2017 Yeast Cells Present     Urine Culture 04/23/2017 20,000-29,000 cfu/ml Gram Negative Frantz Enteric Like     Urine Culture 04/23/2017 60,000-69,000 cfu/ml Enterococcus species     POC Glucose 04/23/2017 21*    POC Glucose 04/23/2017 209*    POC Glucose 04/23/2017 488*    POC Glucose 04/23/2017 302*    POC Glucose 04/23/2017 >500*    POC Glucose 04/23/2017 295*    POC Glucose 04/23/2017 274*    POC Glucose 04/23/2017 262*    POC Glucose 04/23/2017 100     POC Glucose 04/23/2017 58*    POC Glucose 04/23/2017 140     POC Glucose 04/23/2017 160*    POC Glucose 04/23/2017 146*    POC Glucose 04/23/2017 401*    POC Glucose 04/23/2017 190*    POC Glucose 04/23/2017 172*    POC Glucose 04/24/2017 116     POC Glucose 04/24/2017 71     POC Glucose 04/24/2017 60*    POC Glucose 04/24/2017 176*    Sodium 04/24/2017 142     Potassium 04/24/2017 3 5     Chloride 04/24/2017 110*    CO2 04/24/2017 23     Anion Gap 04/24/2017 9     BUN 04/24/2017 10     Creatinine 04/24/2017 0 72     Glucose 04/24/2017 80     Calcium 04/24/2017 7 4*    eGFR 04/24/2017 >60 0     WBC 04/24/2017 3 29*    RBC 04/24/2017 2 94*    Hemoglobin 04/24/2017 8 1*    Hematocrit 04/24/2017 24 6*    MCV 04/24/2017 84     MCH 04/24/2017 27 6     MCHC 04/24/2017 32 9     RDW 04/24/2017 17 9*    Platelets 22/88/9576 130*    MPV 04/24/2017 10 8     POC Glucose 04/24/2017 75     POC Glucose 04/24/2017 96     POC Glucose 04/24/2017 90     POC Glucose 04/24/2017 75     POC Glucose 04/24/2017 73        Recent Results (from the past 24 hour(s))   Fingerstick Glucose (POCT)    Collection Time: 04/23/17 10:14 AM   Result Value Ref Range    POC Glucose 21 (LL) 65 - 140 mg/dl   Fingerstick Glucose (POCT)    Collection Time: 04/23/17 10:33 AM   Result Value Ref Range    POC Glucose 209 (H) 65 - 140 mg/dl   Fingerstick Glucose (POCT)    Collection Time: 04/23/17 11:49 AM   Result Value Ref Range    POC Glucose 488 (H) 65 - 140 mg/dl   Fingerstick Glucose (POCT)    Collection Time: 04/23/17  1:05 PM   Result Value Ref Range    POC Glucose 302 (H) 65 - 140 mg/dl   Fingerstick Glucose (POCT)    Collection Time: 04/23/17  2:00 PM   Result Value Ref Range    POC Glucose >500 (HH) 65 - 140 mg/dl   Fingerstick Glucose (POCT)    Collection Time: 04/23/17  2:02 PM   Result Value Ref Range    POC Glucose 295 (H) 65 - 140 mg/dl   Fingerstick Glucose (POCT)    Collection Time: 04/23/17  2:04 PM   Result Value Ref Range    POC Glucose 274 (H) 65 - 140 mg/dl   Fingerstick Glucose (POCT)    Collection Time: 04/23/17  3:00 PM   Result Value Ref Range    POC Glucose 262 (H) 65 - 140 mg/dl   Fingerstick Glucose (POCT)    Collection Time: 04/23/17  5:02 PM   Result Value Ref Range    POC Glucose 100 65 - 140 mg/dl   Fingerstick Glucose (POCT)    Collection Time: 04/23/17  5:44 PM   Result Value Ref Range    POC Glucose 58 (L) 65 - 140 mg/dl   Fingerstick Glucose (POCT)    Collection Time: 04/23/17  5:46 PM   Result Value Ref Range    POC Glucose 140 65 - 140 mg/dl   Fingerstick Glucose (POCT)    Collection Time: 04/23/17  6:55 PM   Result Value Ref Range    POC Glucose 160 (H) 65 - 140 mg/dl   Fingerstick Glucose (POCT)    Collection Time: 04/23/17  8:01 PM   Result Value Ref Range    POC Glucose 146 (H) 65 - 140 mg/dl   Fingerstick Glucose (POCT)    Collection Time: 04/23/17  9:21 PM   Result Value Ref Range    POC Glucose 401 (H) 65 - 140 mg/dl   Fingerstick Glucose (POCT)    Collection Time: 04/23/17 10:32 PM   Result Value Ref Range    POC Glucose 190 (H) 65 - 140 mg/dl   Fingerstick Glucose (POCT)    Collection Time: 04/23/17 11:12 PM   Result Value Ref Range    POC Glucose 172 (H) 65 - 140 mg/dl   Fingerstick Glucose (POCT)    Collection Time: 04/24/17 12:20 AM   Result Value Ref Range    POC Glucose 116 65 - 140 mg/dl   Fingerstick Glucose (POCT)    Collection Time: 04/24/17  1:32 AM   Result Value Ref Range    POC Glucose 71 65 - 140 mg/dl   Fingerstick Glucose (POCT)    Collection Time: 04/24/17  2:27 AM   Result Value Ref Range    POC Glucose 60 (L) 65 - 140 mg/dl   Fingerstick Glucose (POCT)    Collection Time: 04/24/17  2:59 AM   Result Value Ref Range    POC Glucose 176 (H) 65 - 140 mg/dl   Fingerstick Glucose (POCT)    Collection Time: 04/24/17  4:12 AM   Result Value Ref Range    POC Glucose 75 65 - 140 mg/dl   Fingerstick Glucose (POCT)    Collection Time: 04/24/17  4:32 AM   Result Value Ref Range    POC Glucose 96 65 - 140 mg/dl   Basic metabolic panel    Collection Time: 04/24/17  5:36 AM   Result Value Ref Range    Sodium 142 136 - 145 mmol/L    Potassium 3 5 3 5 - 5 3 mmol/L    Chloride 110 (H) 100 - 108 mmol/L    CO2 23 21 - 32 mmol/L    Anion Gap 9 4 - 13 mmol/L    BUN 10 5 - 25 mg/dL    Creatinine 0 72 0 60 - 1 30 mg/dL    Glucose 80 65 - 140 mg/dL    Calcium 7 4 (L) 8 3 - 10 1 mg/dL eGFR >60 0 ml/min/1 73sq m   CBC (With Platelets)    Collection Time: 04/24/17  5:36 AM   Result Value Ref Range    WBC 3 29 (L) 4 31 - 10 16 Thousand/uL    RBC 2 94 (L) 3 81 - 5 12 Million/uL    Hemoglobin 8 1 (L) 11 5 - 15 4 g/dL    Hematocrit 24 6 (L) 34 8 - 46 1 %    MCV 84 82 - 98 fL    MCH 27 6 26 8 - 34 3 pg    MCHC 32 9 31 4 - 37 4 g/dL    RDW 17 9 (H) 11 6 - 15 1 %    Platelets 923 (L) 744 - 390 Thousands/uL    MPV 10 8 8 9 - 12 7 fL   Fingerstick Glucose (POCT)    Collection Time: 04/24/17  5:36 AM   Result Value Ref Range    POC Glucose 90 65 - 140 mg/dl   Fingerstick Glucose (POCT)    Collection Time: 04/24/17  6:12 AM   Result Value Ref Range    POC Glucose 73 65 - 140 mg/dl   Fingerstick Glucose (POCT)    Collection Time: 04/24/17  7:11 AM   Result Value Ref Range    POC Glucose 75 65 - 140 mg/dl     Blood Culture:   Lab Results   Component Value Date    BLOODCX No Growth After 5 Days   11/12/2016   ,   Urinalysis: Lab Results   Component Value Date    COLORU Yellow 04/23/2017    CLARITYU Clear 04/23/2017    SPECGRAV 1 015 04/23/2017    PHUR 5 5 04/23/2017    LEUKOCYTESUR Moderate (A) 04/23/2017    NITRITE Positive (A) 04/23/2017    PROTEINUA Negative 04/23/2017    GLUCOSEU Negative 04/23/2017    KETONESU Negative 04/23/2017    BILIRUBINUR Negative 04/23/2017    BLOODU Moderate (A) 04/23/2017   ,   Urine Culture:   Lab Results   Component Value Date    URINECX  04/23/2017     20,000-29,000 cfu/ml Gram Negative Frantz Enteric Like    URINECX 60,000-69,000 cfu/ml Enterococcus species 04/23/2017   ,   Wound Culure: No results found for: [facility]       Results from last 7 days  Lab Units 04/24/17  0536 04/23/17  0650 04/17/17  1106   SODIUM mmol/L 142 141 139   POTASSIUM mmol/L 3 5 4 3 4 1   CHLORIDE mmol/L 110* 109* 107   CO2 mmol/L 23 21 22   ANION GAP mmol/L 9 11 10   BUN mg/dL 10 14 13   CREATININE mg/dL 0 72 0 81 1 28   EGFR ml/min/1 73sq m >60 0 >60 0 42 1   GLUCOSE RANDOM mg/dL 80 145* 71   CALCIUM mg/dL 7 4* 6 6* 8 3   AST U/L  --  18 18   ALT U/L  --  17 17   ALK PHOS U/L  --  145* 173*   TOTAL PROTEIN g/dL  --  6 4 6 8   ALBUMIN g/dL  --  2 2* 2 3*   BILIRUBIN TOTAL mg/dL  --  0 24 0 10*       Results from last 7 days  Lab Units 04/24/17  0536 04/23/17  0650 04/17/17  1106   WBC Thousand/uL 3 29* 7 65 4 55   HEMOGLOBIN g/dL 8 1* 9 4* 9 5*   PLATELETS Thousands/uL 130* 162 151       Results from last 7 days  Lab Units 04/23/17  0738 04/19/17  2128 04/17/17  1104   URINE CULTURE  20,000-29,000 cfu/ml Gram Negative Frantz Enteric Like  60,000-69,000 cfu/ml Enterococcus species <10,000 cfu/ml Mixed Contaminants X2 70,000-79,000 cfu/ml Mixed Contaminants X5       Imaging:   Per Above                Monico Acosta MD PGY-1   Janine Max DO, PGY-2  Family Medicine

## 2017-04-24 NOTE — PROGRESS NOTES
Spoke with Dr Devon David  Pt is no longer required to be on every 2 hour blood sugar checks  She is ok to go back to prior to breakfast and 2 hours after meals  Telemetry also shows occasional PVCs  Dr Devon David aware  Calcium and Postassium replacement ordered

## 2017-04-24 NOTE — CASE MANAGEMENT
Initial Clinical Review    Admission: Date/Time/Statement: 4/23/17 @ 0849     Orders Placed This Encounter   Procedures    Inpatient Admission (expected length of stay for this patient is greater than two midnights)     Standing Status:   Standing     Number of Occurrences:   1     Order Specific Question:   Admitting Physician     Answer:   DACIA ROBBINS [[id number]]     Order Specific Question:   Level of Care     Answer:   Med Surg [[id number]]     Order Specific Question:   Estimated length of stay     Answer:   More than 2 Midnights     Order Specific Question:   Certification     Answer:   I certify that inpatient services are medically necessary for this patient for a duration of greater than two midnights  See H&P and MD Progress Notes for additional information about the patient's course of treatment  ED: Date/Time/Mode of Arrival:   ED Arrival Information     Expected Arrival Acuity Means of Arrival Escorted By Service Admission Type    - 4/23/2017 04:56 Urgent Ambulance [facility] EMS General Medicine Urgent    Arrival Complaint    Hypoglycemic          Chief Complaint:   Chief Complaint   Patient presents with    Hypoglycemia - Symptomatic     pt fell oob onto floor sttriking face on floor pt foound to have blood sugar of 32- glucogan given pre-hospita  sugar increased to 58       History of Illness: [de-identified]  60 y/o F with a significant pmhx of 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 secondary to IV drugs use, diabetes type 2, hypertension, hyperlipidemia, depression with insomnia, tobacco use, GERD  Patient also with a history of recent intubation required secondary to a complex pneumonia in November 2016  Patient also had a STEMI during her stay and isn't following up with cardiology       The patient presents today via EMS secondary to her supervisor finding her with an abrasion over her nose and on the floor  At this time we have an unknown mechanism of fall  They called the ambulance secondary to hypoglycemia finding her sugars being in the 30s  The patient only remembers from the ambulance ride to the hospital prior to this she has no recollection of what happened  She stated that this morning she woke up took her morning medications and felt fine, and then woke up in an ambulance  She denies any cough, shortness of breath, chest pain, fevers, GI  She does state that she continues to have right lower quadrant pain secondary to a kidney stone that is being monitored by urology  S/p stent in March 17  The patient is a very poor historian stating that she was started on azithromycin on Friday by her PCP/ or Urology started on for possible pneumonia  She was also started on trazodone and discontinued off the mirtazapine for her uncontrolled insomnia at that visit   The patient is currently on Keflex unknown reason she said that her urologist gave it to her 2/2 kidney stones         ED Vital Signs:   ED Triage Vitals   Temperature Pulse Respirations Blood Pressure SpO2   04/23/17 0502 04/23/17 0502 04/23/17 0502 04/23/17 0502 04/23/17 0502   93 °F (33 9 °C) 74 16 167/76 100 %      Temp Source Heart Rate Source Patient Position BP Location FiO2 (%)   04/23/17 0718 04/23/17 0638 04/23/17 0832 04/23/17 0832 --   Oral Monitor Lying Right arm       Pain Score       04/23/17 0638       No Pain        Wt Readings from Last 1 Encounters:   04/23/17 64 8 kg (142 lb 13 7 oz)       Vital Signs (abnormal):              04/23 0701  04/24 0700 04/24 0701  04/24 1139    Most Recent          Temperature (°F) 97 4-99 2 98  98 (36 7)        Pulse 72-88 62  62        Respirations 16-20 18  18        Blood Pressure 113//68 110/62  110/62        SpO2 (%) 95-98 98  98       Abnormal Labs/Diagnostic Test Results:   Lab Units 04/24/17  0536 04/23/17  0650 04/17/17  1106   SODIUM mmol/L 142 141 139   POTASSIUM mmol/L 3 5 4 3 4 1   CHLORIDE mmol/L 110* 109* 107   CO2 mmol/L 23 21 22   ANION GAP mmol/L 9 11 10   BUN mg/dL 10 14 13   CREATININE mg/dL 0 72 0 81 1 28   EGFR ml/min/1 73sq m >60 0 >60 0 42 1   GLUCOSE RANDOM mg/dL 80 145* 71   CALCIUM mg/dL 7 4* 6 6* 8 3   AST U/L --  18 18   ALT U/L --  17 17   ALK PHOS U/L --  145* 173*   TOTAL PROTEIN g/dL --  6 4 6 8   ALBUMIN g/dL --  2 2* 2 3*   BILIRUBIN TOTAL mg/dL --  0 24 0 10*         Lab Units 04/24/17  0536 04/23/17  0650 04/17/17  1106   WBC Thousand/uL 3 29* 7 65 4 55   HEMOGLOBIN g/dL 8 1* 9 4* 9 5*   PLATELETS Thousands/uL 130* 162 151        Lab Units 04/23/17  0738 04/19/17  2128 04/17/17  1104   URINE CULTURE   20,000-29,000 cfu/ml Gram Negative Frantz Enteric Like  60,000-69,000 cfu/ml Enterococcus species <10,000 cfu/ml Mixed Contaminants X2 70,000-79,000 cfu/ml Mixed Contaminants X5      CT Chest  - Persistent right lower lobe consolidation that is improved since the prior study of 2/22/2017  Daniel Esters, the persistence since 2/22/2017 still raises the possibility of other process such as chronic eosinophilic pneumonia or even possibility of neoplastic process   Bronchoscopy should be considered for further evaluation  CXR  - Posterior right lung base pneumonia, persistent at least as far back as February 22, 2017   The possibility of developing endobronchial right lower lobe mass resulting in obstructive pneumonia must be considered  Ct  Abd & Pelvis - 1   Stable postoperative changes, following Whipple procedure  2   Hepatic steatosis  3   1 5 cm right lower pole renal calculus  4   No evidence of acute abnormality in the abdomen or pelvis  5   Right lower lobe pulmonary consolidation, most likely pneumonia    CT Head - no acute         ED Treatment:   Medication Administration from 04/23/2017 0455 to 04/23/2017 1110       Date/Time Order Dose Route Action Action by Comments     04/23/2017 0600 sodium chloride 0 9 % bolus 1,000 mL 1,000 mL Intravenous New Bag Namita Rhodes RN warm fluids running     04/23/2017 0724 tetanus-diphtheria-acellular pertussis (BOOSTRIX) IM injection 0 5 mL 0 5 mL Intramuscular Given Bela Holloway RN      04/23/2017 8500 cefepime (MAXIPIME) 2 g/50 mL dextrose IVPB 2,000 mg Intravenous New Bag Loyda Salguero RN      04/23/2017 8399 vancomycin (VANCOCIN) 1,250 mg in sodium chloride 0 9 % 250 mL IVPB 1,250 mg Intravenous New Bag Loyda Salguero RN      04/23/2017 1018 dextrose 50 % IV solution **AcuDose Override Pull** 50 mL  Given Loyda Salguero RN         Past Medical/Surgical History:     Past Medical History:   Diagnosis Date    Cancer     Chronic low back pain     COPD (chronic obstructive pulmonary disease)     CVA (cerebral vascular accident) nov 2017    Depression     Diabetes mellitus     DVT (deep venous thrombosis)     GERD (gastroesophageal reflux disease)     Herniated lumbar intervertebral disc     Hx of hepatitis C     Hypertension     Insomnia     Kidney stones     Myocardial infarction 11/2015    Nephrolithiasis     NSTEMI (non-ST elevated myocardial infarction)     Pancreatic cancer 2011    Pneumonia 11/2015    Pulmonary nodule        Admitting Diagnosis: Sepsis [A41 9]  Diabetes [E11 9]  UTI (urinary tract infection) [N39 0]  Pneumonia [J18 9]  Hypoglycemia [E16 2]    Age/Sex: 59 y o  female    Assessment/Plan:    1  Hypothermia: Multifactorial likely 2/2 UTI &RLL PNA & Hypoglycemia  -> Improving: Will continue to monitor with Vital q4 hrs checks  -> 93 F rectal on arrival, requiring light therapy then increased to 97 degrees  -> Per ED residents they follows Sirs pathway given only her hypoglycemia episode  Her blood work was WNL Lactic 1 1, WBC 7 65  1 dose of Vancomycin, and Cefepime given in the ED    -> BC pending     2  CAP RLL:  -> Recent ICU admission in 11/16 Intubation required for complex PNA  ->Patient denies fevers, chills, coughs  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  States that she was started on azithromycin on Friday 2/2 Lung exam findings  No documentation found at PCP  She only had 1 dose of 500mg and was to continue 250 mg daily x4 days   -> Restart Azithryomycin 250mg IV daily, and Cefpimine 2g q24  -> Incentive spirometer at bedside   3  Complex UTI with Right Renal Calculus:  ->Patient asymptomatic given in the setting of taking pyridium TID given by her PCP on Friday  Unclear if Keflex was started previously  Recently complaining about RLL pain o/p and was to follow up with nephro  ->Start tomorrow Cefpimine 2g q24 hrs for treatment of complex UTI  1 dose given in ED today, Continue on Flomax 0 4mg HS    -> CT: (+) 1 5 cm right lower pole renal calculus/S/p 3/17 renal stent placement    -> Pain Control: Continue Dysuria: Pyridum TID, Tramadol 50 mgs   -> UC pending     4  Hypoglycemia: unknown likely 2/2 the setting of multiple infections    -> A1c 9 0%  -> DM type 2 complicated with neuropathy  -> EMS: BS low 30s, unable to get IV accsess, patient was given oral glucagon  BS then raised to 53  Once patient arrive to ED BS were 120   -> Monitor BS check with meals  -> Start on SSI #3, Will restart Lantus 20 HS ( decrease from 30 units), Restart Humalog 10 units tomorrow with meals once patient tolerates PO  Held: Metformin patient elected to discontinue herself  Diabetic Neuropathy: Gabapentin 800 mg TID     5  HTN with history of CVA: Above baseline at /80s   -> Continue on Amlodipine  Patient states that she elected to stop taking Enalapril, and Lopressor on her own for a month    -> Will monitor BP, if elevated will consider restarting Enalapril  -> Hx of NSTEMI: During ICU stay on 11/16  [address] o/p     6  HLD: Stable on Atorvastatin 40 mg daily     7  Depression with insomina:   -> Recently started on Trazodone 50 mg bedtime to help aid for insomnia  Recently d/c Mirtazapine o/p  -> Depression controlled on FLuoxentine     8  Hx of drug abuse in the past  -> Currently living at [facility]  Been sober for >10 years    -> Avoid narcotics     9  Hep C: Being follow by PCP     10  Hx of pancreatic cancer s/p Whipple procedure: Patient has been in remission since 2011  She continues to have a port that was placed in [place]  She states of port has not been removed given concern of relapse     11  COPD: Known history, no PRN meds at home  Will likely need to follow up with PCP  At this time controlled and no signs of COPD exacerbation       DVT ppx :heparin  Tobacco Use: Deferred Nicotine patch  Code Status: DNR/ DNI patient was very firm on this decision  Diet: Cardiac, IVF NS @100ml/hr    Admission Orders:  Scheduled Meds:   amLODIPine 10 mg Oral Daily   azithromycin 250 mg Intravenous Q24H   cefepime 2,000 mg Intravenous Q24H   dicyclomine 10 mg Oral 4x Daily (AC & HS)   docusate sodium 100 mg Oral BID   FLUoxetine 40 mg Oral Daily   gabapentin 800 mg Oral TID   heparin (porcine) 5,000 Units Subcutaneous Q8H [facility]   insulin glargine 15 Units Subcutaneous HS   insulin lispro 1-5 Units Subcutaneous 4 times day   pancrelipase (Lip-Prot-Amyl) 6,000 Units Oral TID With Meals   pantoprazole 20 mg Oral Early Morning   phenazopyridine 100 mg Oral TID With Meals   polyethylene glycol 17 g Oral Daily   senna 1 tablet Oral Daily   tamsulosin 0 4 mg Oral Daily With Dinner   traZODone 50 mg Oral HS     Continuous Infusions:   dextrose 5 % and sodium chloride 0 9 % 50 mL/hr Started on 4/24 @ 05:29   NSS  100 ml/hr  D/c'd in 4/24 @ 05:29     PRN Meds:   acetaminophen    albuterol    ondansetron    sodium phosphate-biphosphate    traMADol po 4/23 x 2 - 4/24 x 1     Nursing Orders  - VS q 4- Diet Cardiac  Step 1 - cons carb  k cals  - up and OOB as tolerated  - I & O q shift       Central Utilization Review Department - Support Staff  Phone:  [phone]  Fax: [phone]    Contact Name Title Assignment [place]     Opt  1 Amina GEE Liaison Denials - Medical Necessity All     Opt  3 Ex  [address]  Patients last name A-M [address]  3 Ex   [address]  Patients last name N-Z Homero

## 2017-04-24 NOTE — CONSULTS
Pulmonary Consultation   Yunior Hurst 59 y o  female MRN: [id number]  Unit/Bed#: [facility] 610-01 Encounter: [id number]      Reason for consultation: Pneumonia    Requesting physician: Fang Escobar MD    Impressions:   1  Abnormal CTChest with RLL opacity - improved from February 2017 scan  2  Ongoing tobacco abuse - r/o COPD    Recommendations:  1  CTChest shows persistent RLL opacity, although it is better than previous scan in February  Pt is currently refusing bronchoscopy  She would be agreeable to undergo repeat CTChest in 6-8 weeks to see if opacity continues to improve  She is aware that if still present she would need to undergo bronchoscopy at that time  She has a history of pancreatic cancer for which she underwent Whipple Procedure years ago  She would not be agreeable to any further surgical interventions if they were recommended  2   Start Symbicort and continue this on discharge  3  Outpatient PFTs and pulmonary follow up after CT is complete  All prescriptions have been placed in [employer] and our office will call her to schedule  4  Smoking cessation  5  Stable from a pulmonary standpoint for discharge  Discussed with RN    History of Present Illness   HPI:  Yunior Hurst is a 59 y o  female who has a significant past medical history notable for ARDS, Sepsis, CAP back in November of 2016  At that time, she was admitted to our [address] [place] [address] and required ICU admission and intubation  She underwent bronchoscopy during that admission and all cultures were negative as were blood cultures  Urine Culture did grow both Klebsiella and E  Coli  She completely recovered from that admission and was able to be discharged back to [facility] without any supplemental oxygen  Unfortunately, she never had any pulmonary follow up since that admission    She underwent a repeat CT of the Chest 2/22/17 - she is unsure why and reason for exam reads follow up right paratracheal lymph nodes   This scan showed near complete resolution of diffuse airspace disease as well as RLL opacity thought to be a pneumonia  There was also a 2mm RUL nodule  She states that she was treated with antibiotics, but unsure what kind as she was off and on keflex due to issues with kidney stones  She underwent lithotripsy and stent on 3/17/17 and was again given keflex, which she tells me was switched "at some point" to azithromycin  Yesterday, she was found by her supervisor on the floor with an abrasion on her nose  She was hypoglycemic with blood sugar in the 30s  911 was called and she was brought to the ER for further evaluation  CT of the Chest was again done and for this reason pulmonary consult was requested  She was also found to be hypothermic  She has ongoing right flank pain due to her kidney stones  She otherwise had no pulmonary symptoms  She denies chest pain, shortness of breath, dyspnea on exertion, fevers, chills, cough, sputum production, weight changes or significant bronchospasm  She continues to smoke but has cut down from a pack a day to 3 cigarettes a day  She states that she would not want any further surgical procedures as she has a remote history of Whipple for pancreatic CA  She currently is not agreeable to undergo bronchoscopy  Review of systems:  12 point review of systems was completed and was otherwise negative except as listed in HPI        Historical Information   Past Medical History:   Diagnosis Date    Cancer     pancreatic    Chronic low back pain     COPD (chronic obstructive pulmonary disease)     CVA (cerebral vascular accident) nov 2017    very minimal left leg residual, difficulty lifting leg    Depression     Diabetes mellitus     type II    DVT (deep venous thrombosis)     GERD (gastroesophageal reflux disease)     Herniated lumbar intervertebral disc     Hx of hepatitis C     Hypertension     Insomnia     Kidney stones     Myocardial infarction 11/2015    Nephrolithiasis     2015, 2016    NSTEMI (non-ST elevated myocardial infarction)     Pancreatic cancer 2011    Pneumonia 11/2015    CAP    Pulmonary nodule      Past Surgical History:   Procedure Laterality Date    APPENDECTOMY      CYST REMOVAL Right     ovary, age 13    HYSTERECTOMY      KNEE SURGERY Right 2005    arthroscopy    PANCREATICODUODENECTOMY      WY CYSTO/URETERO W/LITHOTRIPSY &INDWELL STENT INSRT Right 3/1/2017    Procedure: CYSTOSCOPY; URETEROSCOPY; HOLMIUM LASER LITHOTRIPSY; BASKET STONE EXTRACTION; RETROGRADE PYELOGRAM; STENT INSERTION ;  Surgeon: Maria Antonia Lara MD;  [facility];  Service: Urology    WHIPPLE PROCEDURE W/ LAPAROSCOPY       Family History   Problem Relation Age of Onset    Hypertension Mother     Diabetes Father     No Known Problems Sister     Diabetes Maternal Grandmother     Diabetes Paternal Grandmother     Lupus Sister        Occupational history: No known occupational exposures - currently a resident of [facility]    Tobacco history: Currently smoking 3 cigarettes a day    Smoked 1PPD for 50 years    Meds/Allergies   Current Facility-Administered Medications   Medication Dose Route Frequency    acetaminophen (TYLENOL) tablet 650 mg  650 mg Oral Q6H PRN    albuterol (PROVENTIL HFA,VENTOLIN HFA) inhaler 2 puff  2 puff Inhalation Q4H PRN    amLODIPine (NORVASC) tablet 10 mg  10 mg Oral Daily    dextrose 5 % and sodium chloride 0 9 % infusion  50 mL/hr Intravenous Continuous    dicyclomine (BENTYL) capsule 10 mg  10 mg Oral 4x Daily (AC & HS)    docusate sodium (COLACE) capsule 100 mg  100 mg Oral BID    FLUoxetine (PROzac) capsule 40 mg  40 mg Oral Daily    gabapentin (NEURONTIN) capsule 800 mg  800 mg Oral TID    heparin (porcine) subcutaneous injection 5,000 Units  5,000 Units Subcutaneous Q8H [facility]    insulin glargine (LANTUS) subcutaneous injection 15 Units  15 Units Subcutaneous HS    insulin lispro (HumaLOG) 100 units/mL subcutaneous injection 1-5 Units  1-5 Units Subcutaneous 4 times day    ondansetron (ZOFRAN) injection 4 mg  4 mg Intravenous Q6H PRN    pancrelipase (Lip-Prot-Amyl) (CREON) delayed release capsule 6,000 Units  6,000 Units Oral TID With Meals    pantoprazole (PROTONIX) EC tablet 20 mg  20 mg Oral Early Morning    phenazopyridine (PYRIDIUM) tablet 100 mg  100 mg Oral TID With Meals    polyethylene glycol (MIRALAX) packet 17 g  17 g Oral Daily    senna (SENOKOT) tablet 8 6 mg  1 tablet Oral Daily    sodium phosphate-biphosphate (FLEET) enema 1 enema  1 enema Rectal Daily PRN    tamsulosin (FLOMAX) capsule 0 4 mg  0 4 mg Oral Daily With Dinner    traMADol (ULTRAM) tablet 50 mg  50 mg Oral Q6H PRN    traZODone (DESYREL) tablet 50 mg  50 mg Oral HS     Prescriptions Prior to Admission   Medication    amLODIPine (NORVASC) 5 mg tablet    ammonium lactate (LAC-HYDRIN) 12 % lotion    aspirin 81 MG tablet    azithromycin (ZITHROMAX) 250 mg tablet    dicyclomine (BENTYL) 10 mg capsule    furosemide (LASIX) 20 mg tablet    gabapentin (NEURONTIN) 800 mg tablet    insulin glargine (LANTUS) 100 units/mL subcutaneous injection    Insulin Lispro (HUMALOG) 100 UNIT/ML SOCT    mirtazapine (REMERON) 7 5 MG tablet    omeprazole (PriLOSEC) 20 mg delayed release capsule    pancrelipase, Lip-Prot-Amyl, (CREON) 6,000 units delayed release capsule    tamsulosin (FLOMAX) 0 4 mg    cephalexin (KEFLEX) 500 mg capsule    FLUoxetine HCl (PROZAC PO)    ibuprofen (MOTRIN) 600 mg tablet    magnesium oxide (MAG-OX) 400 mg    metoprolol tartrate (LOPRESSOR) 25 mg tablet    naproxen (NAPROSYN) 500 mg tablet    pantoprazole (PROTONIX) 40 mg tablet    potassium chloride (K-DUR,KLOR-CON) 20 mEq tablet    Sennosides (SENNA) 8 8 mg/5 mL oral syrup     Allergies   Allergen Reactions    Ciprofloxacin Rash       Vitals: Blood pressure 132/68, pulse 78, temperature 98 1 °F (36 7 °C), temperature source Oral, resp   rate 20, height 5' 2" (1 575 m), weight 64 8 kg (142 lb 13 7 oz), SpO2 98 % , RA, Body mass index is 26 13 kg/(m^2)  Intake/Output Summary (Last 24 hours) at 04/24/17 1547  Last data filed at 04/24/17 1417   Gross per 24 hour   Intake          3283 34 ml   Output              950 ml   Net          2333 34 ml       Physical exam:    General Appearance:    Alert, cooperative, no conversational dyspnea or accessory     muscle use       Head/eyes:    Normocephalic, without obvious abnormality, atraumatic,         PERRL, extraocular muscles intact, no scleral icterus    Nose:   Nares normal, septum midline, mucosa normal, no drainage    or sinus tenderness   Throat:   Moist mucous membranes, no thrush   Neck:   Supple, trachea midline, no adenopathy; no carotid    bruit or JVD   Lungs:     Decreased breath sounds throughout bilaterally with few scattered expiratory wheezes throughout right lung  No rales or rhonchi noted  Chest Wall:    No tenderness or deformity    Heart:    Regular rate and rhythm, S1 and S2 normal, no murmur, rub   or gallop   Abdomen:     Obese, soft, non-tender, bowel sounds active all four quadrants, no masses, no organomegaly   Extremities:   Extremities normal, atraumatic, no cyanosis or edema   Skin:   Warm, dry, turgor normal, no rashes or lesions   Lymph nodes:   Cervical and supraclavicular nodes normal   Neurologic:   CNII-XII intact, normal strength, non-focal         Labs: I have personally reviewed pertinent lab results  , ABG: No results found for: PHART, RUI2KQU, PO2ART, IEZ9JEN, Q4YXZIFL, BEART, SOURCE, BNP: No results found for: BNP, CBC:   Lab Results   Component Value Date    WBC 3 29 (L) 04/24/2017    HGB 8 1 (L) 04/24/2017    HCT 24 6 (L) 04/24/2017    MCV 84 04/24/2017     (L) 04/24/2017    MCH 27 6 04/24/2017    MCHC 32 9 04/24/2017    RDW 17 9 (H) 04/24/2017    MPV 10 8 04/24/2017   , CMP:   Lab Results   Component Value Date     04/24/2017    K 3 5 04/24/2017  (H) 04/24/2017    CO2 23 04/24/2017    ANIONGAP 9 04/24/2017    BUN 10 04/24/2017    CREATININE 0 72 04/24/2017    GLUCOSE 80 04/24/2017    CALCIUM 7 4 (L) 04/24/2017    EGFR >60 0 04/24/2017   , PT/INR: No results found for: PT, INR, Troponin: No results found for: TROPONINI    Imaging and other studies: I have personally reviewed pertinent films in PACS     CTChest 4/23/17  FINDINGS:     LUNGS: There is persistent right lower lobe consolidation that is improved since the prior study of 2/22/2017  No new areas of consolidation identified  No pneumothorax  No definitive tracheal endobronchial lesions identified      PLEURA: Unremarkable      HEART/GREAT VESSELS: Unremarkable for patient's age      MEDIASTINUM AND KG: Unremarkable      CHEST WALL AND LOWER NECK: Unremarkable      VISUALIZED STRUCTURES IN THE UPPER ABDOMEN: Pneumobilia is again partially visualized      OSSEOUS STRUCTURES: No acute fracture  No destructive osseous lesion      IMPRESSION:     Persistent right lower lobe consolidation that is improved since the prior study of 2/22/2017  However, the persistence since 2/22/2017 still raises the possibility of other process such as chronic eosinophilic pneumonia or even possibility of   neoplastic process  Bronchoscopy should be considered for further evaluation        Pulmonary function testing: No PFTs to be reviewed    EKG, Pathology, and Other Studies: I have personally reviewed pertinent reports        Code Status: Level 3 - DNAR and DNI      Freedom Zaman PA-C

## 2017-04-24 NOTE — PROGRESS NOTES
Spoke with Dr Devon David from [facility]  D/t pt's blood sugars, no Humalog to be given tonight and lantus is to be decreased  Orders to follow

## 2017-04-24 NOTE — SOCIAL WORK
Met with pt and reviewed role of CM  Pt resides at [facility] in [place], and is a therapist and counselor  She has a first floor room, no steps  Uses walker on occasion  She reports she has 24/7 support at the facility  Her children live out of state  She shares a room with her director and another counselor  Pt has script coverage and uses [facility]  Pt has a glucometer, but believes "it does not work " She has has it since living in [place]  Reports she has been in the [address] since last Sept  Will flag chart for script for new glucometer  Has hx of HH, [facility] following stoke and MI last year  Will have transport home  CM reviewed d/c planning process including the following: identifying help at home, patient preference for d/c planning needs, Discharge Lounge, [facility] Meds to Bed program, availability of treatment team to discuss questions or concerns patient and/or family may have regarding understanding medications and recognizing signs and symptoms once discharged  CM also encouraged patient to follow up with all recommended appointments after discharge  Patient advised of importance for patient and family to participate in managing patients medical well being

## 2017-04-25 VITALS
DIASTOLIC BLOOD PRESSURE: 72 MMHG | WEIGHT: 142.86 LBS | HEIGHT: 62 IN | OXYGEN SATURATION: 97 % | TEMPERATURE: 98.3 F | HEART RATE: 70 BPM | SYSTOLIC BLOOD PRESSURE: 112 MMHG | BODY MASS INDEX: 26.29 KG/M2 | RESPIRATION RATE: 18 BRPM

## 2017-04-25 PROBLEM — J18.1 CONSOLIDATION LUNG (HCC): Status: ACTIVE | Noted: 2017-04-25

## 2017-04-25 PROBLEM — E16.2 HYPOGLYCEMIA: Status: ACTIVE | Noted: 2017-04-25

## 2017-04-25 LAB
ANION GAP SERPL CALCULATED.3IONS-SCNC: 6 MMOL/L (ref 4–13)
BACTERIA UR CULT: NORMAL
BACTERIA UR CULT: NORMAL
BUN SERPL-MCNC: 9 MG/DL (ref 5–25)
CALCIUM SERPL-MCNC: 8 MG/DL (ref 8.3–10.1)
CHLORIDE SERPL-SCNC: 112 MMOL/L (ref 100–108)
CO2 SERPL-SCNC: 25 MMOL/L (ref 21–32)
CREAT SERPL-MCNC: 0.69 MG/DL (ref 0.6–1.3)
ERYTHROCYTE [DISTWIDTH] IN BLOOD BY AUTOMATED COUNT: 18.1 % (ref 11.6–15.1)
EST. AVERAGE GLUCOSE BLD GHB EST-MCNC: 206 MG/DL
GFR SERPL CREATININE-BSD FRML MDRD: >60 ML/MIN/1.73SQ M
GLUCOSE SERPL-MCNC: 147 MG/DL (ref 65–140)
GLUCOSE SERPL-MCNC: 253 MG/DL (ref 65–140)
GLUCOSE SERPL-MCNC: 35 MG/DL (ref 65–140)
HBA1C MFR BLD: 8.8 % (ref 4.2–6.3)
HCT VFR BLD AUTO: 25.4 % (ref 34.8–46.1)
HGB BLD-MCNC: 8.3 G/DL (ref 11.5–15.4)
MCH RBC QN AUTO: 27.7 PG (ref 26.8–34.3)
MCHC RBC AUTO-ENTMCNC: 32.7 G/DL (ref 31.4–37.4)
MCV RBC AUTO: 85 FL (ref 82–98)
PLATELET # BLD AUTO: 132 THOUSANDS/UL (ref 149–390)
PMV BLD AUTO: 10.9 FL (ref 8.9–12.7)
POTASSIUM SERPL-SCNC: 4.2 MMOL/L (ref 3.5–5.3)
RBC # BLD AUTO: 3 MILLION/UL (ref 3.81–5.12)
SODIUM SERPL-SCNC: 143 MMOL/L (ref 136–145)
WBC # BLD AUTO: 3.37 THOUSAND/UL (ref 4.31–10.16)

## 2017-04-25 PROCEDURE — 85027 COMPLETE CBC AUTOMATED: CPT | Performed by: FAMILY MEDICINE

## 2017-04-25 PROCEDURE — 80048 BASIC METABOLIC PNL TOTAL CA: CPT | Performed by: FAMILY MEDICINE

## 2017-04-25 PROCEDURE — 83036 HEMOGLOBIN GLYCOSYLATED A1C: CPT | Performed by: FAMILY MEDICINE

## 2017-04-25 PROCEDURE — 82948 REAGENT STRIP/BLOOD GLUCOSE: CPT

## 2017-04-25 RX ORDER — TRAMADOL HYDROCHLORIDE 50 MG/1
50 TABLET ORAL DAILY PRN
COMMUNITY

## 2017-04-25 RX ORDER — LISINOPRIL 5 MG/1
5 TABLET ORAL DAILY
Qty: 30 TABLET | Refills: 0 | Status: SHIPPED | OUTPATIENT
Start: 2017-04-25 | End: 2017-05-25

## 2017-04-25 RX ORDER — BUDESONIDE AND FORMOTEROL FUMARATE DIHYDRATE 160; 4.5 UG/1; UG/1
2 AEROSOL RESPIRATORY (INHALATION)
Qty: 12 G | Refills: 0 | Status: SHIPPED | OUTPATIENT
Start: 2017-04-25 | End: 2017-05-25

## 2017-04-25 RX ORDER — ALBUTEROL SULFATE 90 UG/1
2 AEROSOL, METERED RESPIRATORY (INHALATION) EVERY 4 HOURS PRN
Qty: 1 INHALER | Refills: 0 | Status: SHIPPED | OUTPATIENT
Start: 2017-04-25 | End: 2017-05-25

## 2017-04-25 RX ORDER — MIRTAZAPINE 30 MG/1
30 TABLET, FILM COATED ORAL
Qty: 30 TABLET | Refills: 0 | Status: SHIPPED | OUTPATIENT
Start: 2017-04-25 | End: 2017-05-25

## 2017-04-25 RX ORDER — TRAZODONE HYDROCHLORIDE 50 MG/1
50 TABLET ORAL
COMMUNITY

## 2017-04-25 RX ORDER — ENALAPRIL MALEATE 10 MG/1
10 TABLET ORAL 2 TIMES DAILY
COMMUNITY
End: 2017-04-25 | Stop reason: HOSPADM

## 2017-04-25 RX ADMIN — DICYCLOMINE HYDROCHLORIDE 10 MG: 10 CAPSULE ORAL at 06:09

## 2017-04-25 RX ADMIN — PANTOPRAZOLE SODIUM 20 MG: 20 TABLET, DELAYED RELEASE ORAL at 06:25

## 2017-04-25 RX ADMIN — Medication 500 UNITS: at 12:20

## 2017-04-25 RX ADMIN — GABAPENTIN 800 MG: 400 CAPSULE ORAL at 09:12

## 2017-04-25 RX ADMIN — FLUOXETINE 40 MG: 20 CAPSULE ORAL at 09:12

## 2017-04-25 RX ADMIN — PHENAZOPYRIDINE HYDROCHLORIDE 100 MG: 100 TABLET ORAL at 12:20

## 2017-04-25 RX ADMIN — DICYCLOMINE HYDROCHLORIDE 10 MG: 10 CAPSULE ORAL at 12:20

## 2017-04-25 RX ADMIN — BUDESONIDE AND FORMOTEROL FUMARATE DIHYDRATE 2 PUFF: 160; 4.5 AEROSOL RESPIRATORY (INHALATION) at 09:12

## 2017-04-25 RX ADMIN — PANCRELIPASE 6000 UNITS: 30000; 6000; 19000 CAPSULE, DELAYED RELEASE PELLETS ORAL at 09:12

## 2017-04-25 RX ADMIN — PANCRELIPASE 6000 UNITS: 30000; 6000; 19000 CAPSULE, DELAYED RELEASE PELLETS ORAL at 12:20

## 2017-04-25 RX ADMIN — PHENAZOPYRIDINE HYDROCHLORIDE 100 MG: 100 TABLET ORAL at 09:12

## 2017-04-25 RX ADMIN — HEPARIN SODIUM 5000 UNITS: 5000 INJECTION, SOLUTION INTRAVENOUS; SUBCUTANEOUS at 06:09

## 2017-04-25 RX ADMIN — INSULIN LISPRO 2 UNITS: 100 INJECTION, SOLUTION INTRAVENOUS; SUBCUTANEOUS at 12:20

## 2017-04-25 RX ADMIN — AMLODIPINE BESYLATE 10 MG: 10 TABLET ORAL at 09:12

## 2017-04-25 NOTE — DISCHARGE INSTRUCTIONS
Hypoglycemia in a Person with Diabetes   AMBULATORY CARE:   Hypoglycemia  is a serious condition that happens when your blood glucose (sugar) level drops too low  The blood sugar level is usually too high in a person with diabetes, but the level can also drop too low  It is important to follow your diabetes management plan to keep your blood sugar level steady  Common signs and symptoms include the following:   · Headache, hunger, or nervousness     · Trouble thinking or moodiness     · Sweating, or a pounding heartbeat     · Forgetfulness, confusion, or double vision     · Weakness or trouble walking     · Numbness and tingling in your fingers or around your mouth     · Seizures or loss of consciousness  Call 911 for any of the following:   · You have a seizure or pass out  · You feel you are going to pass out  · You have trouble thinking clearly  Seek care immediately if:  · Your blood sugar is less than 50 mg/dL and does not respond to treatment  Contact your healthcare provider if:   · You have had symptoms of low blood sugar several times  · You have questions about the amount of insulin or diabetes medicine you are taking  · You have questions or concerns about your condition or care  Manage hypoglycemia:   · Check your blood sugar level right away if you have symptoms of hypoglycemia  Hypoglycemia is usually 70 mg/dL or below  Ask your healthcare provider what blood sugar level is too low for you  · If your blood sugar level is too low, eat or drink 15 grams of fast-acting carbohydrate  Examples of this amount of fast-acting carbohydrate are 4 ounces (½ cup) of fruit juice or 4 ounces of regular soda  Other examples are 2 tablespoons of raisins or 3 to 4 glucose tablets  Check your blood sugar level 15 minutes later  If the level is still low (less than 100 mg/dL), have another 15 grams of carbohydrate   When the level returns to 100 mg/dL, eat a snack or meal that contains carbohydrate and protein  This will help prevent another drop in blood sugar  Always carefully follow your healthcare provider's instructions on how to treat low blood sugar levels  · Always carry a source of fast-acting carbohydrate  If you have symptoms of hypoglycemia and you do not have a blood glucose meter, have a source of fast-acting carbohydrate anyway  Avoid carbohydrate foods that are high in fat  The fat content may make it take longer to increase your blood sugar level  Ask your healthcare provider if you should carry a glucagon kit  Glucagon is a medicine that is injected when you develop severe hypoglycemia and become unconscious  Check the expiration date every month and replace it before it expires  · Teach others how to help you if you have symptoms of hypoglycemia  Tell them about the symptoms of hypoglycemia  Ask them to give you a source of fast-acting carbohydrate if you cannot get it yourself  Ask them to give you a glucagon injection if you have symptoms of hypoglycemia and you become unconscious or have a seizure  Ask them to call 911   This is an emergency  Tell them never to try to make you swallow anything if you faint or have a seizure  · Wear medical alert jewelry  or carry a card that says you have diabetes  Ask where to get these items  Prevent hypoglycemia:   · Take diabetes medicine as directed  Take your medicine at the right time and in the right amount  Your healthcare provider may change your blood sugar goals if you get hypoglycemia often  · Eat regular meals and snacks  Talk to your dietitian or healthcare provider about a meal plan that is right for you  Do not skip meals  · Check your blood sugar level as directed  Ask your healthcare provider what your blood sugar levels should be before and after you eat  Ask when and how often to check your blood sugar level  You may need to check at least 3 times each day   Record your blood sugar level results and take the record with you when you see your healthcare provider  Your provider may use the record to make changes to your medicine, food, or exercise schedules  · Check your blood sugar level before you exercise  Exercise can decrease your blood sugar level  If your blood sugar level is less than 100 mg/dL, have a carbohydrate snack  Examples are 4 to 6 crackers, ½ banana, 8 ounces (1 cup) of nonfat or 1% milk, or 4 ounces (½ cup) of juice  If you will exercise for more than 1 hour, you may need to check your blood sugar level every 30 minutes  Your healthcare provider may also recommend that you check your blood sugar level after exercise  · Be aware of how alcohol affects your blood sugar level  Alcohol can cause your blood sugar level to drop for up to 12 hours after drinking  Ask your healthcare provider if alcohol is safe for you  If you drink alcohol, always have a snack or meal at the same time  Women should limit alcohol to 1 drink a day  Men should limit alcohol to 2 drinks a day  A drink of alcohol is 12 ounces of beer, 5 ounces of wine, or 1½ ounces of liquor  Follow up with your healthcare provider as directed: You may need dose changes to your insulin or oral diabetes medicine if you have hypoglycemia  Write down your questions so you remember to ask them during your visits  © 2017 [address] is for End User's use only and may not be sold, redistributed or otherwise used for commercial purposes  All illustrations and images included in CareNotes® are the copyrighted property of [employer] M [employer]  or [employer]bal[employer] Miles  The above information is an  only  It is not intended as medical advice for individual conditions or treatments  Talk to your doctor, nurse or pharmacist before following any medical regimen to see if it is safe and effective for you        COPD (Chronic Obstructive Pulmonary Disease)   WHAT YOU NEED TO KNOW:   Chronic obstructive pulmonary disease (COPD) is a lung disease that makes it hard for you to breathe  It is usually a result of lung damage caused by years of irritation and inflammation in your lungs  COPD is a serious condition that gets worse over time  There is no cure, but there are things you can do to feel better and prevent exacerbations  A COPD exacerbation is when your symptoms suddenly get worse  It is important to prevent exacerbations because they cause more lung damage  DISCHARGE INSTRUCTIONS:   Manage COPD and help prevent exacerbations:   · Do not smoke and avoid others who smoke  If you smoke, it is never too late to quit  You are likely to live longer and breathe easier if you quit smoking  You may also have fewer COPD exacerbations  Ask for information about medicines and support programs that can help you quit  · Be aware of and avoid things that make your symptoms worse  Cold weather and sudden temperature changes can trigger an exacerbation  Fumes from cars and chemicals, air pollution, and perfume can also increase your symptoms  · Exercise daily  Exercising for at least 20 minutes per day can help increase your energy and decrease shortness of breath  Walking or riding a bike are good ways to exercise  Ask about the best exercise plan for you  · Prevent infections that can be dangerous when you have COPD  Get a flu vaccine every year as soon as it becomes available  Ask if you should also get vaccines to prevent pneumonia, whooping cough, tetanus, and diphtheria  Avoid people who are sick, and wash your hands often  Use pursed-lip breathing any time you feel short of breath: Take a deep breath in through your nose  Slowly breathe out through your mouth with your lips pursed for twice as long as you inhaled  You can also practice this breathing pattern while you bend, lift, climb stairs, or exercise   It slows down your breathing and helps move more air in and out of your lungs  Medicines:   · Medicines  to open your airways, decrease swelling and inflammation in your lungs, or treat an infection may be given  You may need 2 or more medicines  A short-acting medicine relieves symptoms quickly  Long-acting medicines will control or prevent symptoms  Ask for more information about the medicines you are given and how to use them safely  · Take your medicine as directed  Contact your healthcare provider if you think your medicine is not helping or if you have side effects  Tell him if you are allergic to any medicine  Keep a list of the medicines, vitamins, and herbs you take  Include the amounts, and when and why you take them  Bring the list or the pill bottles to follow-up visits  Carry your medicine list with you in case of an emergency  Follow up with your healthcare provider as directed: You may need more tests  Your healthcare provider may refer you to a pulmonary (lung) specialist  Write down your questions so you remember to ask them during your visits  Pulmonary rehabilitation:  Your healthcare provider may recommend a program to help you manage your symptoms and improve your quality of life  It may include nutritional counseling and exercise, such as walking, to strengthen your lungs  Make decisions about your choices for future treatment:  Ask for information about advanced medical directives and living rojas  These documents help you decide and write down your choices for treatment and end-of-life care  It is best to complete them when you feel well and can think clearly about your wishes  The information can then be kept for future use if you are in the hospital or become very ill  Seek care immediately if:   · You are confused, dizzy, or feel faint  · Your arm or leg feels warm, tender, and painful  It may look swollen and red  · You feel lightheaded, short of breath, and have chest pain  · You cough up blood    Contact your healthcare provider if:   · You have more shortness of breath than usual      · You need more medicine than usual to control your symptoms  · You are coughing or wheezing more than usual      · You are coughing up more mucus, or it is a different color or has a different odor  · You gain more than 3 pounds in a week  · You have a fever, a runny or stuffy nose, and a sore throat, or other cold or flu symptoms  · Your skin, lips, or nails start to turn blue  · You have swelling in your legs or ankles  · You are very tired or weak for more than a day  · You notice changes in your mood, or changes in your ability to think or concentrate  · You have questions or concerns about your condition or care  © 2017 [address] is for End User's use only and may not be sold, redistributed or otherwise used for commercial purposes  All illustrations and images included in CareNotes® are the copyrighted property of [employer] M [employer] Inc  or Osbaldo Miles  The above information is an  only  It is not intended as medical advice for individual conditions or treatments  Talk to your doctor, nurse or pharmacist before following any medical regimen to see if it is safe and effective for you  Kidney Stones   WHAT YOU NEED TO KNOW:   Kidney stones form in the urinary system when the water and waste in your urine are out of balance  When this happens, certain types of waste crystals separate from the urine  The crystals build up and form kidney stones  You may have 1 or more kidney stones  DISCHARGE INSTRUCTIONS:   Seek care immediately:   · You have vomiting that is not relieved by medicine  Contact your healthcare provider if:   · You have a fever  · You have trouble passing urine  · You see blood in your urine  · You have severe pain  · You have any questions or concerns about your condition or care    Medicines:   · NSAIDs , such as ibuprofen, help decrease swelling, pain, and fever  This medicine is available with or without a doctor's order  NSAIDs can cause stomach bleeding or kidney problems in certain people  If you take blood thinner medicine, always ask your healthcare provider if NSAIDs are safe for you  Always read the medicine label and follow directions  · Prescription medicine  may be given  Ask how to take this medicine safely  · Medicines  to balance your electrolytes may be needed  · Take your medicine as directed  Contact your healthcare provider if you think your medicine is not helping or if you have side effects  Tell him or her if you are allergic to any medicine  Keep a list of the medicines, vitamins, and herbs you take  Include the amounts, and when and why you take them  Bring the list or the pill bottles to follow-up visits  Carry your medicine list with you in case of an emergency  Follow up with your healthcare provider as directed: You may need to return for more tests  Write down your questions so you remember to ask them during your visits  Self-care:   · Drink plenty of liquids  Your healthcare provider may tell you to drink at least 8 to 12 (eight-ounce) cups of liquids each day  This helps flush out the kidney stones when you urinate  Water is the best liquid to drink  · Strain your urine every time you go to the bathroom  Urinate through a strainer or a piece of thin cloth to catch the stones  Take the stones to your healthcare provider so they can be sent to the lab for tests  This will help your healthcare providers plan the best treatment for you  · Eat a variety of healthy foods  Healthy foods include fruits, vegetables, whole-grain breads, low-fat dairy products, beans, and fish  You may need to limit how much sodium (salt) or protein you eat  Ask for information about the best foods for you  · Stay active  Your stones may pass more easily by if you stay active   Ask about the best activities for you  After you pass your kidney stones:  Once you have passed your kidney stones, your healthcare provider may  order a 24-hour urine test  Results from a 24-hour urine test will help your healthcare provider plan ways to prevent more stones from forming  If you are told to do a 24-hour test, your healthcare provider will give you more instructions  © 2017 [address] is for End User's use only and may not be sold, redistributed or otherwise used for commercial purposes  All illustrations and images included in CareNotes® are the copyrighted property of [employer]  or Osbaldo Miles  The above information is an  only  It is not intended as medical advice for individual conditions or treatments  Talk to your doctor, nurse or pharmacist before following any medical regimen to see if it is safe and effective for you

## 2017-04-25 NOTE — DISCHARGE SUMMARY
Discharge Summary - Petros Tristan 59 y o  female MRN: [id number]    Unit/Bed#: [facility] 610-01 Encounter: [id number]    Admission Date: 4/23/2017   Discharge Date: 4/25/2017    Diagnosis:   Sepsis [A41 9]  Diabetes [E11 9]  UTI (urinary tract infection) [N39 0]  Pneumonia [J18 9]  Hypoglycemia [E16 2]     Patient Active Problem List   Diagnosis    Hypertension    Diabetes mellitus    Kidney stones    Herniated lumbar intervertebral disc    COPD (chronic obstructive pulmonary disease)    CAP (community acquired pneumonia)    Acute respiratory failure with hypoxia    Anemia    Depression    IVDU (intravenous drug user)    NSTEMI (non-ST elevated myocardial infarction) II    Hepatitis C    Encephalopathy acute, likely metabolic    Hypernatremia    RLL pneumonia    UTI (urinary tract infection)    Hypothermia       Hospital Course: 59yo female PMHx of Insulin dependent DM2, hx of drug abuse living in half-way house, NSTEMI/CVA s/p intubation for PNA 11/2016, and hx of pancreatic ca s/p whipple 2011  Was brought to [facility] by EMS after she was found unconscious by staff at home  Per EMS, blood glucose was 30s, hypothermic at 93 7, admitted for further evaluation  All of the following problems were addressed before discharge:    1  Hypoglycemia : Insulin Dependent DM2,   Differential: Dehydration (in [facility] house stay, patient says she eats well) vs Medication non-compliance vs Autonomic dysregulation, Cannot r/o insulinoma given her pancreatic CA hx  - s/p PO glucagon by EMS, IVFs continuous w/accuchecks  - Pending C peptide & proinsulin  - home lantus of 30 was decreased to 15 hs, but her AM was 35, and all insulin was stopped on discharge  --> Discharged with the rest of her home regimen PTA: Metformin 1000g bid  2  Hypothermia, resolved   - initially low temp 93--> 97 s/p light therapy 3hrs  - UDS negative     3   Right Lower Lobe Consolidation:   DDX: Aspiration pneumonitis vs  Mass   - Persistent s/p PNA 11/20/2016; concerning for mass/neoplastic process; primary given smoking hx vs mets given hx pancreatic ca   - Lactic acid 1 1, WBC 7,  - IV abx initially given but were d/c given no cough, sputum production, SOB, WATSON, O2 requirement, labs, and clinical apperance  --> C/S Pulmonology: Repeat CT chest in 6 weeks, patient refused bronchoscopy      4  Right Nephrolithiasis : S/p stent   Acute on chronic; Pain controlled moderately with tramadol; also taking flomax 0 4 and pyridium  -Follow up outpatient with Urology: s/p uteroscopy w/staghorn calculus +urease 3/1/2017, w/post-op 1 5cm fragments also likely passing fragments per note on 4/19/2017 follow-up      5  Abdominal pain,   A  RUQ: tolerable, stable  Differential Dx: #2 vs #3   - Possible diaphragmatic irritation given mass per #2  B  Lower  Differential diagnosis: Fecal impaction per constipation, vs  Cystitis (recently treated with full course outpatient 5-7 days PTA)  ++blood, negative Urine culture 60K enterococcus, No sexual activity or discharge  --> Had BM after miralax, colace, and enema, senna  Counseled to limit bentyl usage        Significant Findings/Abnormal Results with this admission:  · WNL:  · UDS  · Abnormal:  · CT chest: persistent RLL consolidation  · CT a/p: s/p whipple's, 1 5 cm R renal stone, fatty liver, chronic pneumobilia, s/p cristal  · Pending labs or imaging:  · Pro-insulin  · C-peptide    Discharge Medications:  See after visit summary for reconciled discharge medications provided to patient and family  Continue all home medications except as follows: · Stop:  · Humalog   · Lantus 30 units HS  · Enalipril 10mg BID  · Metoprolol 25mg BID  · Start:  · Lisinopril 5mg QD  · Symbicort bid  · Albuterol prn wheezing or cough     Physician Related Follow Up:   See after visit summary for information related to follow-up care and any pertinent home health orders     · PCP:   · SALVATORE  · Diabetes medications  · BP control    · Pulmonology:  · 6 week follow up for repeat CT chest    · Urology   · Follow up appointment: 4/29/17 for kidney stone and dysuria          Exam on Day of Discharge:  Vitals:    04/24/17 1530 04/24/17 1907 04/25/17 0006 04/25/17 0716   BP: 130/64 140/70 107/61 112/72   Pulse: 82 84 62 70   Resp: 20 20 20 18   Temp: 98 2 °F (36 8 °C) 98 4 °F (36 9 °C) 98 2 °F (36 8 °C) 98 3 °F (36 8 °C)   TempSrc: Oral Oral Oral Oral   SpO2: 98% 98% 97% 97%   Weight:       Height:         Physical Exam  Constitutional: She is oriented to person, place, and time  She appears well-developed and well-nourished  No distress  HENT:   Head: Normocephalic  Eyes: Conjunctivae are normal  Right eye exhibits no discharge  Left eye exhibits no discharge  Neck: Normal range of motion  Cardiovascular: Normal rate and regular rhythm  Murmur heard  Pulmonary/Chest: Effort normal  No respiratory distress  She has no wheezes  CTA  Abdominal: Bowel sounds are normal  She exhibits distension and mass  There is tenderness  There is no rebound  Areas of firmness consistent with constipation, worse tenderness to palpation at RUQ & mild-moderate at suprapubic to firm palpation  + CVA tenderness on R   Musculoskeletal: Normal range of motion  She exhibits no edema or tenderness  Neurological: She is alert and oriented to person, place, and time  She exhibits normal muscle tone  Skin: Skin is warm  She is not diaphoretic  Psychiatric: She has a normal mood and affect  Discharge instructions/Information to patient and family:   See after visit summary for information provided to patient and family  Discharge Statement:  I spent 30 minutes discharging the patient  This time was spent on the day of discharge  I had direct contact with the patient on the day of discharge  Procedures Performed: No orders of the defined types were placed in this encounter  Complications: None    Condition at Discharge: Good   Disposition: Home  Planned Readmission: No        Teja Colin DO, PGY-2  Danuta Thakur, PGY-1  [facility]  4/25/2017

## 2017-04-27 NOTE — PROGRESS NOTES
Assessment  1  Benign essential hypertension (401 1) (I10)   2  CVA (cerebral vascular accident) (434 91) (I63 9)   3  Right kidney stone (592 0) (N20 0)   4  Insomnia (780 52) (G47 00)   5  Uncontrolled type 2 diabetes mellitus (250 02) (E11 65)    Plan   Dysuria    · Pyridium 100 MG Oral Tablet; Take 1 tablet 3 times per day for 3 days    TraMADol HCl - 50 MG Oral Tablet; TAKE 1 TABLET EVERY 12 HOURS AS NEEDED; Therapy: 21Apr2017 to (Evaluate:21May2017); Last Rx:21Apr2017; Status: ACTIVE Ordered  Rx By: Ashley Bermeo; Dispense: 30 Days ; #:60 Tablet; Refill: 0;   For: Right kidney stone; KELLY = N; Print Rx  TraZODone HCl - 50 MG Oral Tablet; TAKE 1 TABLET AT BEDTIME; Therapy: 21Apr2017 to (Evaluate:21May2017)  Requested for: 21Apr2017; Last Rx:21Apr2017; Status: ACTIVE Ordered  Rx By: Ashley Bermeo; Dispense: 30 Days ; #:30 Tablet; Refill: 0;   For: Insomnia; KELLY = N; Verified Transmission to [address] [place] [address]     Discussion/Summary    60 yo female with:1) Right kidney stone: s/p cystoscopy, stent insertion and lithotripsy by Dr Lucero Mt 3/1/17  Seen urology for follow up earlier this week  Likely pain is from patient passing small stone fragments  No signs of urinary infection  Ordered US kidney bladder and KUB to be done prior to next visit per urology in 6 months  Rx for 1 month supply #60 tablets of tramadol 50 mg to be used with tylenol and naproxen for pain  PDMP was checked prior to prescription being given to patient  Pyridium to help with spasm  Continue Flomax per urology  2) Pulmonary nodule: 5 mm RUL nodule seen incidentally on imaging while patient in the hospital  Will need repeat scan at next visit next month 5/2017  3) DM: Cinthia Efe continues to improve slowly  Down to 9 0 from 9 5  Continue Lantus 25 units at bedtime, humalog 10 units with meals, metformin 1000 mg BID  UTD on eye and foot exams  4) HTN with hx of CVA: BP at goal 140/78 today  Continue metoprolol, Amlodipine, enalapril   Paperwork completed for her rehab home facility given her hx of CVA and residual left sided weakness for work restrictions and recommendations  5) Insomnia: likely secondary to pain from passing kidney stones  States Mirtazapine is no longer helping her to sleep  Will try short course of trazodone 50 mg at bedtime  Given patients remote hx of drug abuse willl not prescribe zolpidem at this time  6) HM: No pap needed, s/p completed hysterectomy  Mammogram ordered, patient encouraged to make apt  Defer colonoscopy discussion to next visit as patient not interested today  Continue ASA 81 mg  Follow up in 1 month for chronic care  The patient was counseled regarding instructions for management,Wt7xfetrhn and family education  Possible side effects of new medications were reviewed with the patient/guardian today  The treatment plan was reviewed with the patient/guardian  The patient/guardian understands and agrees with the treatment plan     Self Referrals: No      Chief Complaint  kidney stone follow up   Patient is here today for follow up of chronic conditions described in HPI  History of Present Illness  58 yo female here for follow up  Complains of right sided back and groin pain  Known kidney stone  Saw urology this week, given #5 pain pills  Complains of pain with urination  Needs paperwork filled out  Review of Systems    Constitutional: no awdhcLFp4go chills  Cardiovascular: no chest njnwHQf1yl palpitations  Respiratory: no shortness of rwjxwdDPb7ry cough  Gastrointestinal: abdominal pain, nzgFp2vm icnsspTWu3rp diarrhea  Genitourinary: dysuria  Neurological: limb weakness, vpoKq9cl headache  ROS reviewed  Active Problems   1  Abnormal echocardiogram (793 2) (R93 1)   2  Abnormal EKG (794 31) (R94 31)   3  Bilateral leg edema (782 3) (R60 0)   4  CAP (community acquired pneumonia) (5) (J18 9)   5   Chronic bilateral low back pain with right-sided sciatica (724 2,724 3,338 29)   (M54 41,G89 29)   6  Chronic GERD (530 81) (K21 9)   7  Colon cancer screening (V76 51) (Z12 11)   8  CVA (cerebral vascular accident) (434 91) (I63 9)   9  Depression (311) (F32 9)   10  Dry skin (701 1) (L85 3)   11  Dysuria (788 1) (R30 0)   12  Encounter for screening mammogram for breast cancer (V76 12) (Z12 31)   13  Encounter for screening mammogram for malignant neoplasm of breast (V76 12)    (Z12 31)   14  Hospital discharge follow-up (V67 59) (Z09)   15  Insomnia (780 52) (G47 00)   16  Need for influenza vaccination (V04 81) (Z23)   17  Need for pneumococcal vaccination (V03 82) (Z23)   18  NSTEMI, initial episode of care (410 71) (I21 4)   19  Obesity (278 00) (E66 9)   20  Preop cardiovascular exam (V72 81) (Z01 810)   21  Preop examination (V72 84) (Z01 818)   22  Pulmonary nodule (793 11) (R91 1)   23  Right kidney stone (592 0) (N20 0)   24  Screening for lipid disorders (V77 91) (Z13 220)   25  Staghorn calculus (592 0) (N20 0)   26  Tobacco abuse (305 1) (Z72 0)   27  Uncontrolled type 2 diabetes mellitus (250 02) (E11 65)   28  Weight loss (783 21) (R63 4)    Benign essential hypertension (401 1) (I10)       COPD (chronic obstructive pulmonary disease) (496) (J44 9)          Past Medical History  1  History of Acute respiratory failure with hypoxemia (518 81) (J96 01)   2  History of Herniated disc (722 2)   3  History of chronic obstructive lung disease (V12 69) (Z87 09)   4  History of depression (V11 8) (Z86 59)   5  History of malignant neoplasm of pancreas (V10 09) (Z85 07)   6  History of Type 2 diabetes mellitus with complication, with long-term current use of   insulin (250 90,V58 67) (E11 8,Z79  4)    The active problems and past medical history were reviewed and updated today  Surgical History  1  History of Appendectomy   2  History of Oophorectomy - Bilat (Removal Of Both Ovaries) Laparoscopic   3   History of Proximal Subtotal Pancreatectomy (Whipple Procedure)   4  History of Total Abdominal Hysterectomy    The surgical history was reviewed and updated today  Family History  Mother    1  Family history of cardiac disorder (V17 49) (Z82 49)  Father    2  Family history of diabetes mellitus (V18 0) (Z83 3)   3  Family history of hypertension (V17 49) (Z82 49)    The family history was reviewed and updated today  Social History   · Always uses seat belt   · Current every day smoker (305 1) (F17 200)   · Home   · No alcohol use   · No illicit drug use   · Previous intravenous drug user   · Smokes less than 1 pack of cigarettes per day (305 1) (F17 210)  The social history was reviewed and is unchanged  Current Meds   1  1st Tier Unifine Pentips Plus 32G X 4 MM Miscellaneous; USE AS DIRECTED   TWICE A   DAY; Therapy: 94YOH8235 to (Last Rx:14Apr2017)  Requested for: 14Apr2017 Ordered   2  Accu-Chek Rebekah Plus In Vitro Strip; TEST 3 TIMES DAILY; Therapy: 16TVP4686 to (Evaluate:29Jun2017)  Requested for: 82PSC1626; Last   Rx:14Feb2017; Status: ACTIVE - Transmit to Pharmacy - Awaiting Verification Ordered   3  Accu-Chek Rebekah Plus w/Device Kit; test three times a a day; Therapy: 34IKO3490 to (Last Rx:14Feb2017)  Requested for: 19TSG0974; Status:   ACTIVE - Transmit to Pharmacy - Awaiting Verification Ordered   4  Accu-Chek FastClix Lancets Miscellaneous; check glucose tid; Therapy: 45PBB6968 to (Last Rx:14Feb2017)  Requested for: 27PQA8348; Status:   ACTIVE - Transmit to Pharmacy - Awaiting Verification Ordered   5  AmLODIPine Besylate 10 MG Oral Tablet; TAKE 1 TABLET DAILY; Therapy: 78HPW8421 to (Evaluate:22Jun2017)  Requested for: 43MAC9637; Last   Rx:24Mar2017 Ordered   6  Ammonium Lactate 12 % External Lotion; APPLY  AND RUB  IN A THIN FILM TO   AFFECTED AREAS TWICE DAILY  (AM AND PM); Therapy: 93SXZ5710 to (Last Rx:07Apr2017)  Requested for: 07Apr2017 Ordered   7  Aspirin 81 MG Oral Tablet Chewable; USE AS DIRECTED;    Therapy: 69Omd3835 to (Evaluate:22Jun2017)  Requested for: 76QVF7276; Last   Rx:24Mar2017 Ordered   8  BD Insulin Syringe Ultrafine 31G X 5/16 1 ML Miscellaneous; Use three times daily and   as directed; Therapy: 29XVY2840 to (Last Rx:58Cyv1374)  Requested for: 95WIJ3752 Ordered   9  Creon 6000 UNIT Oral Capsule Delayed Release Particles; TAKE ONE CAPSULE   BY   MOUTH   DAILY; Therapy: 56TCI2886 to (Last Rx:24Mar2017)  Requested for: 24Mar2017 Ordered   10  Dicyclomine HCl - 10 MG Oral Capsule; TAKE ONE TABLET   BY MOUTH   TWICE A DAY; Therapy: 86SND1949 to (AINIVC:45CRM3632)  Requested for: 21GMB8476; Last    Rx:24Mar2017 Ordered   11  Easy Touch Insulin Syringe 31G X 5/16 0 3 ML Miscellaneous; Therapy: 98NMX5249 to Recorded   12  Enalapril Maleate 10 MG Oral Tablet; TAKE 1 TABLET TWICE DAILY; Therapy: 18Ioj4095 to (Evaluate:90Jkr1552); Last Rx:24Mar2017 Ordered   13  Eucerin External Cream; USE AS DIRECTED; Therapy: 54AIJ6947 to (Last Rx:24Mar2017)  Requested for: 24Mar2017 Ordered   14  FLUoxetine HCl - 40 MG Oral Capsule; take 1 capsule daily; Therapy: 46ITE9866 to (Evaluate:72Tqy0085)  Requested for: 97HTV9375; Last    Rx:24Mar2017 Ordered   15  Furosemide 20 MG Oral Tablet; Therapy: (Recorded:40Yxh7116) to Recorded   16  Gabapentin 800 MG Oral Tablet; TAKE ONE TABLET   BY MOUTH   THREE TIMES A DAY; Therapy: 11YHB3296 to (Evaluate:78Bfn9754)  Requested for: 67WUV4507; Last    Rx:10Feb2017 Ordered   17  HumaLOG 100 UNIT/ML Subcutaneous Solution; USE 10 UNITS BEFORE MEALS; Therapy: 79IAA5950 to (Sebastian Ackermanet)  Requested for: 52SDL3422; Last    Rx:10Feb2017 Ordered   18  Hydrocodone-Acetaminophen 5-325 MG Oral Tablet; TAKE 1 TABLET EVERY 6 HOURS    AS NEEDED FOR PAIN;    Therapy: 39Bal5752 to (Evaluate:58Sjt1380); Last Rx:19Apr2017 Ordered   19  HydrOXYzine Pamoate 50 MG Oral Capsule; Therapy: (Recorded:19Apr2017) to Recorded   20   Lantus SoloStar 100 UNIT/ML Subcutaneous Solution Pen-injector; INJECT 30 UNIT    Daily  Requested for: 01GAD7307; Last Rx:27Vpc4543 Ordered   21  Magnesium Oxide 250 MG Oral Tablet; Therapy: (Recorded:19Apr2017) to Recorded   22  Metoprolol Tartrate 25 MG Oral Tablet; TAKE 1 TABLET TWICE DAILY; Therapy: 54Pip5459 to (Evaluate:19Mar2018); Last Rx:24Mar2017 Ordered   23  Mirtazapine 15 MG Oral Tablet; 1/2 tablet at nightime; Therapy: 12QYF3637 to (Evaluate:23May2017)  Requested for: 12PAI6309; Last    Rx:24Mar2017 Ordered   24  Naproxen 500 MG Oral Tablet; TAKE 1 TABLET EVERY 12 HOURS AS NEEDED; Therapy: 66NQX1632 to (Evaluate:22Jun2017)  Requested for: 43BBZ2485; Last    Rx:24Mar2017 Ordered   25  Naproxen 500 MG Oral Tablet; TAKE 1 TABLET EVERY 12 HOURS AS NEEDED; Therapy: 19Apr2017 to (Evaluate:02Cef0595); Last Rx:19Apr2017 Ordered   26  Omeprazole 20 MG Oral Capsule Delayed Release; Therapy: 72XDA2524 to Recorded   27  Omeprazole 20 MG Oral Tablet Delayed Release; Take 1 tablet daily; Therapy: 83HFE1269 to (Roque Reed)  Requested for: 92TRC6738; Last    Rx:24Mar2017 Ordered   28  OxyCODONE HCl - 5 MG Oral Tablet; TAKE 1 TABLET TWICE DAILY AS NEEDED FOR    PAIN;    Therapy: 86PER2173 to (Evaluate:12Jan2017); Last Rx:36Dyj2754 Ordered   29  Pantoprazole Sodium 40 MG Oral Tablet Delayed Release; Therapy: (Recorded:19Apr2017) to Recorded   30  Potassium Chloride Greta ER 20 MEQ Oral Tablet Extended Release; Therapy: (Recorded:19Apr2017) to Recorded   31  Senna 8 8 MG/5ML Oral Syrup; Therapy: (Recorded:19Apr2017) to Recorded   32  Tamsulosin HCl - 0 4 MG Oral Capsule; take 1 capsule daily; Therapy: 19Apr2017 to (Evaluate:02Yue1101); Last Rx:19Apr2017 Ordered   33  Tamsulosin HCl - 0 4 MG Oral Capsule; Therapy: (Recorded:19Apr2017) to Recorded   34  True Metrix Blood Glucose Test In Vitro Strip; TEST 3 TIMES DAILY DX 72577;     Therapy: 25WOR2672 to (Last Rx:47Pqk6960)  Requested for: 62JCZ0269; Status:    ACTIVE - Transmit to Pharmacy - Awaiting Verification Ordered    Allergies  1  Cipro    Vitals  Vital Signs    Recorded: 21Apr2017 08:29AM   Temperature 96 8 F   Heart Rate 84   Respiration 14   Systolic 913   Diastolic 78   Height 5 ft 2 in   Weight 135 lb 2 oz   BMI Calculated 24 71   BSA Calculated 1 62   Pain Scale 9     Physical Exam    Constitutional   General appearance: No acute distress, well appearing and well nourished  Eyes   Conjunctiva and lids: No swelling, erythema or discharge  Pupils and irises: Equal, round and reactive to light  Ears, Nose, Mouth, and Throat   Oropharynx: Normal with no erythema, edema, exudate or lesions  Pulmonary   Respiratory effort: No increased work of breathing or signs of respiratory distress  Auscultation of lungs: Clear to auscultation  Cardiovascular   Auscultation of heart: Abnormal   The heart rate was normal  The rhythm was regular  A grade 2 systolic murmur was heard at the LUSB  Examination of extremities for edema and/or varicosities: Normal     Abdomen   Abdomen: Abnormal   Bowel sounds were normal  There was mild tenderness in the right lower quadrant  No rebound tenderness  right CVA tenderness  Skin   Skin and subcutaneous tissue: Normal without rashes or lesions  Psychiatric   Orientation to person, place, and time: Normal     Mood and affect: Normal          Attending Note  Attending Note: I discussed the case with the Resident and reviewed the Resident's note,Rb0I vised the LsrvvqjjPMi9T agree with the Resident management plan as it was presented to me  Level of Participation: I was present in clinic, but did not examine the patient  I agree with the Resident's note  Future Appointments    Date/Time Provider Specialty Site   05/02/2017 09:00 AM Coy Beaver DO Family Medicine [facility]   06/01/2017 02:40 PM Allison Mckeon [address] [place] [address]   06/05/2017 11:30 AM LAST Alonzo   Pulmonary Medicine  [facility] Amadeo Coho   05/01/2017 01:15 PM [facility], [facility]  Urology [facility]     Signatures   Electronically signed by : Gregorio Mckeon DO; Apr 21 2017 10:46PM EST                       (Author)    Electronically signed by : Christy Grossman DO;  Apr 26 2017  2:31PM EST                       (Author)

## 2017-04-28 ENCOUNTER — TRANSCRIBE ORDERS (OUTPATIENT)
Dept: ADMINISTRATIVE | Facility: HOSPITAL | Age: 62
End: 2017-04-28

## 2017-04-28 DIAGNOSIS — R93.89 ABNORMAL RADIOLOGICAL FINDINGS IN SKIN AND SUBCUTANEOUS TISSUE: Primary | ICD-10-CM

## 2017-04-28 LAB
BACTERIA BLD CULT: NORMAL
BACTERIA BLD CULT: NORMAL

## 2017-05-02 ENCOUNTER — LAB REQUISITION (OUTPATIENT)
Dept: LAB | Facility: HOSPITAL | Age: 62
End: 2017-05-02
Payer: COMMERCIAL

## 2017-05-02 ENCOUNTER — ALLSCRIPTS OFFICE VISIT (OUTPATIENT)
Dept: OTHER | Facility: OTHER | Age: 62
End: 2017-05-02

## 2017-05-02 DIAGNOSIS — R31.9 HEMATURIA: ICD-10-CM

## 2017-05-02 PROCEDURE — 87086 URINE CULTURE/COLONY COUNT: CPT | Performed by: UROLOGY

## 2017-05-03 LAB — BACTERIA UR CULT: NORMAL

## 2017-05-17 ENCOUNTER — HOSPITAL ENCOUNTER (EMERGENCY)
Facility: HOSPITAL | Age: 62
Discharge: HOME | End: 2017-05-17
Attending: EMERGENCY MEDICINE | Admitting: EMERGENCY MEDICINE
Payer: COMMERCIAL

## 2017-05-17 ENCOUNTER — APPOINTMENT (EMERGENCY)
Dept: RADIOLOGY | Facility: HOSPITAL | Age: 62
End: 2017-05-17
Payer: COMMERCIAL

## 2017-05-17 ENCOUNTER — APPOINTMENT (EMERGENCY)
Dept: CT IMAGING | Facility: HOSPITAL | Age: 62
End: 2017-05-17
Payer: COMMERCIAL

## 2017-05-17 VITALS
SYSTOLIC BLOOD PRESSURE: 181 MMHG | HEART RATE: 70 BPM | OXYGEN SATURATION: 98 % | RESPIRATION RATE: 18 BRPM | DIASTOLIC BLOOD PRESSURE: 85 MMHG | TEMPERATURE: 98.4 F

## 2017-05-17 DIAGNOSIS — K52.9 COLITIS: ICD-10-CM

## 2017-05-17 DIAGNOSIS — G89.29 FLANK PAIN, CHRONIC: ICD-10-CM

## 2017-05-17 DIAGNOSIS — N39.0 UTI (URINARY TRACT INFECTION): Primary | ICD-10-CM

## 2017-05-17 DIAGNOSIS — R10.9 FLANK PAIN, CHRONIC: ICD-10-CM

## 2017-05-17 LAB
ALBUMIN SERPL BCP-MCNC: 2.8 G/DL (ref 3.5–5)
ALP SERPL-CCNC: 174 U/L (ref 46–116)
ALT SERPL W P-5'-P-CCNC: 44 U/L (ref 12–78)
ANION GAP SERPL CALCULATED.3IONS-SCNC: 10 MMOL/L (ref 4–13)
AST SERPL W P-5'-P-CCNC: 55 U/L (ref 5–45)
BACTERIA UR QL AUTO: ABNORMAL /HPF
BASOPHILS # BLD AUTO: 0.03 THOUSANDS/ΜL (ref 0–0.1)
BASOPHILS NFR BLD AUTO: 1 % (ref 0–1)
BILIRUB SERPL-MCNC: 0.4 MG/DL (ref 0.2–1)
BILIRUB UR QL STRIP: NEGATIVE
BUN SERPL-MCNC: 14 MG/DL (ref 5–25)
CALCIUM SERPL-MCNC: 9.1 MG/DL (ref 8.3–10.1)
CHLORIDE SERPL-SCNC: 103 MMOL/L (ref 100–108)
CLARITY UR: ABNORMAL
CLARITY, POC: NORMAL
CO2 SERPL-SCNC: 22 MMOL/L (ref 21–32)
COLOR UR: YELLOW
COLOR, POC: YELLOW
CREAT SERPL-MCNC: 1.16 MG/DL (ref 0.6–1.3)
EOSINOPHIL # BLD AUTO: 0.08 THOUSAND/ΜL (ref 0–0.61)
EOSINOPHIL NFR BLD AUTO: 1 % (ref 0–6)
ERYTHROCYTE [DISTWIDTH] IN BLOOD BY AUTOMATED COUNT: 17.9 % (ref 11.6–15.1)
EXT BILIRUBIN, UA: NEGATIVE
EXT BLOOD URINE: NORMAL
EXT GLUCOSE, UA: NEGATIVE
EXT KETONES: NEGATIVE
EXT NITRITE, UA: NEGATIVE
EXT PH, UA: 6
EXT PROTEIN, UA: 30
EXT SPECIFIC GRAVITY, UA: 1.02
EXT UROBILINOGEN: 0.2
GFR SERPL CREATININE-BSD FRML MDRD: 47.2 ML/MIN/1.73SQ M
GLUCOSE SERPL-MCNC: 176 MG/DL (ref 65–140)
GLUCOSE UR STRIP-MCNC: NEGATIVE MG/DL
HCT VFR BLD AUTO: 29 % (ref 34.8–46.1)
HGB BLD-MCNC: 9.4 G/DL (ref 11.5–15.4)
HGB UR QL STRIP.AUTO: ABNORMAL
KETONES UR STRIP-MCNC: NEGATIVE MG/DL
LEUKOCYTE ESTERASE UR QL STRIP: ABNORMAL
LIPASE SERPL-CCNC: 28 U/L (ref 73–393)
LYMPHOCYTES # BLD AUTO: 1.34 THOUSANDS/ΜL (ref 0.6–4.47)
LYMPHOCYTES NFR BLD AUTO: 21 % (ref 14–44)
MCH RBC QN AUTO: 27.9 PG (ref 26.8–34.3)
MCHC RBC AUTO-ENTMCNC: 32.4 G/DL (ref 31.4–37.4)
MCV RBC AUTO: 86 FL (ref 82–98)
MONOCYTES # BLD AUTO: 0.57 THOUSAND/ΜL (ref 0.17–1.22)
MONOCYTES NFR BLD AUTO: 9 % (ref 4–12)
NEUTROPHILS # BLD AUTO: 4.36 THOUSANDS/ΜL (ref 1.85–7.62)
NEUTS SEG NFR BLD AUTO: 68 % (ref 43–75)
NITRITE UR QL STRIP: NEGATIVE
NON-SQ EPI CELLS URNS QL MICRO: ABNORMAL /HPF
PH UR STRIP.AUTO: 5.5 [PH] (ref 4.5–8)
PLATELET # BLD AUTO: 153 THOUSANDS/UL (ref 149–390)
PMV BLD AUTO: 11.3 FL (ref 8.9–12.7)
POTASSIUM SERPL-SCNC: 4.6 MMOL/L (ref 3.5–5.3)
PROT SERPL-MCNC: 7.2 G/DL (ref 6.4–8.2)
PROT UR STRIP-MCNC: ABNORMAL MG/DL
RBC # BLD AUTO: 3.37 MILLION/UL (ref 3.81–5.12)
RBC #/AREA URNS AUTO: ABNORMAL /HPF
SODIUM SERPL-SCNC: 135 MMOL/L (ref 136–145)
SP GR UR STRIP.AUTO: 1.02 (ref 1–1.03)
UROBILINOGEN UR QL STRIP.AUTO: 0.2 E.U./DL
WBC # BLD AUTO: 6.38 THOUSAND/UL (ref 4.31–10.16)
WBC # BLD EST: NORMAL 10*3/UL
WBC #/AREA URNS AUTO: ABNORMAL /HPF

## 2017-05-17 PROCEDURE — 99284 EMERGENCY DEPT VISIT MOD MDM: CPT

## 2017-05-17 PROCEDURE — 36415 COLL VENOUS BLD VENIPUNCTURE: CPT | Performed by: PHYSICIAN ASSISTANT

## 2017-05-17 PROCEDURE — 81002 URINALYSIS NONAUTO W/O SCOPE: CPT | Performed by: PHYSICIAN ASSISTANT

## 2017-05-17 PROCEDURE — 81001 URINALYSIS AUTO W/SCOPE: CPT | Performed by: PHYSICIAN ASSISTANT

## 2017-05-17 PROCEDURE — 85025 COMPLETE CBC W/AUTO DIFF WBC: CPT | Performed by: PHYSICIAN ASSISTANT

## 2017-05-17 PROCEDURE — 80053 COMPREHEN METABOLIC PANEL: CPT | Performed by: PHYSICIAN ASSISTANT

## 2017-05-17 PROCEDURE — 74178 CT ABD&PLV WO CNTR FLWD CNTR: CPT

## 2017-05-17 PROCEDURE — 96361 HYDRATE IV INFUSION ADD-ON: CPT

## 2017-05-17 PROCEDURE — 83690 ASSAY OF LIPASE: CPT | Performed by: PHYSICIAN ASSISTANT

## 2017-05-17 PROCEDURE — 96374 THER/PROPH/DIAG INJ IV PUSH: CPT

## 2017-05-17 PROCEDURE — 71020 HB CHEST X-RAY 2VW FRONTAL&LATL: CPT

## 2017-05-17 PROCEDURE — 87086 URINE CULTURE/COLONY COUNT: CPT | Performed by: PHYSICIAN ASSISTANT

## 2017-05-17 RX ORDER — CEPHALEXIN 500 MG/1
500 CAPSULE ORAL 3 TIMES DAILY
Qty: 21 CAPSULE | Refills: 0 | Status: SHIPPED | OUTPATIENT
Start: 2017-05-17 | End: 2017-05-17

## 2017-05-17 RX ORDER — ACETAMINOPHEN 325 MG/1
TABLET ORAL
Qty: 15 TABLET | Refills: 0 | Status: SHIPPED | OUTPATIENT
Start: 2017-05-17

## 2017-05-17 RX ORDER — KETOROLAC TROMETHAMINE 30 MG/ML
30 INJECTION, SOLUTION INTRAMUSCULAR; INTRAVENOUS ONCE
Status: COMPLETED | OUTPATIENT
Start: 2017-05-17 | End: 2017-05-17

## 2017-05-17 RX ORDER — ACETAMINOPHEN 325 MG/1
TABLET ORAL
Qty: 15 TABLET | Refills: 0 | Status: SHIPPED | OUTPATIENT
Start: 2017-05-17 | End: 2017-05-17

## 2017-05-17 RX ORDER — CEPHALEXIN 250 MG/1
500 CAPSULE ORAL ONCE
Status: COMPLETED | OUTPATIENT
Start: 2017-05-17 | End: 2017-05-17

## 2017-05-17 RX ORDER — CEPHALEXIN 500 MG/1
500 CAPSULE ORAL 3 TIMES DAILY
Qty: 21 CAPSULE | Refills: 0 | Status: SHIPPED | OUTPATIENT
Start: 2017-05-17 | End: 2017-05-24

## 2017-05-17 RX ADMIN — IOHEXOL 100 ML: 350 INJECTION, SOLUTION INTRAVENOUS at 13:59

## 2017-05-17 RX ADMIN — HEPARIN 300 UNITS: 100 SYRINGE at 15:14

## 2017-05-17 RX ADMIN — CEPHALEXIN 500 MG: 250 CAPSULE ORAL at 15:13

## 2017-05-17 RX ADMIN — SODIUM CHLORIDE 1000 ML: 0.9 INJECTION, SOLUTION INTRAVENOUS at 12:38

## 2017-05-17 RX ADMIN — KETOROLAC TROMETHAMINE 30 MG: 30 INJECTION, SOLUTION INTRAMUSCULAR at 12:46

## 2017-05-18 LAB — BACTERIA UR CULT: NORMAL

## 2017-05-30 ENCOUNTER — TRANSCRIBE ORDERS (OUTPATIENT)
Dept: PULMONOLOGY | Facility: HOSPITAL | Age: 62
End: 2017-05-30

## 2017-05-30 ENCOUNTER — HOSPITAL ENCOUNTER (OUTPATIENT)
Dept: PULMONOLOGY | Facility: HOSPITAL | Age: 62
Discharge: HOME | End: 2017-05-30
Payer: COMMERCIAL

## 2017-05-30 ENCOUNTER — GENERIC CONVERSION - ENCOUNTER (OUTPATIENT)
Dept: OTHER | Facility: OTHER | Age: 62
End: 2017-05-30

## 2017-05-30 ENCOUNTER — HOSPITAL ENCOUNTER (OUTPATIENT)
Dept: RADIOLOGY | Facility: HOSPITAL | Age: 62
Discharge: HOME | End: 2017-05-30
Payer: COMMERCIAL

## 2017-05-30 DIAGNOSIS — J44.9 CHRONIC OBSTRUCTIVE PULMONARY DISEASE, UNSPECIFIED COPD TYPE (HCC): Primary | ICD-10-CM

## 2017-05-30 DIAGNOSIS — J44.9 CHRONIC OBSTRUCTIVE PULMONARY DISEASE, UNSPECIFIED COPD TYPE (HCC): ICD-10-CM

## 2017-05-30 DIAGNOSIS — R93.89 ABNORMAL RADIOLOGICAL FINDINGS IN SKIN AND SUBCUTANEOUS TISSUE: ICD-10-CM

## 2017-05-30 PROCEDURE — 94760 N-INVAS EAR/PLS OXIMETRY 1: CPT

## 2017-05-30 PROCEDURE — 71250 CT THORAX DX C-: CPT

## 2017-05-30 PROCEDURE — 94726 PLETHYSMOGRAPHY LUNG VOLUMES: CPT

## 2017-05-30 PROCEDURE — 94060 EVALUATION OF WHEEZING: CPT

## 2017-05-30 PROCEDURE — 94729 DIFFUSING CAPACITY: CPT

## 2017-05-30 RX ORDER — ALBUTEROL SULFATE 2.5 MG/3ML
2.5 SOLUTION RESPIRATORY (INHALATION) ONCE
Status: COMPLETED | OUTPATIENT
Start: 2017-05-30 | End: 2017-05-30

## 2017-05-30 RX ORDER — SODIUM CHLORIDE FOR INHALATION 0.9 %
3 VIAL, NEBULIZER (ML) INHALATION ONCE
Status: COMPLETED | OUTPATIENT
Start: 2017-05-30 | End: 2017-05-30

## 2017-05-30 RX ORDER — ALBUTEROL SULFATE 2.5 MG/3ML
SOLUTION RESPIRATORY (INHALATION)
Status: DISPENSED
Start: 2017-05-30 | End: 2017-05-30

## 2017-05-30 RX ADMIN — Medication 3 ML: at 09:06

## 2017-05-30 RX ADMIN — ALBUTEROL SULFATE 2.5 MG: 2.5 SOLUTION RESPIRATORY (INHALATION) at 09:06

## 2017-05-31 DIAGNOSIS — I10 ESSENTIAL (PRIMARY) HYPERTENSION: ICD-10-CM

## 2017-05-31 DIAGNOSIS — R26.2 DIFFICULTY IN WALKING, NOT ELSEWHERE CLASSIFIED: ICD-10-CM

## 2017-05-31 DIAGNOSIS — R93.89 ABNORMAL FINDINGS ON DIAGNOSTIC IMAGING OF OTHER SPECIFIED BODY STRUCTURES: ICD-10-CM

## 2017-05-31 DIAGNOSIS — E11.65 TYPE 2 DIABETES MELLITUS WITH HYPERGLYCEMIA (HCC): ICD-10-CM

## 2017-06-01 ENCOUNTER — ALLSCRIPTS OFFICE VISIT (OUTPATIENT)
Dept: OTHER | Facility: OTHER | Age: 62
End: 2017-06-01

## 2017-06-05 ENCOUNTER — HOSPITAL ENCOUNTER (INPATIENT)
Facility: HOSPITAL | Age: 62
LOS: 6 days | Discharge: OTHER INSTITUTION | DRG: 133 | End: 2017-06-11
Attending: INTERNAL MEDICINE | Admitting: INTERNAL MEDICINE
Payer: COMMERCIAL

## 2017-06-05 ENCOUNTER — APPOINTMENT (INPATIENT)
Dept: RADIOLOGY | Facility: HOSPITAL | Age: 62
DRG: 133 | End: 2017-06-05
Payer: COMMERCIAL

## 2017-06-05 ENCOUNTER — ALLSCRIPTS OFFICE VISIT (OUTPATIENT)
Dept: OTHER | Facility: OTHER | Age: 62
End: 2017-06-05

## 2017-06-05 DIAGNOSIS — N20.0 KIDNEY STONES: Chronic | ICD-10-CM

## 2017-06-05 DIAGNOSIS — Z79.4 TYPE 2 DIABETES MELLITUS WITH HYPERGLYCEMIA, WITH LONG-TERM CURRENT USE OF INSULIN (HCC): Chronic | ICD-10-CM

## 2017-06-05 DIAGNOSIS — F32.A DEPRESSION: ICD-10-CM

## 2017-06-05 DIAGNOSIS — J44.9 CHRONIC OBSTRUCTIVE PULMONARY DISEASE, UNSPECIFIED COPD TYPE (HCC): Primary | Chronic | ICD-10-CM

## 2017-06-05 DIAGNOSIS — E11.65 TYPE 2 DIABETES MELLITUS WITH HYPERGLYCEMIA, WITH LONG-TERM CURRENT USE OF INSULIN (HCC): Chronic | ICD-10-CM

## 2017-06-05 DIAGNOSIS — M51.26 HERNIATED LUMBAR INTERVERTEBRAL DISC: Chronic | ICD-10-CM

## 2017-06-05 DIAGNOSIS — J96.01 ACUTE RESPIRATORY FAILURE WITH HYPOXIA (HCC): ICD-10-CM

## 2017-06-05 PROBLEM — Z85.07 HISTORY OF PANCREATIC CANCER: Chronic | Status: ACTIVE | Noted: 2017-06-05

## 2017-06-05 LAB
ALBUMIN SERPL BCP-MCNC: 2.7 G/DL (ref 3.5–5)
ALP SERPL-CCNC: 204 U/L (ref 46–116)
ALT SERPL W P-5'-P-CCNC: 24 U/L (ref 12–78)
ANION GAP SERPL CALCULATED.3IONS-SCNC: 9 MMOL/L (ref 4–13)
AST SERPL W P-5'-P-CCNC: 27 U/L (ref 5–45)
BASOPHILS # BLD AUTO: 0.02 THOUSANDS/ΜL (ref 0–0.1)
BASOPHILS NFR BLD AUTO: 0 % (ref 0–1)
BILIRUB SERPL-MCNC: 0.5 MG/DL (ref 0.2–1)
BUN SERPL-MCNC: 14 MG/DL (ref 5–25)
CALCIUM SERPL-MCNC: 8.9 MG/DL (ref 8.3–10.1)
CHLORIDE SERPL-SCNC: 95 MMOL/L (ref 100–108)
CO2 SERPL-SCNC: 27 MMOL/L (ref 21–32)
CREAT SERPL-MCNC: 1.19 MG/DL (ref 0.6–1.3)
EOSINOPHIL # BLD AUTO: 0.04 THOUSAND/ΜL (ref 0–0.61)
EOSINOPHIL NFR BLD AUTO: 0 % (ref 0–6)
ERYTHROCYTE [DISTWIDTH] IN BLOOD BY AUTOMATED COUNT: 15.1 % (ref 11.6–15.1)
GFR SERPL CREATININE-BSD FRML MDRD: 45.8 ML/MIN/1.73SQ M
GLUCOSE SERPL-MCNC: 361 MG/DL (ref 65–140)
GLUCOSE SERPL-MCNC: 379 MG/DL (ref 65–140)
GLUCOSE SERPL-MCNC: 412 MG/DL (ref 65–140)
GLUCOSE SERPL-MCNC: 606 MG/DL (ref 65–140)
GLUCOSE SERPL-MCNC: >500 MG/DL (ref 65–140)
HCT VFR BLD AUTO: 28.9 % (ref 34.8–46.1)
HGB BLD-MCNC: 9.5 G/DL (ref 11.5–15.4)
INR PPP: 1.16 (ref 0.86–1.16)
L PNEUMO1 AG UR QL IA.RAPID: NEGATIVE
LYMPHOCYTES # BLD AUTO: 1.07 THOUSANDS/ΜL (ref 0.6–4.47)
LYMPHOCYTES NFR BLD AUTO: 9 % (ref 14–44)
MCH RBC QN AUTO: 27.9 PG (ref 26.8–34.3)
MCHC RBC AUTO-ENTMCNC: 32.9 G/DL (ref 31.4–37.4)
MCV RBC AUTO: 85 FL (ref 82–98)
MONOCYTES # BLD AUTO: 0.57 THOUSAND/ΜL (ref 0.17–1.22)
MONOCYTES NFR BLD AUTO: 5 % (ref 4–12)
NEUTROPHILS # BLD AUTO: 9.68 THOUSANDS/ΜL (ref 1.85–7.62)
NEUTS SEG NFR BLD AUTO: 86 % (ref 43–75)
NT-PROBNP SERPL-MCNC: 1168 PG/ML
PLATELET # BLD AUTO: 169 THOUSANDS/UL (ref 149–390)
PMV BLD AUTO: 12.5 FL (ref 8.9–12.7)
POTASSIUM SERPL-SCNC: 5 MMOL/L (ref 3.5–5.3)
PROT SERPL-MCNC: 7.2 G/DL (ref 6.4–8.2)
PROTHROMBIN TIME: 15.2 SECONDS (ref 12.1–14.4)
RBC # BLD AUTO: 3.4 MILLION/UL (ref 3.81–5.12)
S PNEUM AG UR QL: NEGATIVE
SODIUM SERPL-SCNC: 131 MMOL/L (ref 136–145)
TROPONIN I SERPL-MCNC: <0.02 NG/ML
WBC # BLD AUTO: 11.38 THOUSAND/UL (ref 4.31–10.16)

## 2017-06-05 PROCEDURE — 94760 N-INVAS EAR/PLS OXIMETRY 1: CPT

## 2017-06-05 PROCEDURE — 87449 NOS EACH ORGANISM AG IA: CPT | Performed by: PHYSICIAN ASSISTANT

## 2017-06-05 PROCEDURE — 80053 COMPREHEN METABOLIC PANEL: CPT | Performed by: PHYSICIAN ASSISTANT

## 2017-06-05 PROCEDURE — 82948 REAGENT STRIP/BLOOD GLUCOSE: CPT

## 2017-06-05 PROCEDURE — 84484 ASSAY OF TROPONIN QUANT: CPT | Performed by: PHYSICIAN ASSISTANT

## 2017-06-05 PROCEDURE — 94664 DEMO&/EVAL PT USE INHALER: CPT

## 2017-06-05 PROCEDURE — 93005 ELECTROCARDIOGRAM TRACING: CPT | Performed by: PHYSICIAN ASSISTANT

## 2017-06-05 PROCEDURE — 85610 PROTHROMBIN TIME: CPT | Performed by: PHYSICIAN ASSISTANT

## 2017-06-05 PROCEDURE — 71020 HB CHEST X-RAY 2VW FRONTAL&LATL: CPT

## 2017-06-05 PROCEDURE — 83880 ASSAY OF NATRIURETIC PEPTIDE: CPT | Performed by: PHYSICIAN ASSISTANT

## 2017-06-05 PROCEDURE — 94660 CPAP INITIATION&MGMT: CPT

## 2017-06-05 PROCEDURE — 87040 BLOOD CULTURE FOR BACTERIA: CPT | Performed by: PHYSICIAN ASSISTANT

## 2017-06-05 PROCEDURE — 93005 ELECTROCARDIOGRAM TRACING: CPT

## 2017-06-05 PROCEDURE — 94762 N-INVAS EAR/PLS OXIMTRY CONT: CPT

## 2017-06-05 PROCEDURE — 85025 COMPLETE CBC W/AUTO DIFF WBC: CPT | Performed by: PHYSICIAN ASSISTANT

## 2017-06-05 PROCEDURE — 94640 AIRWAY INHALATION TREATMENT: CPT

## 2017-06-05 RX ORDER — HYDROCODONE BITARTRATE AND ACETAMINOPHEN 5; 325 MG/1; MG/1
1 TABLET ORAL EVERY 6 HOURS PRN
COMMUNITY

## 2017-06-05 RX ORDER — NAPROXEN 500 MG/1
500 TABLET ORAL 2 TIMES DAILY PRN
COMMUNITY

## 2017-06-05 RX ORDER — HEPARIN SODIUM 5000 [USP'U]/ML
5000 INJECTION, SOLUTION INTRAVENOUS; SUBCUTANEOUS EVERY 8 HOURS SCHEDULED
Status: DISCONTINUED | OUTPATIENT
Start: 2017-06-05 | End: 2017-06-11 | Stop reason: HOSPADM

## 2017-06-05 RX ORDER — PANTOPRAZOLE SODIUM 20 MG/1
20 TABLET, DELAYED RELEASE ORAL
Status: DISCONTINUED | OUTPATIENT
Start: 2017-06-06 | End: 2017-06-05

## 2017-06-05 RX ORDER — SODIUM CHLORIDE 9 MG/ML
100 INJECTION, SOLUTION INTRAVENOUS CONTINUOUS
Status: DISCONTINUED | OUTPATIENT
Start: 2017-06-05 | End: 2017-06-06

## 2017-06-05 RX ORDER — MIRTAZAPINE 15 MG/1
30 TABLET, FILM COATED ORAL
Status: DISCONTINUED | OUTPATIENT
Start: 2017-06-05 | End: 2017-06-11 | Stop reason: HOSPADM

## 2017-06-05 RX ORDER — INSULIN GLARGINE 100 [IU]/ML
25 INJECTION, SOLUTION SUBCUTANEOUS
Status: DISCONTINUED | OUTPATIENT
Start: 2017-06-05 | End: 2017-06-08

## 2017-06-05 RX ORDER — LEVALBUTEROL 1.25 MG/.5ML
1.25 SOLUTION, CONCENTRATE RESPIRATORY (INHALATION)
Status: DISCONTINUED | OUTPATIENT
Start: 2017-06-05 | End: 2017-06-05

## 2017-06-05 RX ORDER — FLUOXETINE HYDROCHLORIDE 20 MG/1
40 CAPSULE ORAL DAILY
Status: DISCONTINUED | OUTPATIENT
Start: 2017-06-06 | End: 2017-06-11 | Stop reason: HOSPADM

## 2017-06-05 RX ORDER — LEVALBUTEROL INHALATION SOLUTION 0.63 MG/3ML
0.63 SOLUTION RESPIRATORY (INHALATION)
Status: DISCONTINUED | OUTPATIENT
Start: 2017-06-05 | End: 2017-06-05

## 2017-06-05 RX ORDER — LIDOCAINE HYDROCHLORIDE 20 MG/ML
JELLY TOPICAL ONCE
Status: DISCONTINUED | OUTPATIENT
Start: 2017-06-05 | End: 2017-06-11 | Stop reason: HOSPADM

## 2017-06-05 RX ORDER — TRAMADOL HYDROCHLORIDE 50 MG/1
50 TABLET ORAL DAILY PRN
Status: DISCONTINUED | OUTPATIENT
Start: 2017-06-05 | End: 2017-06-11 | Stop reason: HOSPADM

## 2017-06-05 RX ORDER — GUAIFENESIN/DEXTROMETHORPHAN 100-10MG/5
10 SYRUP ORAL EVERY 4 HOURS PRN
Status: DISCONTINUED | OUTPATIENT
Start: 2017-06-05 | End: 2017-06-11 | Stop reason: HOSPADM

## 2017-06-05 RX ORDER — HYDROCODONE POLISTIREX AND CHLORPHENIRAMINE POLISTIREX 10; 8 MG/5ML; MG/5ML
5 SUSPENSION, EXTENDED RELEASE ORAL ONCE
Status: COMPLETED | OUTPATIENT
Start: 2017-06-05 | End: 2017-06-05

## 2017-06-05 RX ORDER — DICYCLOMINE HYDROCHLORIDE 10 MG/1
10 CAPSULE ORAL 2 TIMES DAILY
Status: DISCONTINUED | OUTPATIENT
Start: 2017-06-05 | End: 2017-06-11 | Stop reason: HOSPADM

## 2017-06-05 RX ORDER — AMMONIUM LACTATE 12 G/100G
LOTION TOPICAL AS NEEDED
Status: DISCONTINUED | OUTPATIENT
Start: 2017-06-05 | End: 2017-06-11 | Stop reason: HOSPADM

## 2017-06-05 RX ORDER — ACETAMINOPHEN 325 MG/1
650 TABLET ORAL EVERY 6 HOURS PRN
Status: DISCONTINUED | OUTPATIENT
Start: 2017-06-05 | End: 2017-06-11 | Stop reason: HOSPADM

## 2017-06-05 RX ORDER — SODIUM CHLORIDE FOR INHALATION 0.9 %
3 VIAL, NEBULIZER (ML) INHALATION
Status: DISCONTINUED | OUTPATIENT
Start: 2017-06-05 | End: 2017-06-05

## 2017-06-05 RX ORDER — HYDROCODONE BITARTRATE AND ACETAMINOPHEN 5; 325 MG/1; MG/1
1 TABLET ORAL EVERY 6 HOURS PRN
Status: DISCONTINUED | OUTPATIENT
Start: 2017-06-05 | End: 2017-06-11 | Stop reason: HOSPADM

## 2017-06-05 RX ORDER — HYDROCODONE POLISTIREX AND CHLORPHENIRAMINE POLISTIREX 10; 8 MG/5ML; MG/5ML
5 SUSPENSION, EXTENDED RELEASE ORAL EVERY 12 HOURS
Status: DISCONTINUED | OUTPATIENT
Start: 2017-06-06 | End: 2017-06-11 | Stop reason: HOSPADM

## 2017-06-05 RX ORDER — ASPIRIN 81 MG/1
81 TABLET, CHEWABLE ORAL DAILY
Status: DISCONTINUED | OUTPATIENT
Start: 2017-06-06 | End: 2017-06-11 | Stop reason: HOSPADM

## 2017-06-05 RX ORDER — ALBUTEROL SULFATE 90 UG/1
2 AEROSOL, METERED RESPIRATORY (INHALATION) EVERY 6 HOURS PRN
COMMUNITY

## 2017-06-05 RX ORDER — LEVALBUTEROL 1.25 MG/.5ML
1.25 SOLUTION, CONCENTRATE RESPIRATORY (INHALATION)
Status: DISCONTINUED | OUTPATIENT
Start: 2017-06-06 | End: 2017-06-11 | Stop reason: HOSPADM

## 2017-06-05 RX ORDER — LISINOPRIL 5 MG/1
5 TABLET ORAL DAILY
Status: DISCONTINUED | OUTPATIENT
Start: 2017-06-06 | End: 2017-06-11 | Stop reason: HOSPADM

## 2017-06-05 RX ORDER — ONDANSETRON 2 MG/ML
4 INJECTION INTRAMUSCULAR; INTRAVENOUS EVERY 6 HOURS PRN
Status: DISCONTINUED | OUTPATIENT
Start: 2017-06-05 | End: 2017-06-11 | Stop reason: HOSPADM

## 2017-06-05 RX ORDER — TRAZODONE HYDROCHLORIDE 50 MG/1
50 TABLET ORAL
Status: DISCONTINUED | OUTPATIENT
Start: 2017-06-05 | End: 2017-06-11 | Stop reason: HOSPADM

## 2017-06-05 RX ORDER — TAMSULOSIN HYDROCHLORIDE 0.4 MG/1
0.4 CAPSULE ORAL
Status: DISCONTINUED | OUTPATIENT
Start: 2017-06-05 | End: 2017-06-11 | Stop reason: HOSPADM

## 2017-06-05 RX ORDER — PANTOPRAZOLE SODIUM 40 MG/1
40 TABLET, DELAYED RELEASE ORAL
Status: DISCONTINUED | OUTPATIENT
Start: 2017-06-06 | End: 2017-06-11 | Stop reason: HOSPADM

## 2017-06-05 RX ORDER — SODIUM CHLORIDE FOR INHALATION 0.9 %
3 VIAL, NEBULIZER (ML) INHALATION
Status: DISCONTINUED | OUTPATIENT
Start: 2017-06-06 | End: 2017-06-05

## 2017-06-05 RX ORDER — GABAPENTIN 400 MG/1
800 CAPSULE ORAL 3 TIMES DAILY
Status: DISCONTINUED | OUTPATIENT
Start: 2017-06-05 | End: 2017-06-11 | Stop reason: HOSPADM

## 2017-06-05 RX ORDER — BENZONATATE 100 MG/1
100 CAPSULE ORAL 3 TIMES DAILY
Status: DISCONTINUED | OUTPATIENT
Start: 2017-06-05 | End: 2017-06-11 | Stop reason: HOSPADM

## 2017-06-05 RX ADMIN — LEVALBUTEROL HYDROCHLORIDE 1.25 MG: 1.25 SOLUTION, CONCENTRATE RESPIRATORY (INHALATION) at 19:08

## 2017-06-05 RX ADMIN — INSULIN LISPRO 15 UNITS: 100 INJECTION, SOLUTION INTRAVENOUS; SUBCUTANEOUS at 17:24

## 2017-06-05 RX ADMIN — HEPARIN SODIUM 5000 UNITS: 5000 INJECTION, SOLUTION INTRAVENOUS; SUBCUTANEOUS at 18:28

## 2017-06-05 RX ADMIN — AZITHROMYCIN MONOHYDRATE 500 MG: 500 INJECTION, POWDER, LYOPHILIZED, FOR SOLUTION INTRAVENOUS at 19:54

## 2017-06-05 RX ADMIN — INSULIN LISPRO 10 UNITS: 100 INJECTION, SOLUTION INTRAVENOUS; SUBCUTANEOUS at 17:23

## 2017-06-05 RX ADMIN — BENZONATATE 100 MG: 100 CAPSULE ORAL at 21:51

## 2017-06-05 RX ADMIN — TRAZODONE HYDROCHLORIDE 50 MG: 50 TABLET ORAL at 21:51

## 2017-06-05 RX ADMIN — ISODIUM CHLORIDE 3 ML: 0.03 SOLUTION RESPIRATORY (INHALATION) at 19:08

## 2017-06-05 RX ADMIN — CEFTRIAXONE SODIUM 1000 MG: 10 INJECTION, POWDER, FOR SOLUTION INTRAVENOUS at 18:29

## 2017-06-05 RX ADMIN — HYDROCODONE POLISTIREX AND CHLORPHENIRAMINE POLISTIREX 5 ML: 10; 8 SUSPENSION, EXTENDED RELEASE ORAL at 21:57

## 2017-06-05 RX ADMIN — HYDROCODONE BITARTRATE AND ACETAMINOPHEN 1 TABLET: 5; 325 TABLET ORAL at 22:10

## 2017-06-05 RX ADMIN — INSULIN GLARGINE 25 UNITS: 100 INJECTION, SOLUTION SUBCUTANEOUS at 21:53

## 2017-06-05 RX ADMIN — DICYCLOMINE HYDROCHLORIDE 10 MG: 10 CAPSULE ORAL at 18:28

## 2017-06-05 RX ADMIN — MIRTAZAPINE 30 MG: 15 TABLET, FILM COATED ORAL at 21:51

## 2017-06-05 RX ADMIN — GABAPENTIN 800 MG: 400 CAPSULE ORAL at 21:51

## 2017-06-05 RX ADMIN — PANCRELIPASE 6000 UNITS: 30000; 6000; 19000 CAPSULE, DELAYED RELEASE PELLETS ORAL at 18:29

## 2017-06-05 RX ADMIN — INSULIN LISPRO 4 UNITS: 100 INJECTION, SOLUTION INTRAVENOUS; SUBCUTANEOUS at 21:58

## 2017-06-05 RX ADMIN — BENZONATATE 100 MG: 100 CAPSULE ORAL at 18:28

## 2017-06-05 RX ADMIN — INSULIN LISPRO 5 UNITS: 100 INJECTION, SOLUTION INTRAVENOUS; SUBCUTANEOUS at 17:23

## 2017-06-05 RX ADMIN — TAMSULOSIN HYDROCHLORIDE 0.4 MG: 0.4 CAPSULE ORAL at 18:28

## 2017-06-05 RX ADMIN — FLUTICASONE PROPIONATE AND SALMETEROL 1 PUFF: 50; 250 POWDER RESPIRATORY (INHALATION) at 21:53

## 2017-06-05 RX ADMIN — HEPARIN SODIUM 5000 UNITS: 5000 INJECTION, SOLUTION INTRAVENOUS; SUBCUTANEOUS at 21:54

## 2017-06-05 RX ADMIN — SODIUM CHLORIDE 100 ML/HR: 0.9 INJECTION, SOLUTION INTRAVENOUS at 18:14

## 2017-06-06 ENCOUNTER — APPOINTMENT (INPATIENT)
Dept: CT IMAGING | Facility: HOSPITAL | Age: 62
DRG: 133 | End: 2017-06-06
Payer: COMMERCIAL

## 2017-06-06 ENCOUNTER — APPOINTMENT (INPATIENT)
Dept: RADIOLOGY | Facility: HOSPITAL | Age: 62
DRG: 133 | End: 2017-06-06
Payer: COMMERCIAL

## 2017-06-06 LAB
AMMONIA PLAS-SCNC: <10 UMOL/L (ref 11–35)
ANION GAP SERPL CALCULATED.3IONS-SCNC: 7 MMOL/L (ref 4–13)
ARTERIAL PATENCY WRIST A: YES
ATRIAL RATE: 117 BPM
ATRIAL RATE: 119 BPM
BASE EXCESS BLDA CALC-SCNC: 1.3 MMOL/L
BASOPHILS # BLD AUTO: 0.02 THOUSANDS/ΜL (ref 0–0.1)
BASOPHILS NFR BLD AUTO: 0 % (ref 0–1)
BUN SERPL-MCNC: 15 MG/DL (ref 5–25)
CALCIUM SERPL-MCNC: 8.8 MG/DL (ref 8.3–10.1)
CHLORIDE SERPL-SCNC: 104 MMOL/L (ref 100–108)
CO2 SERPL-SCNC: 29 MMOL/L (ref 21–32)
CREAT SERPL-MCNC: 0.99 MG/DL (ref 0.6–1.3)
EOSINOPHIL # BLD AUTO: 0.27 THOUSAND/ΜL (ref 0–0.61)
EOSINOPHIL NFR BLD AUTO: 3 % (ref 0–6)
ERYTHROCYTE [DISTWIDTH] IN BLOOD BY AUTOMATED COUNT: 15 % (ref 11.6–15.1)
GFR SERPL CREATININE-BSD FRML MDRD: 56.7 ML/MIN/1.73SQ M
GLUCOSE SERPL-MCNC: 112 MG/DL (ref 65–140)
GLUCOSE SERPL-MCNC: 123 MG/DL (ref 65–140)
GLUCOSE SERPL-MCNC: 133 MG/DL (ref 65–140)
GLUCOSE SERPL-MCNC: 172 MG/DL (ref 65–140)
GLUCOSE SERPL-MCNC: 178 MG/DL (ref 65–140)
GLUCOSE SERPL-MCNC: 358 MG/DL (ref 65–140)
HCO3 BLDA-SCNC: 25 MMOL/L (ref 22–28)
HCT VFR BLD AUTO: 26.8 % (ref 34.8–46.1)
HFNC FLOW LPM: 50
HGB BLD-MCNC: 8.7 G/DL (ref 11.5–15.4)
LYMPHOCYTES # BLD AUTO: 1.24 THOUSANDS/ΜL (ref 0.6–4.47)
LYMPHOCYTES NFR BLD AUTO: 12 % (ref 14–44)
MCH RBC QN AUTO: 27.9 PG (ref 26.8–34.3)
MCHC RBC AUTO-ENTMCNC: 32.5 G/DL (ref 31.4–37.4)
MCV RBC AUTO: 86 FL (ref 82–98)
MONOCYTES # BLD AUTO: 0.59 THOUSAND/ΜL (ref 0.17–1.22)
MONOCYTES NFR BLD AUTO: 6 % (ref 4–12)
NEUTROPHILS # BLD AUTO: 8.67 THOUSANDS/ΜL (ref 1.85–7.62)
NEUTS SEG NFR BLD AUTO: 79 % (ref 43–75)
NON VENT HFNC FIO2: 75
NON VENT TYPE HFNC: ABNORMAL
O2 CT BLDA-SCNC: 13.4 ML/DL (ref 16–23)
OXYHGB MFR BLDA: 89.9 % (ref 94–97)
P AXIS: 23 DEGREES
P AXIS: 24 DEGREES
PCO2 BLDA: 36.2 MM HG (ref 36–44)
PH BLDA: 7.46 [PH] (ref 7.35–7.45)
PLATELET # BLD AUTO: 166 THOUSANDS/UL (ref 149–390)
PMV BLD AUTO: 11.5 FL (ref 8.9–12.7)
PO2 BLDA: 60.7 MM HG (ref 75–129)
POTASSIUM SERPL-SCNC: 4 MMOL/L (ref 3.5–5.3)
PR INTERVAL: 132 MS
PR INTERVAL: 146 MS
QRS AXIS: 65 DEGREES
QRS AXIS: 66 DEGREES
QRSD INTERVAL: 76 MS
QRSD INTERVAL: 78 MS
QT INTERVAL: 306 MS
QT INTERVAL: 310 MS
QTC INTERVAL: 430 MS
QTC INTERVAL: 432 MS
RBC # BLD AUTO: 3.12 MILLION/UL (ref 3.81–5.12)
SODIUM SERPL-SCNC: 140 MMOL/L (ref 136–145)
SPECIMEN SOURCE: ABNORMAL
T WAVE AXIS: -1 DEGREES
T WAVE AXIS: 28 DEGREES
VENTRICULAR RATE: 117 BPM
VENTRICULAR RATE: 119 BPM
WBC # BLD AUTO: 10.79 THOUSAND/UL (ref 4.31–10.16)

## 2017-06-06 PROCEDURE — 82140 ASSAY OF AMMONIA: CPT | Performed by: INTERNAL MEDICINE

## 2017-06-06 PROCEDURE — 80048 BASIC METABOLIC PNL TOTAL CA: CPT | Performed by: PHYSICIAN ASSISTANT

## 2017-06-06 PROCEDURE — 94760 N-INVAS EAR/PLS OXIMETRY 1: CPT

## 2017-06-06 PROCEDURE — 94660 CPAP INITIATION&MGMT: CPT

## 2017-06-06 PROCEDURE — 82805 BLOOD GASES W/O2 SATURATION: CPT | Performed by: INTERNAL MEDICINE

## 2017-06-06 PROCEDURE — 87081 CULTURE SCREEN ONLY: CPT | Performed by: NURSE PRACTITIONER

## 2017-06-06 PROCEDURE — 94762 N-INVAS EAR/PLS OXIMTRY CONT: CPT

## 2017-06-06 PROCEDURE — 94761 N-INVAS EAR/PLS OXIMETRY MLT: CPT

## 2017-06-06 PROCEDURE — 94640 AIRWAY INHALATION TREATMENT: CPT

## 2017-06-06 PROCEDURE — 82948 REAGENT STRIP/BLOOD GLUCOSE: CPT

## 2017-06-06 PROCEDURE — 71010 HB CHEST X-RAY 1 VIEW FRONTAL (PORTABLE): CPT

## 2017-06-06 PROCEDURE — 85025 COMPLETE CBC W/AUTO DIFF WBC: CPT | Performed by: PHYSICIAN ASSISTANT

## 2017-06-06 RX ORDER — GUAIFENESIN 600 MG
1200 TABLET, EXTENDED RELEASE 12 HR ORAL EVERY 12 HOURS SCHEDULED
Status: DISCONTINUED | OUTPATIENT
Start: 2017-06-06 | End: 2017-06-11 | Stop reason: HOSPADM

## 2017-06-06 RX ORDER — METHYLPREDNISOLONE SODIUM SUCCINATE 40 MG/ML
40 INJECTION, POWDER, LYOPHILIZED, FOR SOLUTION INTRAMUSCULAR; INTRAVENOUS EVERY 8 HOURS SCHEDULED
Status: DISCONTINUED | OUTPATIENT
Start: 2017-06-06 | End: 2017-06-09

## 2017-06-06 RX ORDER — VANCOMYCIN HYDROCHLORIDE 1 G/200ML
15 INJECTION, SOLUTION INTRAVENOUS EVERY 24 HOURS
Status: DISCONTINUED | OUTPATIENT
Start: 2017-06-06 | End: 2017-06-08

## 2017-06-06 RX ORDER — METHYLPREDNISOLONE SODIUM SUCCINATE 125 MG/2ML
125 INJECTION, POWDER, LYOPHILIZED, FOR SOLUTION INTRAMUSCULAR; INTRAVENOUS ONCE
Status: COMPLETED | OUTPATIENT
Start: 2017-06-06 | End: 2017-06-06

## 2017-06-06 RX ADMIN — TAMSULOSIN HYDROCHLORIDE 0.4 MG: 0.4 CAPSULE ORAL at 18:34

## 2017-06-06 RX ADMIN — CEFEPIME HYDROCHLORIDE 2000 MG: 2 INJECTION, POWDER, FOR SOLUTION INTRAVENOUS at 12:10

## 2017-06-06 RX ADMIN — INSULIN LISPRO 1 UNITS: 100 INJECTION, SOLUTION INTRAVENOUS; SUBCUTANEOUS at 08:37

## 2017-06-06 RX ADMIN — HEPARIN SODIUM 5000 UNITS: 5000 INJECTION, SOLUTION INTRAVENOUS; SUBCUTANEOUS at 15:12

## 2017-06-06 RX ADMIN — IPRATROPIUM BROMIDE 0.5 MG: 0.5 SOLUTION RESPIRATORY (INHALATION) at 05:41

## 2017-06-06 RX ADMIN — INSULIN GLARGINE 25 UNITS: 100 INJECTION, SOLUTION SUBCUTANEOUS at 21:21

## 2017-06-06 RX ADMIN — LEVALBUTEROL HYDROCHLORIDE 1.25 MG: 1.25 SOLUTION, CONCENTRATE RESPIRATORY (INHALATION) at 20:41

## 2017-06-06 RX ADMIN — GABAPENTIN 800 MG: 400 CAPSULE ORAL at 18:33

## 2017-06-06 RX ADMIN — LEVALBUTEROL HYDROCHLORIDE 1.25 MG: 1.25 SOLUTION, CONCENTRATE RESPIRATORY (INHALATION) at 05:41

## 2017-06-06 RX ADMIN — INSULIN LISPRO 15 UNITS: 100 INJECTION, SOLUTION INTRAVENOUS; SUBCUTANEOUS at 08:38

## 2017-06-06 RX ADMIN — MIRTAZAPINE 30 MG: 15 TABLET, FILM COATED ORAL at 21:21

## 2017-06-06 RX ADMIN — CEFEPIME HYDROCHLORIDE 2000 MG: 2 INJECTION, POWDER, FOR SOLUTION INTRAVENOUS at 21:57

## 2017-06-06 RX ADMIN — BENZONATATE 100 MG: 100 CAPSULE ORAL at 08:36

## 2017-06-06 RX ADMIN — HEPARIN SODIUM 5000 UNITS: 5000 INJECTION, SOLUTION INTRAVENOUS; SUBCUTANEOUS at 21:21

## 2017-06-06 RX ADMIN — PANCRELIPASE 6000 UNITS: 30000; 6000; 19000 CAPSULE, DELAYED RELEASE PELLETS ORAL at 18:34

## 2017-06-06 RX ADMIN — PANCRELIPASE 6000 UNITS: 30000; 6000; 19000 CAPSULE, DELAYED RELEASE PELLETS ORAL at 08:36

## 2017-06-06 RX ADMIN — HYDROCODONE POLISTIREX AND CHLORPHENIRAMINE POLISTIREX 5 ML: 10; 8 SUSPENSION, EXTENDED RELEASE ORAL at 21:21

## 2017-06-06 RX ADMIN — GUAIFENESIN 1200 MG: 600 TABLET, EXTENDED RELEASE ORAL at 13:02

## 2017-06-06 RX ADMIN — FLUTICASONE PROPIONATE AND SALMETEROL 1 PUFF: 50; 500 POWDER RESPIRATORY (INHALATION) at 21:23

## 2017-06-06 RX ADMIN — PANTOPRAZOLE SODIUM 40 MG: 40 TABLET, DELAYED RELEASE ORAL at 05:56

## 2017-06-06 RX ADMIN — VANCOMYCIN HYDROCHLORIDE 1000 MG: 1 INJECTION, SOLUTION INTRAVENOUS at 13:02

## 2017-06-06 RX ADMIN — TRAZODONE HYDROCHLORIDE 50 MG: 50 TABLET ORAL at 21:21

## 2017-06-06 RX ADMIN — FLUOXETINE 40 MG: 20 CAPSULE ORAL at 08:36

## 2017-06-06 RX ADMIN — METHYLPREDNISOLONE SODIUM SUCCINATE 40 MG: 40 INJECTION, POWDER, FOR SOLUTION INTRAMUSCULAR; INTRAVENOUS at 21:21

## 2017-06-06 RX ADMIN — FLUTICASONE PROPIONATE AND SALMETEROL 1 PUFF: 50; 250 POWDER RESPIRATORY (INHALATION) at 08:38

## 2017-06-06 RX ADMIN — IPRATROPIUM BROMIDE 0.5 MG: 0.5 SOLUTION RESPIRATORY (INHALATION) at 13:59

## 2017-06-06 RX ADMIN — INSULIN LISPRO 4 UNITS: 100 INJECTION, SOLUTION INTRAVENOUS; SUBCUTANEOUS at 21:22

## 2017-06-06 RX ADMIN — DICYCLOMINE HYDROCHLORIDE 10 MG: 10 CAPSULE ORAL at 18:33

## 2017-06-06 RX ADMIN — LISINOPRIL 5 MG: 5 TABLET ORAL at 08:35

## 2017-06-06 RX ADMIN — GUAIFENESIN 1200 MG: 600 TABLET, EXTENDED RELEASE ORAL at 21:21

## 2017-06-06 RX ADMIN — LEVALBUTEROL HYDROCHLORIDE 1.25 MG: 1.25 SOLUTION, CONCENTRATE RESPIRATORY (INHALATION) at 01:23

## 2017-06-06 RX ADMIN — GABAPENTIN 800 MG: 400 CAPSULE ORAL at 21:20

## 2017-06-06 RX ADMIN — HYDROCODONE POLISTIREX AND CHLORPHENIRAMINE POLISTIREX 5 ML: 10; 8 SUSPENSION, EXTENDED RELEASE ORAL at 08:36

## 2017-06-06 RX ADMIN — IPRATROPIUM BROMIDE 0.5 MG: 0.5 SOLUTION RESPIRATORY (INHALATION) at 01:23

## 2017-06-06 RX ADMIN — GABAPENTIN 800 MG: 400 CAPSULE ORAL at 08:35

## 2017-06-06 RX ADMIN — INSULIN LISPRO 15 UNITS: 100 INJECTION, SOLUTION INTRAVENOUS; SUBCUTANEOUS at 12:48

## 2017-06-06 RX ADMIN — BENZONATATE 100 MG: 100 CAPSULE ORAL at 18:33

## 2017-06-06 RX ADMIN — ASPIRIN 81 MG 81 MG: 81 TABLET ORAL at 08:35

## 2017-06-06 RX ADMIN — HEPARIN SODIUM 5000 UNITS: 5000 INJECTION, SOLUTION INTRAVENOUS; SUBCUTANEOUS at 05:56

## 2017-06-06 RX ADMIN — BENZONATATE 100 MG: 100 CAPSULE ORAL at 21:20

## 2017-06-06 RX ADMIN — METHYLPREDNISOLONE SODIUM SUCCINATE 125 MG: 125 INJECTION, POWDER, FOR SOLUTION INTRAMUSCULAR; INTRAVENOUS at 10:36

## 2017-06-06 RX ADMIN — PANCRELIPASE 6000 UNITS: 30000; 6000; 19000 CAPSULE, DELAYED RELEASE PELLETS ORAL at 12:11

## 2017-06-06 RX ADMIN — DICYCLOMINE HYDROCHLORIDE 10 MG: 10 CAPSULE ORAL at 08:37

## 2017-06-06 RX ADMIN — IPRATROPIUM BROMIDE 0.5 MG: 0.5 SOLUTION RESPIRATORY (INHALATION) at 20:41

## 2017-06-06 RX ADMIN — LEVALBUTEROL HYDROCHLORIDE 1.25 MG: 1.25 SOLUTION, CONCENTRATE RESPIRATORY (INHALATION) at 13:59

## 2017-06-07 ENCOUNTER — APPOINTMENT (INPATIENT)
Dept: CT IMAGING | Facility: HOSPITAL | Age: 62
DRG: 133 | End: 2017-06-07
Payer: COMMERCIAL

## 2017-06-07 LAB
ANION GAP SERPL CALCULATED.3IONS-SCNC: 10 MMOL/L (ref 4–13)
BASOPHILS # BLD AUTO: 0.01 THOUSANDS/ΜL (ref 0–0.1)
BASOPHILS NFR BLD AUTO: 0 % (ref 0–1)
BUN SERPL-MCNC: 19 MG/DL (ref 5–25)
CALCIUM SERPL-MCNC: 8.9 MG/DL (ref 8.3–10.1)
CHLORIDE SERPL-SCNC: 102 MMOL/L (ref 100–108)
CO2 SERPL-SCNC: 27 MMOL/L (ref 21–32)
CREAT SERPL-MCNC: 1.03 MG/DL (ref 0.6–1.3)
EOSINOPHIL # BLD AUTO: 0.01 THOUSAND/ΜL (ref 0–0.61)
EOSINOPHIL NFR BLD AUTO: 0 % (ref 0–6)
ERYTHROCYTE [DISTWIDTH] IN BLOOD BY AUTOMATED COUNT: 15.1 % (ref 11.6–15.1)
GFR SERPL CREATININE-BSD FRML MDRD: 54.1 ML/MIN/1.73SQ M
GLUCOSE SERPL-MCNC: 118 MG/DL (ref 65–140)
GLUCOSE SERPL-MCNC: 196 MG/DL (ref 65–140)
GLUCOSE SERPL-MCNC: 234 MG/DL (ref 65–140)
GLUCOSE SERPL-MCNC: 238 MG/DL (ref 65–140)
GLUCOSE SERPL-MCNC: 249 MG/DL (ref 65–140)
HCT VFR BLD AUTO: 28.3 % (ref 34.8–46.1)
HGB BLD-MCNC: 9 G/DL (ref 11.5–15.4)
LYMPHOCYTES # BLD AUTO: 0.72 THOUSANDS/ΜL (ref 0.6–4.47)
LYMPHOCYTES NFR BLD AUTO: 7 % (ref 14–44)
MCH RBC QN AUTO: 27.4 PG (ref 26.8–34.3)
MCHC RBC AUTO-ENTMCNC: 31.8 G/DL (ref 31.4–37.4)
MCV RBC AUTO: 86 FL (ref 82–98)
MONOCYTES # BLD AUTO: 0.54 THOUSAND/ΜL (ref 0.17–1.22)
MONOCYTES NFR BLD AUTO: 5 % (ref 4–12)
MRSA NOSE QL CULT: NORMAL
NEUTROPHILS # BLD AUTO: 8.95 THOUSANDS/ΜL (ref 1.85–7.62)
NEUTS SEG NFR BLD AUTO: 88 % (ref 43–75)
PLATELET # BLD AUTO: 202 THOUSANDS/UL (ref 149–390)
PMV BLD AUTO: 11.7 FL (ref 8.9–12.7)
POTASSIUM SERPL-SCNC: 4.2 MMOL/L (ref 3.5–5.3)
RBC # BLD AUTO: 3.28 MILLION/UL (ref 3.81–5.12)
SODIUM SERPL-SCNC: 139 MMOL/L (ref 136–145)
WBC # BLD AUTO: 10.23 THOUSAND/UL (ref 4.31–10.16)

## 2017-06-07 PROCEDURE — 97163 PT EVAL HIGH COMPLEX 45 MIN: CPT

## 2017-06-07 PROCEDURE — 94668 MNPJ CHEST WALL SBSQ: CPT

## 2017-06-07 PROCEDURE — 80048 BASIC METABOLIC PNL TOTAL CA: CPT | Performed by: PHYSICIAN ASSISTANT

## 2017-06-07 PROCEDURE — 94760 N-INVAS EAR/PLS OXIMETRY 1: CPT

## 2017-06-07 PROCEDURE — G8979 MOBILITY GOAL STATUS: HCPCS

## 2017-06-07 PROCEDURE — 82948 REAGENT STRIP/BLOOD GLUCOSE: CPT

## 2017-06-07 PROCEDURE — 71275 CT ANGIOGRAPHY CHEST: CPT

## 2017-06-07 PROCEDURE — G8978 MOBILITY CURRENT STATUS: HCPCS

## 2017-06-07 PROCEDURE — 94640 AIRWAY INHALATION TREATMENT: CPT

## 2017-06-07 PROCEDURE — 85025 COMPLETE CBC W/AUTO DIFF WBC: CPT | Performed by: PHYSICIAN ASSISTANT

## 2017-06-07 PROCEDURE — 94660 CPAP INITIATION&MGMT: CPT

## 2017-06-07 RX ORDER — FUROSEMIDE 10 MG/ML
40 INJECTION INTRAMUSCULAR; INTRAVENOUS ONCE
Status: COMPLETED | OUTPATIENT
Start: 2017-06-07 | End: 2017-06-07

## 2017-06-07 RX ADMIN — METHYLPREDNISOLONE SODIUM SUCCINATE 40 MG: 40 INJECTION, POWDER, FOR SOLUTION INTRAMUSCULAR; INTRAVENOUS at 05:04

## 2017-06-07 RX ADMIN — INSULIN LISPRO 15 UNITS: 100 INJECTION, SOLUTION INTRAVENOUS; SUBCUTANEOUS at 08:41

## 2017-06-07 RX ADMIN — ASPIRIN 81 MG 81 MG: 81 TABLET ORAL at 08:45

## 2017-06-07 RX ADMIN — VANCOMYCIN HYDROCHLORIDE 1000 MG: 1 INJECTION, SOLUTION INTRAVENOUS at 12:41

## 2017-06-07 RX ADMIN — LEVALBUTEROL HYDROCHLORIDE 1.25 MG: 1.25 SOLUTION, CONCENTRATE RESPIRATORY (INHALATION) at 01:43

## 2017-06-07 RX ADMIN — BENZONATATE 100 MG: 100 CAPSULE ORAL at 16:20

## 2017-06-07 RX ADMIN — HYDROCODONE POLISTIREX AND CHLORPHENIRAMINE POLISTIREX 5 ML: 10; 8 SUSPENSION, EXTENDED RELEASE ORAL at 22:26

## 2017-06-07 RX ADMIN — MIRTAZAPINE 30 MG: 15 TABLET, FILM COATED ORAL at 22:25

## 2017-06-07 RX ADMIN — FLUOXETINE 40 MG: 20 CAPSULE ORAL at 08:46

## 2017-06-07 RX ADMIN — METHYLPREDNISOLONE SODIUM SUCCINATE 40 MG: 40 INJECTION, POWDER, FOR SOLUTION INTRAMUSCULAR; INTRAVENOUS at 22:26

## 2017-06-07 RX ADMIN — FUROSEMIDE 40 MG: 10 INJECTION, SOLUTION INTRAMUSCULAR; INTRAVENOUS at 16:20

## 2017-06-07 RX ADMIN — HEPARIN SODIUM 5000 UNITS: 5000 INJECTION, SOLUTION INTRAVENOUS; SUBCUTANEOUS at 05:04

## 2017-06-07 RX ADMIN — LISINOPRIL 5 MG: 5 TABLET ORAL at 08:46

## 2017-06-07 RX ADMIN — INSULIN GLARGINE 25 UNITS: 100 INJECTION, SOLUTION SUBCUTANEOUS at 22:25

## 2017-06-07 RX ADMIN — INSULIN LISPRO 3 UNITS: 100 INJECTION, SOLUTION INTRAVENOUS; SUBCUTANEOUS at 08:39

## 2017-06-07 RX ADMIN — LEVALBUTEROL HYDROCHLORIDE 1.25 MG: 1.25 SOLUTION, CONCENTRATE RESPIRATORY (INHALATION) at 13:21

## 2017-06-07 RX ADMIN — DICYCLOMINE HYDROCHLORIDE 10 MG: 10 CAPSULE ORAL at 18:38

## 2017-06-07 RX ADMIN — INSULIN LISPRO 2 UNITS: 100 INJECTION, SOLUTION INTRAVENOUS; SUBCUTANEOUS at 22:29

## 2017-06-07 RX ADMIN — IPRATROPIUM BROMIDE 0.5 MG: 0.5 SOLUTION RESPIRATORY (INHALATION) at 07:39

## 2017-06-07 RX ADMIN — FLUTICASONE PROPIONATE AND SALMETEROL 1 PUFF: 50; 500 POWDER RESPIRATORY (INHALATION) at 08:54

## 2017-06-07 RX ADMIN — INSULIN LISPRO 15 UNITS: 100 INJECTION, SOLUTION INTRAVENOUS; SUBCUTANEOUS at 18:38

## 2017-06-07 RX ADMIN — IPRATROPIUM BROMIDE 0.5 MG: 0.5 SOLUTION RESPIRATORY (INHALATION) at 01:43

## 2017-06-07 RX ADMIN — GUAIFENESIN 1200 MG: 600 TABLET, EXTENDED RELEASE ORAL at 22:26

## 2017-06-07 RX ADMIN — PANCRELIPASE 6000 UNITS: 30000; 6000; 19000 CAPSULE, DELAYED RELEASE PELLETS ORAL at 16:25

## 2017-06-07 RX ADMIN — CEFEPIME HYDROCHLORIDE 2000 MG: 2 INJECTION, POWDER, FOR SOLUTION INTRAVENOUS at 22:26

## 2017-06-07 RX ADMIN — IOHEXOL 85 ML: 350 INJECTION, SOLUTION INTRAVENOUS at 11:09

## 2017-06-07 RX ADMIN — ACETAMINOPHEN 650 MG: 325 TABLET, FILM COATED ORAL at 16:20

## 2017-06-07 RX ADMIN — INSULIN LISPRO 15 UNITS: 100 INJECTION, SOLUTION INTRAVENOUS; SUBCUTANEOUS at 12:38

## 2017-06-07 RX ADMIN — DICYCLOMINE HYDROCHLORIDE 10 MG: 10 CAPSULE ORAL at 08:45

## 2017-06-07 RX ADMIN — INSULIN LISPRO 2 UNITS: 100 INJECTION, SOLUTION INTRAVENOUS; SUBCUTANEOUS at 12:38

## 2017-06-07 RX ADMIN — BENZONATATE 100 MG: 100 CAPSULE ORAL at 08:46

## 2017-06-07 RX ADMIN — LEVALBUTEROL HYDROCHLORIDE 1.25 MG: 1.25 SOLUTION, CONCENTRATE RESPIRATORY (INHALATION) at 07:39

## 2017-06-07 RX ADMIN — HEPARIN SODIUM 5000 UNITS: 5000 INJECTION, SOLUTION INTRAVENOUS; SUBCUTANEOUS at 13:55

## 2017-06-07 RX ADMIN — HYDROCODONE POLISTIREX AND CHLORPHENIRAMINE POLISTIREX 5 ML: 10; 8 SUSPENSION, EXTENDED RELEASE ORAL at 10:13

## 2017-06-07 RX ADMIN — LEVALBUTEROL HYDROCHLORIDE 1.25 MG: 1.25 SOLUTION, CONCENTRATE RESPIRATORY (INHALATION) at 19:38

## 2017-06-07 RX ADMIN — FLUTICASONE PROPIONATE AND SALMETEROL 1 PUFF: 50; 500 POWDER RESPIRATORY (INHALATION) at 22:30

## 2017-06-07 RX ADMIN — METHYLPREDNISOLONE SODIUM SUCCINATE 40 MG: 40 INJECTION, POWDER, FOR SOLUTION INTRAMUSCULAR; INTRAVENOUS at 13:55

## 2017-06-07 RX ADMIN — GABAPENTIN 800 MG: 400 CAPSULE ORAL at 16:20

## 2017-06-07 RX ADMIN — TAMSULOSIN HYDROCHLORIDE 0.4 MG: 0.4 CAPSULE ORAL at 16:20

## 2017-06-07 RX ADMIN — IPRATROPIUM BROMIDE 0.5 MG: 0.5 SOLUTION RESPIRATORY (INHALATION) at 13:21

## 2017-06-07 RX ADMIN — BENZONATATE 100 MG: 100 CAPSULE ORAL at 22:26

## 2017-06-07 RX ADMIN — HEPARIN SODIUM 5000 UNITS: 5000 INJECTION, SOLUTION INTRAVENOUS; SUBCUTANEOUS at 22:26

## 2017-06-07 RX ADMIN — PANTOPRAZOLE SODIUM 40 MG: 40 TABLET, DELAYED RELEASE ORAL at 05:04

## 2017-06-07 RX ADMIN — PANCRELIPASE 6000 UNITS: 30000; 6000; 19000 CAPSULE, DELAYED RELEASE PELLETS ORAL at 12:39

## 2017-06-07 RX ADMIN — IPRATROPIUM BROMIDE 0.5 MG: 0.5 SOLUTION RESPIRATORY (INHALATION) at 19:38

## 2017-06-07 RX ADMIN — PANCRELIPASE 6000 UNITS: 30000; 6000; 19000 CAPSULE, DELAYED RELEASE PELLETS ORAL at 08:49

## 2017-06-07 RX ADMIN — CEFEPIME HYDROCHLORIDE 2000 MG: 2 INJECTION, POWDER, FOR SOLUTION INTRAVENOUS at 11:26

## 2017-06-07 RX ADMIN — GUAIFENESIN 1200 MG: 600 TABLET, EXTENDED RELEASE ORAL at 08:48

## 2017-06-07 RX ADMIN — GABAPENTIN 800 MG: 400 CAPSULE ORAL at 08:46

## 2017-06-07 RX ADMIN — GABAPENTIN 800 MG: 400 CAPSULE ORAL at 22:25

## 2017-06-07 RX ADMIN — TRAZODONE HYDROCHLORIDE 50 MG: 50 TABLET ORAL at 22:27

## 2017-06-08 ENCOUNTER — APPOINTMENT (INPATIENT)
Dept: PHYSICAL THERAPY | Facility: HOSPITAL | Age: 62
DRG: 133 | End: 2017-06-08
Payer: COMMERCIAL

## 2017-06-08 ENCOUNTER — APPOINTMENT (INPATIENT)
Dept: NON INVASIVE DIAGNOSTICS | Facility: HOSPITAL | Age: 62
DRG: 133 | End: 2017-06-08
Payer: COMMERCIAL

## 2017-06-08 LAB
ANION GAP SERPL CALCULATED.3IONS-SCNC: 10 MMOL/L (ref 4–13)
BACTERIA UR QL AUTO: ABNORMAL /HPF
BILIRUB UR QL STRIP: NEGATIVE
BUN SERPL-MCNC: 23 MG/DL (ref 5–25)
CALCIUM SERPL-MCNC: 9 MG/DL (ref 8.3–10.1)
CHLORIDE SERPL-SCNC: 102 MMOL/L (ref 100–108)
CLARITY UR: ABNORMAL
CO2 SERPL-SCNC: 27 MMOL/L (ref 21–32)
COLOR UR: YELLOW
CREAT SERPL-MCNC: 1.15 MG/DL (ref 0.6–1.3)
ERYTHROCYTE [DISTWIDTH] IN BLOOD BY AUTOMATED COUNT: 15.2 % (ref 11.6–15.1)
GFR SERPL CREATININE-BSD FRML MDRD: 47.7 ML/MIN/1.73SQ M
GLUCOSE SERPL-MCNC: 149 MG/DL (ref 65–140)
GLUCOSE SERPL-MCNC: 239 MG/DL (ref 65–140)
GLUCOSE SERPL-MCNC: 290 MG/DL (ref 65–140)
GLUCOSE SERPL-MCNC: 292 MG/DL (ref 65–140)
GLUCOSE SERPL-MCNC: 317 MG/DL (ref 65–140)
GLUCOSE UR STRIP-MCNC: NEGATIVE MG/DL
HCT VFR BLD AUTO: 29 % (ref 34.8–46.1)
HGB BLD-MCNC: 9.1 G/DL (ref 11.5–15.4)
HGB UR QL STRIP.AUTO: ABNORMAL
KETONES UR STRIP-MCNC: NEGATIVE MG/DL
LEUKOCYTE ESTERASE UR QL STRIP: ABNORMAL
MCH RBC QN AUTO: 27.5 PG (ref 26.8–34.3)
MCHC RBC AUTO-ENTMCNC: 31.4 G/DL (ref 31.4–37.4)
MCV RBC AUTO: 88 FL (ref 82–98)
NITRITE UR QL STRIP: NEGATIVE
NON-SQ EPI CELLS URNS QL MICRO: ABNORMAL /HPF
PH UR STRIP.AUTO: 6 [PH] (ref 4.5–8)
PLATELET # BLD AUTO: 219 THOUSANDS/UL (ref 149–390)
PMV BLD AUTO: 11.5 FL (ref 8.9–12.7)
POTASSIUM SERPL-SCNC: 4.3 MMOL/L (ref 3.5–5.3)
PROT UR STRIP-MCNC: ABNORMAL MG/DL
RBC # BLD AUTO: 3.31 MILLION/UL (ref 3.81–5.12)
RBC #/AREA URNS AUTO: ABNORMAL /HPF
SODIUM SERPL-SCNC: 139 MMOL/L (ref 136–145)
SP GR UR STRIP.AUTO: 1.01 (ref 1–1.03)
UROBILINOGEN UR QL STRIP.AUTO: 0.2 E.U./DL
WBC # BLD AUTO: 9.49 THOUSAND/UL (ref 4.31–10.16)
WBC #/AREA URNS AUTO: ABNORMAL /HPF

## 2017-06-08 PROCEDURE — 80048 BASIC METABOLIC PNL TOTAL CA: CPT | Performed by: PHYSICIAN ASSISTANT

## 2017-06-08 PROCEDURE — 82948 REAGENT STRIP/BLOOD GLUCOSE: CPT

## 2017-06-08 PROCEDURE — 94668 MNPJ CHEST WALL SBSQ: CPT

## 2017-06-08 PROCEDURE — 81001 URINALYSIS AUTO W/SCOPE: CPT | Performed by: INTERNAL MEDICINE

## 2017-06-08 PROCEDURE — 94660 CPAP INITIATION&MGMT: CPT

## 2017-06-08 PROCEDURE — 85027 COMPLETE CBC AUTOMATED: CPT | Performed by: PHYSICIAN ASSISTANT

## 2017-06-08 PROCEDURE — 93306 TTE W/DOPPLER COMPLETE: CPT

## 2017-06-08 PROCEDURE — 94760 N-INVAS EAR/PLS OXIMETRY 1: CPT

## 2017-06-08 PROCEDURE — 94762 N-INVAS EAR/PLS OXIMTRY CONT: CPT

## 2017-06-08 PROCEDURE — 87086 URINE CULTURE/COLONY COUNT: CPT | Performed by: INTERNAL MEDICINE

## 2017-06-08 PROCEDURE — 94640 AIRWAY INHALATION TREATMENT: CPT

## 2017-06-08 RX ORDER — HEPARIN SODIUM,PORCINE 10 UNIT/ML
10 VIAL (ML) INTRAVENOUS AS NEEDED
Status: DISCONTINUED | OUTPATIENT
Start: 2017-06-08 | End: 2017-06-08

## 2017-06-08 RX ORDER — HEPARIN SODIUM,PORCINE 10 UNIT/ML
3 VIAL (ML) INTRAVENOUS
Status: DISCONTINUED | OUTPATIENT
Start: 2017-06-08 | End: 2017-06-08

## 2017-06-08 RX ORDER — FUROSEMIDE 10 MG/ML
20 INJECTION INTRAMUSCULAR; INTRAVENOUS ONCE
Status: COMPLETED | OUTPATIENT
Start: 2017-06-08 | End: 2017-06-08

## 2017-06-08 RX ORDER — INSULIN GLARGINE 100 [IU]/ML
30 INJECTION, SOLUTION SUBCUTANEOUS
Status: DISCONTINUED | OUTPATIENT
Start: 2017-06-08 | End: 2017-06-10

## 2017-06-08 RX ADMIN — BENZONATATE 100 MG: 100 CAPSULE ORAL at 08:07

## 2017-06-08 RX ADMIN — FLUTICASONE PROPIONATE AND SALMETEROL 1 PUFF: 50; 500 POWDER RESPIRATORY (INHALATION) at 08:09

## 2017-06-08 RX ADMIN — METHYLPREDNISOLONE SODIUM SUCCINATE 40 MG: 40 INJECTION, POWDER, FOR SOLUTION INTRAMUSCULAR; INTRAVENOUS at 22:10

## 2017-06-08 RX ADMIN — LISINOPRIL 5 MG: 5 TABLET ORAL at 08:07

## 2017-06-08 RX ADMIN — GABAPENTIN 800 MG: 400 CAPSULE ORAL at 08:07

## 2017-06-08 RX ADMIN — MIRTAZAPINE 30 MG: 15 TABLET, FILM COATED ORAL at 22:11

## 2017-06-08 RX ADMIN — DICYCLOMINE HYDROCHLORIDE 10 MG: 10 CAPSULE ORAL at 17:29

## 2017-06-08 RX ADMIN — IPRATROPIUM BROMIDE 0.5 MG: 0.5 SOLUTION RESPIRATORY (INHALATION) at 03:00

## 2017-06-08 RX ADMIN — LEVALBUTEROL HYDROCHLORIDE 1.25 MG: 1.25 SOLUTION, CONCENTRATE RESPIRATORY (INHALATION) at 03:00

## 2017-06-08 RX ADMIN — PANCRELIPASE 6000 UNITS: 30000; 6000; 19000 CAPSULE, DELAYED RELEASE PELLETS ORAL at 08:08

## 2017-06-08 RX ADMIN — HEPARIN SODIUM 5000 UNITS: 5000 INJECTION, SOLUTION INTRAVENOUS; SUBCUTANEOUS at 22:10

## 2017-06-08 RX ADMIN — GUAIFENESIN 1200 MG: 600 TABLET, EXTENDED RELEASE ORAL at 22:11

## 2017-06-08 RX ADMIN — INSULIN GLARGINE 30 UNITS: 100 INJECTION, SOLUTION SUBCUTANEOUS at 22:10

## 2017-06-08 RX ADMIN — HEPARIN 100 UNITS: 100 SYRINGE at 22:25

## 2017-06-08 RX ADMIN — INSULIN LISPRO 4 UNITS: 100 INJECTION, SOLUTION INTRAVENOUS; SUBCUTANEOUS at 12:30

## 2017-06-08 RX ADMIN — TAMSULOSIN HYDROCHLORIDE 0.4 MG: 0.4 CAPSULE ORAL at 15:57

## 2017-06-08 RX ADMIN — HEPARIN SODIUM 5000 UNITS: 5000 INJECTION, SOLUTION INTRAVENOUS; SUBCUTANEOUS at 14:15

## 2017-06-08 RX ADMIN — GABAPENTIN 800 MG: 400 CAPSULE ORAL at 15:57

## 2017-06-08 RX ADMIN — PANCRELIPASE 6000 UNITS: 30000; 6000; 19000 CAPSULE, DELAYED RELEASE PELLETS ORAL at 15:58

## 2017-06-08 RX ADMIN — ASPIRIN 81 MG 81 MG: 81 TABLET ORAL at 08:06

## 2017-06-08 RX ADMIN — INSULIN LISPRO 15 UNITS: 100 INJECTION, SOLUTION INTRAVENOUS; SUBCUTANEOUS at 12:31

## 2017-06-08 RX ADMIN — IPRATROPIUM BROMIDE 0.5 MG: 0.5 SOLUTION RESPIRATORY (INHALATION) at 08:29

## 2017-06-08 RX ADMIN — DICYCLOMINE HYDROCHLORIDE 10 MG: 10 CAPSULE ORAL at 08:07

## 2017-06-08 RX ADMIN — HEPARIN SODIUM 5000 UNITS: 5000 INJECTION, SOLUTION INTRAVENOUS; SUBCUTANEOUS at 05:56

## 2017-06-08 RX ADMIN — CEFEPIME HYDROCHLORIDE 2000 MG: 2 INJECTION, POWDER, FOR SOLUTION INTRAVENOUS at 22:12

## 2017-06-08 RX ADMIN — LEVALBUTEROL HYDROCHLORIDE 1.25 MG: 1.25 SOLUTION, CONCENTRATE RESPIRATORY (INHALATION) at 19:19

## 2017-06-08 RX ADMIN — LEVALBUTEROL HYDROCHLORIDE 1.25 MG: 1.25 SOLUTION, CONCENTRATE RESPIRATORY (INHALATION) at 08:29

## 2017-06-08 RX ADMIN — SODIUM CHLORIDE, PRESERVATIVE FREE 100 UNITS: 5 INJECTION INTRAVENOUS at 12:32

## 2017-06-08 RX ADMIN — CEFEPIME HYDROCHLORIDE 2000 MG: 2 INJECTION, POWDER, FOR SOLUTION INTRAVENOUS at 10:25

## 2017-06-08 RX ADMIN — METHYLPREDNISOLONE SODIUM SUCCINATE 40 MG: 40 INJECTION, POWDER, FOR SOLUTION INTRAMUSCULAR; INTRAVENOUS at 05:56

## 2017-06-08 RX ADMIN — HYDROCODONE POLISTIREX AND CHLORPHENIRAMINE POLISTIREX 5 ML: 10; 8 SUSPENSION, EXTENDED RELEASE ORAL at 08:07

## 2017-06-08 RX ADMIN — FLUOXETINE 40 MG: 20 CAPSULE ORAL at 08:06

## 2017-06-08 RX ADMIN — IPRATROPIUM BROMIDE 0.5 MG: 0.5 SOLUTION RESPIRATORY (INHALATION) at 19:19

## 2017-06-08 RX ADMIN — INSULIN LISPRO 15 UNITS: 100 INJECTION, SOLUTION INTRAVENOUS; SUBCUTANEOUS at 09:33

## 2017-06-08 RX ADMIN — GUAIFENESIN 1200 MG: 600 TABLET, EXTENDED RELEASE ORAL at 08:06

## 2017-06-08 RX ADMIN — GABAPENTIN 800 MG: 400 CAPSULE ORAL at 22:11

## 2017-06-08 RX ADMIN — INSULIN LISPRO 5 UNITS: 100 INJECTION, SOLUTION INTRAVENOUS; SUBCUTANEOUS at 17:28

## 2017-06-08 RX ADMIN — TRAZODONE HYDROCHLORIDE 50 MG: 50 TABLET ORAL at 22:11

## 2017-06-08 RX ADMIN — FLUTICASONE PROPIONATE AND SALMETEROL 1 PUFF: 50; 500 POWDER RESPIRATORY (INHALATION) at 22:20

## 2017-06-08 RX ADMIN — LEVALBUTEROL HYDROCHLORIDE 1.25 MG: 1.25 SOLUTION, CONCENTRATE RESPIRATORY (INHALATION) at 14:32

## 2017-06-08 RX ADMIN — HYDROCODONE POLISTIREX AND CHLORPHENIRAMINE POLISTIREX 5 ML: 10; 8 SUSPENSION, EXTENDED RELEASE ORAL at 22:10

## 2017-06-08 RX ADMIN — INSULIN LISPRO 3 UNITS: 100 INJECTION, SOLUTION INTRAVENOUS; SUBCUTANEOUS at 09:33

## 2017-06-08 RX ADMIN — PANTOPRAZOLE SODIUM 40 MG: 40 TABLET, DELAYED RELEASE ORAL at 05:56

## 2017-06-08 RX ADMIN — METHYLPREDNISOLONE SODIUM SUCCINATE 40 MG: 40 INJECTION, POWDER, FOR SOLUTION INTRAMUSCULAR; INTRAVENOUS at 14:15

## 2017-06-08 RX ADMIN — BENZONATATE 100 MG: 100 CAPSULE ORAL at 22:11

## 2017-06-08 RX ADMIN — FUROSEMIDE 20 MG: 10 INJECTION, SOLUTION INTRAMUSCULAR; INTRAVENOUS at 15:56

## 2017-06-08 RX ADMIN — BENZONATATE 100 MG: 100 CAPSULE ORAL at 15:57

## 2017-06-08 RX ADMIN — PANCRELIPASE 6000 UNITS: 30000; 6000; 19000 CAPSULE, DELAYED RELEASE PELLETS ORAL at 12:31

## 2017-06-08 RX ADMIN — IPRATROPIUM BROMIDE 0.5 MG: 0.5 SOLUTION RESPIRATORY (INHALATION) at 14:32

## 2017-06-09 ENCOUNTER — APPOINTMENT (INPATIENT)
Dept: PHYSICAL THERAPY | Facility: HOSPITAL | Age: 62
DRG: 133 | End: 2017-06-09
Payer: COMMERCIAL

## 2017-06-09 LAB
ANION GAP SERPL CALCULATED.3IONS-SCNC: 6 MMOL/L (ref 4–13)
BASOPHILS # BLD AUTO: 0.01 THOUSANDS/ΜL (ref 0–0.1)
BASOPHILS NFR BLD AUTO: 0 % (ref 0–1)
BUN SERPL-MCNC: 24 MG/DL (ref 5–25)
CALCIUM SERPL-MCNC: 8.8 MG/DL (ref 8.3–10.1)
CHLORIDE SERPL-SCNC: 98 MMOL/L (ref 100–108)
CO2 SERPL-SCNC: 31 MMOL/L (ref 21–32)
CREAT SERPL-MCNC: 0.95 MG/DL (ref 0.6–1.3)
EOSINOPHIL # BLD AUTO: 0.04 THOUSAND/ΜL (ref 0–0.61)
EOSINOPHIL NFR BLD AUTO: 1 % (ref 0–6)
ERYTHROCYTE [DISTWIDTH] IN BLOOD BY AUTOMATED COUNT: 14.9 % (ref 11.6–15.1)
GFR SERPL CREATININE-BSD FRML MDRD: 59.4 ML/MIN/1.73SQ M
GLUCOSE SERPL-MCNC: 114 MG/DL (ref 65–140)
GLUCOSE SERPL-MCNC: 128 MG/DL (ref 65–140)
GLUCOSE SERPL-MCNC: 237 MG/DL (ref 65–140)
GLUCOSE SERPL-MCNC: 288 MG/DL (ref 65–140)
GLUCOSE SERPL-MCNC: 323 MG/DL (ref 65–140)
GLUCOSE SERPL-MCNC: 539 MG/DL (ref 65–140)
GLUCOSE SERPL-MCNC: 68 MG/DL (ref 65–140)
GLUCOSE SERPL-MCNC: >500 MG/DL (ref 65–140)
GLUCOSE SERPL-MCNC: >500 MG/DL (ref 65–140)
HCT VFR BLD AUTO: 27.8 % (ref 34.8–46.1)
HGB BLD-MCNC: 8.6 G/DL (ref 11.5–15.4)
LYMPHOCYTES # BLD AUTO: 0.79 THOUSANDS/ΜL (ref 0.6–4.47)
LYMPHOCYTES NFR BLD AUTO: 12 % (ref 14–44)
MCH RBC QN AUTO: 27.2 PG (ref 26.8–34.3)
MCHC RBC AUTO-ENTMCNC: 30.9 G/DL (ref 31.4–37.4)
MCV RBC AUTO: 88 FL (ref 82–98)
MONOCYTES # BLD AUTO: 0.34 THOUSAND/ΜL (ref 0.17–1.22)
MONOCYTES NFR BLD AUTO: 5 % (ref 4–12)
NEUTROPHILS # BLD AUTO: 5.62 THOUSANDS/ΜL (ref 1.85–7.62)
NEUTS SEG NFR BLD AUTO: 82 % (ref 43–75)
PLATELET # BLD AUTO: 224 THOUSANDS/UL (ref 149–390)
PMV BLD AUTO: 11.2 FL (ref 8.9–12.7)
POTASSIUM SERPL-SCNC: 4.4 MMOL/L (ref 3.5–5.3)
RBC # BLD AUTO: 3.16 MILLION/UL (ref 3.81–5.12)
SODIUM SERPL-SCNC: 135 MMOL/L (ref 136–145)
WBC # BLD AUTO: 6.8 THOUSAND/UL (ref 4.31–10.16)

## 2017-06-09 PROCEDURE — 82947 ASSAY GLUCOSE BLOOD QUANT: CPT | Performed by: PHYSICIAN ASSISTANT

## 2017-06-09 PROCEDURE — 82948 REAGENT STRIP/BLOOD GLUCOSE: CPT

## 2017-06-09 PROCEDURE — 94668 MNPJ CHEST WALL SBSQ: CPT

## 2017-06-09 PROCEDURE — 94640 AIRWAY INHALATION TREATMENT: CPT

## 2017-06-09 PROCEDURE — 97116 GAIT TRAINING THERAPY: CPT

## 2017-06-09 PROCEDURE — 85025 COMPLETE CBC W/AUTO DIFF WBC: CPT | Performed by: PHYSICIAN ASSISTANT

## 2017-06-09 PROCEDURE — 94660 CPAP INITIATION&MGMT: CPT

## 2017-06-09 PROCEDURE — 94760 N-INVAS EAR/PLS OXIMETRY 1: CPT

## 2017-06-09 PROCEDURE — 80048 BASIC METABOLIC PNL TOTAL CA: CPT | Performed by: PHYSICIAN ASSISTANT

## 2017-06-09 PROCEDURE — 94762 N-INVAS EAR/PLS OXIMTRY CONT: CPT

## 2017-06-09 RX ORDER — METHYLPREDNISOLONE SODIUM SUCCINATE 40 MG/ML
40 INJECTION, POWDER, LYOPHILIZED, FOR SOLUTION INTRAMUSCULAR; INTRAVENOUS EVERY 12 HOURS SCHEDULED
Status: DISCONTINUED | OUTPATIENT
Start: 2017-06-09 | End: 2017-06-10

## 2017-06-09 RX ORDER — FUROSEMIDE 10 MG/ML
20 INJECTION INTRAMUSCULAR; INTRAVENOUS ONCE
Status: COMPLETED | OUTPATIENT
Start: 2017-06-09 | End: 2017-06-09

## 2017-06-09 RX ADMIN — DICYCLOMINE HYDROCHLORIDE 10 MG: 10 CAPSULE ORAL at 08:52

## 2017-06-09 RX ADMIN — LEVALBUTEROL HYDROCHLORIDE 1.25 MG: 1.25 SOLUTION, CONCENTRATE RESPIRATORY (INHALATION) at 01:36

## 2017-06-09 RX ADMIN — HEPARIN SODIUM 5000 UNITS: 5000 INJECTION, SOLUTION INTRAVENOUS; SUBCUTANEOUS at 22:35

## 2017-06-09 RX ADMIN — DICYCLOMINE HYDROCHLORIDE 10 MG: 10 CAPSULE ORAL at 17:41

## 2017-06-09 RX ADMIN — PANCRELIPASE 6000 UNITS: 30000; 6000; 19000 CAPSULE, DELAYED RELEASE PELLETS ORAL at 12:56

## 2017-06-09 RX ADMIN — FLUTICASONE PROPIONATE AND SALMETEROL 1 PUFF: 50; 500 POWDER RESPIRATORY (INHALATION) at 22:35

## 2017-06-09 RX ADMIN — IPRATROPIUM BROMIDE 0.5 MG: 0.5 SOLUTION RESPIRATORY (INHALATION) at 01:36

## 2017-06-09 RX ADMIN — METHYLPREDNISOLONE SODIUM SUCCINATE 40 MG: 40 INJECTION, POWDER, FOR SOLUTION INTRAMUSCULAR; INTRAVENOUS at 22:33

## 2017-06-09 RX ADMIN — HEPARIN SODIUM 5000 UNITS: 5000 INJECTION, SOLUTION INTRAVENOUS; SUBCUTANEOUS at 06:00

## 2017-06-09 RX ADMIN — INSULIN HUMAN 6 UNITS: 100 INJECTION, SOLUTION PARENTERAL at 11:22

## 2017-06-09 RX ADMIN — IPRATROPIUM BROMIDE 0.5 MG: 0.5 SOLUTION RESPIRATORY (INHALATION) at 14:38

## 2017-06-09 RX ADMIN — PANCRELIPASE 6000 UNITS: 30000; 6000; 19000 CAPSULE, DELAYED RELEASE PELLETS ORAL at 08:54

## 2017-06-09 RX ADMIN — LISINOPRIL 5 MG: 5 TABLET ORAL at 08:52

## 2017-06-09 RX ADMIN — TRAZODONE HYDROCHLORIDE 50 MG: 50 TABLET ORAL at 22:32

## 2017-06-09 RX ADMIN — BENZONATATE 100 MG: 100 CAPSULE ORAL at 08:52

## 2017-06-09 RX ADMIN — INSULIN GLARGINE 30 UNITS: 100 INJECTION, SOLUTION SUBCUTANEOUS at 22:33

## 2017-06-09 RX ADMIN — GABAPENTIN 800 MG: 400 CAPSULE ORAL at 17:41

## 2017-06-09 RX ADMIN — FLUOXETINE 40 MG: 20 CAPSULE ORAL at 08:52

## 2017-06-09 RX ADMIN — LEVALBUTEROL HYDROCHLORIDE 1.25 MG: 1.25 SOLUTION, CONCENTRATE RESPIRATORY (INHALATION) at 07:56

## 2017-06-09 RX ADMIN — GUAIFENESIN 1200 MG: 600 TABLET, EXTENDED RELEASE ORAL at 22:33

## 2017-06-09 RX ADMIN — GABAPENTIN 800 MG: 400 CAPSULE ORAL at 08:52

## 2017-06-09 RX ADMIN — TAMSULOSIN HYDROCHLORIDE 0.4 MG: 0.4 CAPSULE ORAL at 17:41

## 2017-06-09 RX ADMIN — LEVALBUTEROL HYDROCHLORIDE 1.25 MG: 1.25 SOLUTION, CONCENTRATE RESPIRATORY (INHALATION) at 19:34

## 2017-06-09 RX ADMIN — FUROSEMIDE 20 MG: 10 INJECTION, SOLUTION INTRAMUSCULAR; INTRAVENOUS at 11:29

## 2017-06-09 RX ADMIN — HEPARIN 100 UNITS: 100 SYRINGE at 06:57

## 2017-06-09 RX ADMIN — ASPIRIN 81 MG 81 MG: 81 TABLET ORAL at 08:52

## 2017-06-09 RX ADMIN — PANCRELIPASE 6000 UNITS: 30000; 6000; 19000 CAPSULE, DELAYED RELEASE PELLETS ORAL at 17:42

## 2017-06-09 RX ADMIN — CEFEPIME HYDROCHLORIDE 2000 MG: 2 INJECTION, POWDER, FOR SOLUTION INTRAVENOUS at 10:31

## 2017-06-09 RX ADMIN — PANTOPRAZOLE SODIUM 40 MG: 40 TABLET, DELAYED RELEASE ORAL at 06:00

## 2017-06-09 RX ADMIN — BENZONATATE 100 MG: 100 CAPSULE ORAL at 17:42

## 2017-06-09 RX ADMIN — BENZONATATE 100 MG: 100 CAPSULE ORAL at 22:32

## 2017-06-09 RX ADMIN — INSULIN LISPRO 6 UNITS: 100 INJECTION, SOLUTION INTRAVENOUS; SUBCUTANEOUS at 11:16

## 2017-06-09 RX ADMIN — HYDROCODONE POLISTIREX AND CHLORPHENIRAMINE POLISTIREX 5 ML: 10; 8 SUSPENSION, EXTENDED RELEASE ORAL at 22:47

## 2017-06-09 RX ADMIN — IPRATROPIUM BROMIDE 0.5 MG: 0.5 SOLUTION RESPIRATORY (INHALATION) at 07:56

## 2017-06-09 RX ADMIN — FLUTICASONE PROPIONATE AND SALMETEROL 1 PUFF: 50; 500 POWDER RESPIRATORY (INHALATION) at 08:52

## 2017-06-09 RX ADMIN — INSULIN LISPRO 4 UNITS: 100 INJECTION, SOLUTION INTRAVENOUS; SUBCUTANEOUS at 08:53

## 2017-06-09 RX ADMIN — GUAIFENESIN 1200 MG: 600 TABLET, EXTENDED RELEASE ORAL at 08:52

## 2017-06-09 RX ADMIN — MIRTAZAPINE 30 MG: 15 TABLET, FILM COATED ORAL at 22:33

## 2017-06-09 RX ADMIN — METHYLPREDNISOLONE SODIUM SUCCINATE 40 MG: 40 INJECTION, POWDER, FOR SOLUTION INTRAMUSCULAR; INTRAVENOUS at 06:00

## 2017-06-09 RX ADMIN — CEFEPIME HYDROCHLORIDE 2000 MG: 2 INJECTION, POWDER, FOR SOLUTION INTRAVENOUS at 22:47

## 2017-06-09 RX ADMIN — GABAPENTIN 800 MG: 400 CAPSULE ORAL at 22:32

## 2017-06-09 RX ADMIN — HYDROCODONE POLISTIREX AND CHLORPHENIRAMINE POLISTIREX 5 ML: 10; 8 SUSPENSION, EXTENDED RELEASE ORAL at 08:54

## 2017-06-09 RX ADMIN — IPRATROPIUM BROMIDE 0.5 MG: 0.5 SOLUTION RESPIRATORY (INHALATION) at 19:34

## 2017-06-09 RX ADMIN — LEVALBUTEROL HYDROCHLORIDE 1.25 MG: 1.25 SOLUTION, CONCENTRATE RESPIRATORY (INHALATION) at 14:38

## 2017-06-10 LAB
BACTERIA BLD CULT: NORMAL
BACTERIA BLD CULT: NORMAL
BACTERIA UR CULT: NORMAL
GLUCOSE SERPL-MCNC: 297 MG/DL (ref 65–140)
GLUCOSE SERPL-MCNC: 340 MG/DL (ref 65–140)
GLUCOSE SERPL-MCNC: 449 MG/DL (ref 65–140)
GLUCOSE SERPL-MCNC: 465 MG/DL (ref 65–140)

## 2017-06-10 PROCEDURE — 94760 N-INVAS EAR/PLS OXIMETRY 1: CPT

## 2017-06-10 PROCEDURE — 94668 MNPJ CHEST WALL SBSQ: CPT

## 2017-06-10 PROCEDURE — 82948 REAGENT STRIP/BLOOD GLUCOSE: CPT

## 2017-06-10 PROCEDURE — 94640 AIRWAY INHALATION TREATMENT: CPT

## 2017-06-10 RX ORDER — INSULIN GLARGINE 100 [IU]/ML
35 INJECTION, SOLUTION SUBCUTANEOUS
Status: DISCONTINUED | OUTPATIENT
Start: 2017-06-10 | End: 2017-06-11

## 2017-06-10 RX ORDER — FUROSEMIDE 10 MG/ML
20 INJECTION INTRAMUSCULAR; INTRAVENOUS ONCE
Status: COMPLETED | OUTPATIENT
Start: 2017-06-10 | End: 2017-06-10

## 2017-06-10 RX ORDER — PREDNISONE 20 MG/1
40 TABLET ORAL DAILY
Status: DISCONTINUED | OUTPATIENT
Start: 2017-06-10 | End: 2017-06-11 | Stop reason: HOSPADM

## 2017-06-10 RX ADMIN — LEVALBUTEROL HYDROCHLORIDE 1.25 MG: 1.25 SOLUTION, CONCENTRATE RESPIRATORY (INHALATION) at 19:21

## 2017-06-10 RX ADMIN — CEFEPIME HYDROCHLORIDE 2000 MG: 2 INJECTION, POWDER, FOR SOLUTION INTRAVENOUS at 10:43

## 2017-06-10 RX ADMIN — INSULIN LISPRO 5 UNITS: 100 INJECTION, SOLUTION INTRAVENOUS; SUBCUTANEOUS at 11:39

## 2017-06-10 RX ADMIN — BENZONATATE 100 MG: 100 CAPSULE ORAL at 08:39

## 2017-06-10 RX ADMIN — ASPIRIN 81 MG 81 MG: 81 TABLET ORAL at 08:39

## 2017-06-10 RX ADMIN — HEPARIN SODIUM 5000 UNITS: 5000 INJECTION, SOLUTION INTRAVENOUS; SUBCUTANEOUS at 05:47

## 2017-06-10 RX ADMIN — PANCRELIPASE 6000 UNITS: 30000; 6000; 19000 CAPSULE, DELAYED RELEASE PELLETS ORAL at 16:20

## 2017-06-10 RX ADMIN — DICYCLOMINE HYDROCHLORIDE 10 MG: 10 CAPSULE ORAL at 18:06

## 2017-06-10 RX ADMIN — CEFEPIME HYDROCHLORIDE 2000 MG: 2 INJECTION, POWDER, FOR SOLUTION INTRAVENOUS at 22:09

## 2017-06-10 RX ADMIN — PANCRELIPASE 6000 UNITS: 30000; 6000; 19000 CAPSULE, DELAYED RELEASE PELLETS ORAL at 11:38

## 2017-06-10 RX ADMIN — MIRTAZAPINE 30 MG: 15 TABLET, FILM COATED ORAL at 21:25

## 2017-06-10 RX ADMIN — FLUTICASONE PROPIONATE AND SALMETEROL 1 PUFF: 50; 500 POWDER RESPIRATORY (INHALATION) at 21:27

## 2017-06-10 RX ADMIN — GUAIFENESIN 1200 MG: 600 TABLET, EXTENDED RELEASE ORAL at 21:25

## 2017-06-10 RX ADMIN — HEPARIN SODIUM 5000 UNITS: 5000 INJECTION, SOLUTION INTRAVENOUS; SUBCUTANEOUS at 13:05

## 2017-06-10 RX ADMIN — FLUOXETINE 40 MG: 20 CAPSULE ORAL at 08:37

## 2017-06-10 RX ADMIN — INSULIN LISPRO 5 UNITS: 100 INJECTION, SOLUTION INTRAVENOUS; SUBCUTANEOUS at 21:27

## 2017-06-10 RX ADMIN — INSULIN LISPRO 4 UNITS: 100 INJECTION, SOLUTION INTRAVENOUS; SUBCUTANEOUS at 16:19

## 2017-06-10 RX ADMIN — GABAPENTIN 800 MG: 400 CAPSULE ORAL at 16:20

## 2017-06-10 RX ADMIN — FLUTICASONE PROPIONATE AND SALMETEROL 1 PUFF: 50; 500 POWDER RESPIRATORY (INHALATION) at 08:47

## 2017-06-10 RX ADMIN — INSULIN GLARGINE 35 UNITS: 100 INJECTION, SOLUTION SUBCUTANEOUS at 21:26

## 2017-06-10 RX ADMIN — PANCRELIPASE 6000 UNITS: 30000; 6000; 19000 CAPSULE, DELAYED RELEASE PELLETS ORAL at 08:40

## 2017-06-10 RX ADMIN — LEVALBUTEROL HYDROCHLORIDE 1.25 MG: 1.25 SOLUTION, CONCENTRATE RESPIRATORY (INHALATION) at 13:23

## 2017-06-10 RX ADMIN — METHYLPREDNISOLONE SODIUM SUCCINATE 40 MG: 40 INJECTION, POWDER, FOR SOLUTION INTRAMUSCULAR; INTRAVENOUS at 08:38

## 2017-06-10 RX ADMIN — FUROSEMIDE 20 MG: 10 INJECTION, SOLUTION INTRAMUSCULAR; INTRAVENOUS at 14:26

## 2017-06-10 RX ADMIN — IPRATROPIUM BROMIDE 0.5 MG: 0.5 SOLUTION RESPIRATORY (INHALATION) at 07:47

## 2017-06-10 RX ADMIN — BENZONATATE 100 MG: 100 CAPSULE ORAL at 16:20

## 2017-06-10 RX ADMIN — DICYCLOMINE HYDROCHLORIDE 10 MG: 10 CAPSULE ORAL at 08:52

## 2017-06-10 RX ADMIN — HYDROCODONE POLISTIREX AND CHLORPHENIRAMINE POLISTIREX 5 ML: 10; 8 SUSPENSION, EXTENDED RELEASE ORAL at 21:26

## 2017-06-10 RX ADMIN — HYDROCODONE POLISTIREX AND CHLORPHENIRAMINE POLISTIREX 5 ML: 10; 8 SUSPENSION, EXTENDED RELEASE ORAL at 08:51

## 2017-06-10 RX ADMIN — INSULIN LISPRO 6 UNITS: 100 INJECTION, SOLUTION INTRAVENOUS; SUBCUTANEOUS at 08:41

## 2017-06-10 RX ADMIN — IPRATROPIUM BROMIDE 0.5 MG: 0.5 SOLUTION RESPIRATORY (INHALATION) at 13:23

## 2017-06-10 RX ADMIN — TAMSULOSIN HYDROCHLORIDE 0.4 MG: 0.4 CAPSULE ORAL at 16:20

## 2017-06-10 RX ADMIN — GUAIFENESIN 1200 MG: 600 TABLET, EXTENDED RELEASE ORAL at 08:37

## 2017-06-10 RX ADMIN — HEPARIN SODIUM 5000 UNITS: 5000 INJECTION, SOLUTION INTRAVENOUS; SUBCUTANEOUS at 21:26

## 2017-06-10 RX ADMIN — TRAZODONE HYDROCHLORIDE 50 MG: 50 TABLET ORAL at 21:26

## 2017-06-10 RX ADMIN — PANTOPRAZOLE SODIUM 40 MG: 40 TABLET, DELAYED RELEASE ORAL at 05:47

## 2017-06-10 RX ADMIN — IPRATROPIUM BROMIDE 0.5 MG: 0.5 SOLUTION RESPIRATORY (INHALATION) at 19:21

## 2017-06-10 RX ADMIN — LEVALBUTEROL HYDROCHLORIDE 1.25 MG: 1.25 SOLUTION, CONCENTRATE RESPIRATORY (INHALATION) at 07:47

## 2017-06-10 RX ADMIN — GABAPENTIN 800 MG: 400 CAPSULE ORAL at 08:37

## 2017-06-10 RX ADMIN — LISINOPRIL 5 MG: 5 TABLET ORAL at 08:37

## 2017-06-10 RX ADMIN — PREDNISONE 40 MG: 20 TABLET ORAL at 13:04

## 2017-06-10 RX ADMIN — GABAPENTIN 800 MG: 400 CAPSULE ORAL at 21:26

## 2017-06-10 RX ADMIN — BENZONATATE 100 MG: 100 CAPSULE ORAL at 21:26

## 2017-06-11 VITALS
RESPIRATION RATE: 18 BRPM | DIASTOLIC BLOOD PRESSURE: 72 MMHG | WEIGHT: 129.85 LBS | TEMPERATURE: 98.3 F | HEART RATE: 77 BPM | HEIGHT: 63 IN | BODY MASS INDEX: 23.01 KG/M2 | SYSTOLIC BLOOD PRESSURE: 148 MMHG | OXYGEN SATURATION: 90 %

## 2017-06-11 LAB
ANION GAP SERPL CALCULATED.3IONS-SCNC: 7 MMOL/L (ref 4–13)
BUN SERPL-MCNC: 25 MG/DL (ref 5–25)
CALCIUM SERPL-MCNC: 9 MG/DL (ref 8.3–10.1)
CHLORIDE SERPL-SCNC: 95 MMOL/L (ref 100–108)
CO2 SERPL-SCNC: 29 MMOL/L (ref 21–32)
CREAT SERPL-MCNC: 1.02 MG/DL (ref 0.6–1.3)
ERYTHROCYTE [DISTWIDTH] IN BLOOD BY AUTOMATED COUNT: 14.9 % (ref 11.6–15.1)
GFR SERPL CREATININE-BSD FRML MDRD: 54.7 ML/MIN/1.73SQ M
GLUCOSE SERPL-MCNC: 273 MG/DL (ref 65–140)
GLUCOSE SERPL-MCNC: 286 MG/DL (ref 65–140)
GLUCOSE SERPL-MCNC: 410 MG/DL (ref 65–140)
GLUCOSE SERPL-MCNC: 448 MG/DL (ref 65–140)
HCT VFR BLD AUTO: 28.6 % (ref 34.8–46.1)
HGB BLD-MCNC: 8.7 G/DL (ref 11.5–15.4)
MCH RBC QN AUTO: 26.9 PG (ref 26.8–34.3)
MCHC RBC AUTO-ENTMCNC: 30.4 G/DL (ref 31.4–37.4)
MCV RBC AUTO: 89 FL (ref 82–98)
PLATELET # BLD AUTO: 227 THOUSANDS/UL (ref 149–390)
PMV BLD AUTO: 11.5 FL (ref 8.9–12.7)
POTASSIUM SERPL-SCNC: 4 MMOL/L (ref 3.5–5.3)
RBC # BLD AUTO: 3.23 MILLION/UL (ref 3.81–5.12)
SODIUM SERPL-SCNC: 131 MMOL/L (ref 136–145)
WBC # BLD AUTO: 10.95 THOUSAND/UL (ref 4.31–10.16)

## 2017-06-11 PROCEDURE — 80048 BASIC METABOLIC PNL TOTAL CA: CPT | Performed by: PHYSICIAN ASSISTANT

## 2017-06-11 PROCEDURE — 94668 MNPJ CHEST WALL SBSQ: CPT

## 2017-06-11 PROCEDURE — 94760 N-INVAS EAR/PLS OXIMETRY 1: CPT

## 2017-06-11 PROCEDURE — 82948 REAGENT STRIP/BLOOD GLUCOSE: CPT

## 2017-06-11 PROCEDURE — 94640 AIRWAY INHALATION TREATMENT: CPT

## 2017-06-11 PROCEDURE — 85027 COMPLETE CBC AUTOMATED: CPT | Performed by: PHYSICIAN ASSISTANT

## 2017-06-11 RX ORDER — GUAIFENESIN 100 MG/5ML
200 SYRUP ORAL 3 TIMES DAILY PRN
Qty: 120 ML | Refills: 0 | Status: SHIPPED | OUTPATIENT
Start: 2017-06-11 | End: 2017-06-21

## 2017-06-11 RX ORDER — INSULIN GLARGINE 100 [IU]/ML
40 INJECTION, SOLUTION SUBCUTANEOUS
Status: DISCONTINUED | OUTPATIENT
Start: 2017-06-11 | End: 2017-06-11 | Stop reason: HOSPADM

## 2017-06-11 RX ORDER — LEVALBUTEROL 1.25 MG/.5ML
SOLUTION, CONCENTRATE RESPIRATORY (INHALATION)
Status: DISCONTINUED
Start: 2017-06-11 | End: 2017-06-11 | Stop reason: WASHOUT

## 2017-06-11 RX ORDER — BENZONATATE 100 MG/1
100 CAPSULE ORAL 3 TIMES DAILY PRN
Qty: 20 CAPSULE | Refills: 0 | Status: SHIPPED | OUTPATIENT
Start: 2017-06-11

## 2017-06-11 RX ORDER — CEFUROXIME AXETIL 500 MG/1
500 TABLET ORAL EVERY 12 HOURS SCHEDULED
Qty: 2 TABLET | Refills: 0 | Status: SHIPPED | OUTPATIENT
Start: 2017-06-11 | End: 2017-06-12

## 2017-06-11 RX ORDER — PREDNISONE 10 MG/1
30 TABLET ORAL SEE ADMIN INSTRUCTIONS
Qty: 12 TABLET | Refills: 0 | Status: SHIPPED | OUTPATIENT
Start: 2017-06-12 | End: 2017-06-17

## 2017-06-11 RX ADMIN — GABAPENTIN 800 MG: 400 CAPSULE ORAL at 09:18

## 2017-06-11 RX ADMIN — CEFEPIME HYDROCHLORIDE 2000 MG: 2 INJECTION, POWDER, FOR SOLUTION INTRAVENOUS at 10:49

## 2017-06-11 RX ADMIN — FLUTICASONE PROPIONATE AND SALMETEROL 1 PUFF: 50; 500 POWDER RESPIRATORY (INHALATION) at 09:22

## 2017-06-11 RX ADMIN — INSULIN LISPRO 6 UNITS: 100 INJECTION, SOLUTION INTRAVENOUS; SUBCUTANEOUS at 08:02

## 2017-06-11 RX ADMIN — PANCRELIPASE 6000 UNITS: 30000; 6000; 19000 CAPSULE, DELAYED RELEASE PELLETS ORAL at 08:03

## 2017-06-11 RX ADMIN — DICYCLOMINE HYDROCHLORIDE 10 MG: 10 CAPSULE ORAL at 09:18

## 2017-06-11 RX ADMIN — LEVALBUTEROL HYDROCHLORIDE 1.25 MG: 1.25 SOLUTION, CONCENTRATE RESPIRATORY (INHALATION) at 08:31

## 2017-06-11 RX ADMIN — INSULIN LISPRO 4 UNITS: 100 INJECTION, SOLUTION INTRAVENOUS; SUBCUTANEOUS at 11:56

## 2017-06-11 RX ADMIN — PANTOPRAZOLE SODIUM 40 MG: 40 TABLET, DELAYED RELEASE ORAL at 05:47

## 2017-06-11 RX ADMIN — PANCRELIPASE 6000 UNITS: 30000; 6000; 19000 CAPSULE, DELAYED RELEASE PELLETS ORAL at 11:55

## 2017-06-11 RX ADMIN — PREDNISONE 40 MG: 20 TABLET ORAL at 09:18

## 2017-06-11 RX ADMIN — INSULIN LISPRO 10 UNITS: 100 INJECTION, SOLUTION INTRAVENOUS; SUBCUTANEOUS at 11:58

## 2017-06-11 RX ADMIN — ASPIRIN 81 MG 81 MG: 81 TABLET ORAL at 09:19

## 2017-06-11 RX ADMIN — FLUOXETINE 40 MG: 20 CAPSULE ORAL at 09:18

## 2017-06-11 RX ADMIN — LISINOPRIL 5 MG: 5 TABLET ORAL at 09:18

## 2017-06-11 RX ADMIN — BENZONATATE 100 MG: 100 CAPSULE ORAL at 09:19

## 2017-06-11 RX ADMIN — HEPARIN 300 UNITS: 100 SYRINGE at 13:36

## 2017-06-11 RX ADMIN — GUAIFENESIN 1200 MG: 600 TABLET, EXTENDED RELEASE ORAL at 09:19

## 2017-06-11 RX ADMIN — IPRATROPIUM BROMIDE 0.5 MG: 0.5 SOLUTION RESPIRATORY (INHALATION) at 08:31

## 2017-06-11 RX ADMIN — HYDROCODONE POLISTIREX AND CHLORPHENIRAMINE POLISTIREX 5 ML: 10; 8 SUSPENSION, EXTENDED RELEASE ORAL at 09:30

## 2017-06-12 ENCOUNTER — GENERIC CONVERSION - ENCOUNTER (OUTPATIENT)
Dept: OTHER | Facility: OTHER | Age: 62
End: 2017-06-12

## 2017-06-19 ENCOUNTER — ALLSCRIPTS OFFICE VISIT (OUTPATIENT)
Dept: OTHER | Facility: OTHER | Age: 62
End: 2017-06-19

## 2017-06-30 ENCOUNTER — ALLSCRIPTS OFFICE VISIT (OUTPATIENT)
Dept: OTHER | Facility: OTHER | Age: 62
End: 2017-06-30

## 2017-07-03 ENCOUNTER — HOSPITAL ENCOUNTER (EMERGENCY)
Facility: HOSPITAL | Age: 62
Discharge: HOME | End: 2017-07-03
Attending: EMERGENCY MEDICINE | Admitting: EMERGENCY MEDICINE
Payer: COMMERCIAL

## 2017-07-03 ENCOUNTER — APPOINTMENT (EMERGENCY)
Dept: CT IMAGING | Facility: HOSPITAL | Age: 62
End: 2017-07-03
Payer: COMMERCIAL

## 2017-07-03 VITALS
WEIGHT: 132 LBS | HEART RATE: 66 BPM | BODY MASS INDEX: 23.38 KG/M2 | RESPIRATION RATE: 18 BRPM | OXYGEN SATURATION: 97 % | TEMPERATURE: 98.1 F | SYSTOLIC BLOOD PRESSURE: 165 MMHG | DIASTOLIC BLOOD PRESSURE: 76 MMHG

## 2017-07-03 DIAGNOSIS — E87.0 HYPERNATREMIA: ICD-10-CM

## 2017-07-03 DIAGNOSIS — R10.9 ABDOMINAL PAIN: ICD-10-CM

## 2017-07-03 DIAGNOSIS — E87.5 HYPERKALEMIA: ICD-10-CM

## 2017-07-03 DIAGNOSIS — N39.0 URINARY TRACT INFECTION: Primary | ICD-10-CM

## 2017-07-03 LAB
ANION GAP SERPL CALCULATED.3IONS-SCNC: 7 MMOL/L (ref 4–13)
ANION GAP SERPL CALCULATED.3IONS-SCNC: 7 MMOL/L (ref 4–13)
BASOPHILS # BLD AUTO: 0.01 THOUSANDS/ΜL (ref 0–0.1)
BASOPHILS NFR BLD AUTO: 0 % (ref 0–1)
BUN SERPL-MCNC: 20 MG/DL (ref 5–25)
BUN SERPL-MCNC: 20 MG/DL (ref 5–25)
CALCIUM SERPL-MCNC: 8.1 MG/DL (ref 8.3–10.1)
CALCIUM SERPL-MCNC: 8.4 MG/DL (ref 8.3–10.1)
CHLORIDE SERPL-SCNC: 102 MMOL/L (ref 100–108)
CHLORIDE SERPL-SCNC: 102 MMOL/L (ref 100–108)
CLARITY, POC: ABNORMAL
CO2 SERPL-SCNC: 23 MMOL/L (ref 21–32)
CO2 SERPL-SCNC: 24 MMOL/L (ref 21–32)
COLOR, POC: YELLOW
CREAT SERPL-MCNC: 1.35 MG/DL (ref 0.6–1.3)
CREAT SERPL-MCNC: 1.37 MG/DL (ref 0.6–1.3)
EOSINOPHIL # BLD AUTO: 0.15 THOUSAND/ΜL (ref 0–0.61)
EOSINOPHIL NFR BLD AUTO: 4 % (ref 0–6)
ERYTHROCYTE [DISTWIDTH] IN BLOOD BY AUTOMATED COUNT: 15.6 % (ref 11.6–15.1)
EXT BILIRUBIN, UA: NEGATIVE
EXT BLOOD URINE: ABNORMAL
EXT GLUCOSE, UA: NEGATIVE
EXT KETONES: NEGATIVE
EXT NITRITE, UA: NEGATIVE
EXT PH, UA: 6
EXT PROTEIN, UA: ABNORMAL
EXT SPECIFIC GRAVITY, UA: 1.02
EXT UROBILINOGEN: 0.2
GFR SERPL CREATININE-BSD FRML MDRD: 38.9 ML/MIN/1.73SQ M
GFR SERPL CREATININE-BSD FRML MDRD: 39.6 ML/MIN/1.73SQ M
GLUCOSE SERPL-MCNC: 305 MG/DL (ref 65–140)
GLUCOSE SERPL-MCNC: 323 MG/DL (ref 65–140)
HCT VFR BLD AUTO: 29.6 % (ref 34.8–46.1)
HGB BLD-MCNC: 9.2 G/DL (ref 11.5–15.4)
LYMPHOCYTES # BLD AUTO: 1.12 THOUSANDS/ΜL (ref 0.6–4.47)
LYMPHOCYTES NFR BLD AUTO: 26 % (ref 14–44)
MCH RBC QN AUTO: 27.5 PG (ref 26.8–34.3)
MCHC RBC AUTO-ENTMCNC: 31.1 G/DL (ref 31.4–37.4)
MCV RBC AUTO: 88 FL (ref 82–98)
MONOCYTES # BLD AUTO: 0.36 THOUSAND/ΜL (ref 0.17–1.22)
MONOCYTES NFR BLD AUTO: 8 % (ref 4–12)
NEUTROPHILS # BLD AUTO: 2.69 THOUSANDS/ΜL (ref 1.85–7.62)
NEUTS SEG NFR BLD AUTO: 62 % (ref 43–75)
PLATELET # BLD AUTO: 127 THOUSANDS/UL (ref 149–390)
PMV BLD AUTO: 10.4 FL (ref 8.9–12.7)
POTASSIUM SERPL-SCNC: 5.9 MMOL/L (ref 3.5–5.3)
POTASSIUM SERPL-SCNC: 6.1 MMOL/L (ref 3.5–5.3)
RBC # BLD AUTO: 3.35 MILLION/UL (ref 3.81–5.12)
SODIUM SERPL-SCNC: 132 MMOL/L (ref 136–145)
SODIUM SERPL-SCNC: 133 MMOL/L (ref 136–145)
WBC # BLD AUTO: 4.33 THOUSAND/UL (ref 4.31–10.16)
WBC # BLD EST: ABNORMAL 10*3/UL

## 2017-07-03 PROCEDURE — 36415 COLL VENOUS BLD VENIPUNCTURE: CPT | Performed by: EMERGENCY MEDICINE

## 2017-07-03 PROCEDURE — 81002 URINALYSIS NONAUTO W/O SCOPE: CPT | Performed by: EMERGENCY MEDICINE

## 2017-07-03 PROCEDURE — 85025 COMPLETE CBC W/AUTO DIFF WBC: CPT | Performed by: EMERGENCY MEDICINE

## 2017-07-03 PROCEDURE — 87086 URINE CULTURE/COLONY COUNT: CPT | Performed by: EMERGENCY MEDICINE

## 2017-07-03 PROCEDURE — 80048 BASIC METABOLIC PNL TOTAL CA: CPT | Performed by: EMERGENCY MEDICINE

## 2017-07-03 PROCEDURE — 99284 EMERGENCY DEPT VISIT MOD MDM: CPT

## 2017-07-03 PROCEDURE — 74176 CT ABD & PELVIS W/O CONTRAST: CPT

## 2017-07-03 PROCEDURE — 93005 ELECTROCARDIOGRAM TRACING: CPT

## 2017-07-03 PROCEDURE — 96360 HYDRATION IV INFUSION INIT: CPT

## 2017-07-03 RX ORDER — ACETAMINOPHEN 325 MG/1
650 TABLET ORAL ONCE
Status: DISCONTINUED | OUTPATIENT
Start: 2017-07-03 | End: 2017-07-04 | Stop reason: HOSPADM

## 2017-07-03 RX ORDER — MORPHINE SULFATE 30 MG/1
30 TABLET ORAL ONCE
Qty: 20 TABLET | Refills: 0 | Status: SHIPPED | OUTPATIENT
Start: 2017-07-03 | End: 2017-07-03

## 2017-07-03 RX ORDER — CEPHALEXIN 500 MG/1
500 CAPSULE ORAL
Qty: 21 CAPSULE | Refills: 0 | Status: SHIPPED | OUTPATIENT
Start: 2017-07-03 | End: 2017-07-10

## 2017-07-03 RX ORDER — MORPHINE SULFATE 15 MG/1
30 TABLET ORAL ONCE
Status: COMPLETED | OUTPATIENT
Start: 2017-07-03 | End: 2017-07-03

## 2017-07-03 RX ORDER — CEPHALEXIN 250 MG/1
500 CAPSULE ORAL ONCE
Status: COMPLETED | OUTPATIENT
Start: 2017-07-03 | End: 2017-07-03

## 2017-07-03 RX ADMIN — MORPHINE SULFATE 30 MG: 15 TABLET ORAL at 22:06

## 2017-07-03 RX ADMIN — SODIUM CHLORIDE 1000 ML: 0.9 INJECTION, SOLUTION INTRAVENOUS at 21:12

## 2017-07-03 RX ADMIN — CEPHALEXIN 500 MG: 250 CAPSULE ORAL at 22:06

## 2017-07-04 LAB
ATRIAL RATE: 68 BPM
P AXIS: 51 DEGREES
PR INTERVAL: 172 MS
QRS AXIS: 60 DEGREES
QRSD INTERVAL: 86 MS
QT INTERVAL: 386 MS
QTC INTERVAL: 410 MS
T WAVE AXIS: 49 DEGREES
VENTRICULAR RATE: 68 BPM

## 2017-07-05 LAB — BACTERIA UR CULT: NORMAL

## 2017-07-20 ENCOUNTER — ALLSCRIPTS OFFICE VISIT (OUTPATIENT)
Dept: OTHER | Facility: OTHER | Age: 62
End: 2017-07-20

## 2017-07-20 ENCOUNTER — LAB REQUISITION (OUTPATIENT)
Dept: LAB | Facility: HOSPITAL | Age: 62
End: 2017-07-20
Payer: COMMERCIAL

## 2017-07-20 DIAGNOSIS — N32.9 BLADDER DISORDER: ICD-10-CM

## 2017-07-20 DIAGNOSIS — R35.0 FREQUENCY OF MICTURITION: ICD-10-CM

## 2017-07-20 DIAGNOSIS — R31.9 HEMATURIA: ICD-10-CM

## 2017-07-20 DIAGNOSIS — R30.0 DYSURIA: ICD-10-CM

## 2017-07-20 LAB
CLARITY UR: NORMAL
COLOR UR: YELLOW
GLUCOSE (HISTORICAL): NORMAL
HGB UR QL STRIP.AUTO: NORMAL
KETONES UR STRIP-MCNC: NORMAL MG/DL
LEUKOCYTE ESTERASE UR QL STRIP: NORMAL
NITRITE UR QL STRIP: NORMAL
PH UR STRIP.AUTO: 5 [PH]
PROT UR STRIP-MCNC: 1000 MG/DL
SP GR UR STRIP.AUTO: 1.02

## 2017-07-20 PROCEDURE — 88305 TISSUE EXAM BY PATHOLOGIST: CPT | Performed by: UROLOGY

## 2017-07-20 PROCEDURE — 87086 URINE CULTURE/COLONY COUNT: CPT | Performed by: UROLOGY

## 2017-07-21 ENCOUNTER — LAB REQUISITION (OUTPATIENT)
Dept: LAB | Facility: HOSPITAL | Age: 62
End: 2017-07-21
Payer: COMMERCIAL

## 2017-07-21 DIAGNOSIS — N32.9 BLADDER DISORDER: ICD-10-CM

## 2017-07-21 LAB — BACTERIA UR CULT: NORMAL

## 2017-10-01 DIAGNOSIS — N20.0 CALCULUS OF KIDNEY: ICD-10-CM

## 2018-01-12 VITALS
DIASTOLIC BLOOD PRESSURE: 78 MMHG | WEIGHT: 135.13 LBS | HEIGHT: 62 IN | RESPIRATION RATE: 14 BRPM | TEMPERATURE: 96.8 F | HEART RATE: 84 BPM | BODY MASS INDEX: 24.87 KG/M2 | SYSTOLIC BLOOD PRESSURE: 140 MMHG

## 2018-01-12 VITALS
HEIGHT: 62 IN | DIASTOLIC BLOOD PRESSURE: 90 MMHG | HEART RATE: 88 BPM | SYSTOLIC BLOOD PRESSURE: 138 MMHG | BODY MASS INDEX: 25.05 KG/M2 | WEIGHT: 136.13 LBS

## 2018-01-12 VITALS
WEIGHT: 132 LBS | TEMPERATURE: 97.5 F | HEART RATE: 78 BPM | HEIGHT: 62 IN | SYSTOLIC BLOOD PRESSURE: 116 MMHG | DIASTOLIC BLOOD PRESSURE: 70 MMHG | RESPIRATION RATE: 16 BRPM | BODY MASS INDEX: 24.29 KG/M2

## 2018-01-12 VITALS
BODY MASS INDEX: 25.11 KG/M2 | WEIGHT: 133 LBS | HEIGHT: 61 IN | HEART RATE: 67 BPM | DIASTOLIC BLOOD PRESSURE: 58 MMHG | SYSTOLIC BLOOD PRESSURE: 124 MMHG

## 2018-01-12 VITALS
HEART RATE: 72 BPM | WEIGHT: 127.5 LBS | SYSTOLIC BLOOD PRESSURE: 132 MMHG | BODY MASS INDEX: 23.46 KG/M2 | DIASTOLIC BLOOD PRESSURE: 84 MMHG | HEIGHT: 62 IN

## 2018-01-12 NOTE — RESULT NOTES
Verified Results  [place] MYOCARDIAL PERFUSION SPECT (RX STRESS AND/OR REST) 11JKA3890 12:49PM Vicente Holloway     Test Name Result Flag Reference   [place] MYOCARDIAL PERFUSION SPECT (RX STRESS AND/OR REST) (Report)     [address], [address] [place] [address]   [phone]     Rest/Stress Gated SPECT Myocardial Perfusion Imaging After Regadenoson     Patient: Beverly Reyes   MR number: [id number]   Account number: [de-identified]   : 1955   Age: 59 years   Gender: Female   Status: Outpatient   Location: [address] [place] [address]   Height: 62 in   Weight: 124 lb   BP: 110/ 72 mmHg     Allergies: CIPROFLOXACIN     Diagnosis: I25 10 - Atherosclerotic heart disease of native coronary artery without angina pectoris, R94 31 - Abnormal electrocardiogram [ECG] [EKG]     Primary Physician: Alanna Hoskins MD   RN: lCark Forbes RN BSN   Referring Physician: Sherral Closs, MD   Technician: Charly Rosas   Group: [facility]   Report Prepared By[de-identified] Clark Forbes RN BSN   Interpreting Physician: Nickie Kong MD     INDICATIONS: Evaluation of known coronary artery disease  HISTORY: pancreatic cancer-in remission   former IV drug abuser   current smoker The patient is a 59year old  female  Chest pain status: no chest pain  Other symptoms: decreased effort tolerance  Coronary artery disease risk factors: dyslipidemia, hypertension, smoking, family history of   premature coronary artery disease, and post-menopausal state  Cardiovascular history: coronary artery disease, prior myocardial infarction, and stroke  Co-morbidity: history of lung disease  Medications: a beta blocker  PHYSICAL EXAM: Baseline physical exam screening: no wheezes audible  REST ECG: Normal sinus rhythm  The ECG showed rare premature ventricular contractions  PROCEDURE: The study was performed at the [address] [place] [address]   A regadenoson infusion pharmacologic stress test was performed  Gated SPECT myocardial perfusion imaging was performed after stress  Systolic blood pressure was   110 mmHg, at the start of the study  Diastolic blood pressure was 72 mmHg, at the start of the study  The heart rate was 76 bpm, at the start of the study  IV double checked  Regadenoson protocol:   HR bpm SBP mmHg DBP mmHg Symptoms Rhythm/conduct   Baseline 76 110 72 none occasional PAC's, occasional PVC's   2 min 101 130 72 mild dyspnea occasional PAC's, occasional PVC's   5 min 86 134 78 none occasional PAC's, occasional PVC's   attempted a walking regadenoson unable to walk on the treadmil walked in place for 3 minutes  No medications or fluids given  The patient also performed low level exercise  STRESS SUMMARY: Duration of pharmacologic stress was Functional capacity could not be adequately assessed  Maximal heart rate during stress was 101 bpm  There was normal resting blood pressure with an appropriate response to stress  The   rate-pressure product for the [place] heart rate and blood pressure was 99319  There was no chest pain during stress  The stress test was terminated due to protocol completion  Pre oxygen saturation: 100 %  [place] oxygen saturation: 98 %  The   stress ECG was negative for ischemia  Arrhythmia during stress: isolated atrial premature beats and isolated premature ventricular beats  ISOTOPE ADMINISTRATION:   Resting isotope administration Stress isotope administration   Agent Tetrofosmin Tetrofosmin   Dose 10 93 mCi 32 7 mCi   Date 01/11/2017 01/11/2017   Injection time 13:30 14:30   Imaging time 14:05 15:10   Injection-image interval 35 min 40 min     The radiopharmaceutical was injected at the peak effect of pharmacologic stress  MYOCARDIAL PERFUSION IMAGING:   The image quality was excellent  Left ventricular size was normal  The TID ratio was 1 15  PERFUSION DEFECTS:   - There were no perfusion defects       GATED SPECT:   The calculated left ventricular ejection fraction was 62 %  Left ventricular ejection fraction was within normal limits by visual estimate  Ejection fraction was overestimated due to small heart size  There was no left ventricular regional   abnormality  SUMMARY:   - Stress results: There was no chest pain during stress  - ECG conclusions: The stress ECG was negative for ischemia  - Perfusion imaging: There were no perfusion defects    - Gated SPECT: The calculated left ventricular ejection fraction was 62 %  Left ventricular ejection fraction was within normal limits by visual estimate  There was no left ventricular regional abnormality  IMPRESSIONS: Normal study after pharmacologic vasodilation  Myocardial perfusion imaging was normal at rest and with stress   Left ventricular systolic function was normal      Prepared and signed by     Giovana Luis MD   Signed 01/11/2017 16:42:26

## 2018-01-12 NOTE — PROCEDURES
Procedures by Rae Thompson DO at 11/13/2016  3:53 PM      Author:  Rae Thompson DO Service:  Critical Care/ICU Author Type:  Physician     Filed:  11/13/2016  4:02 PM Date of Service:  11/13/2016  3:53 PM Status:  Signed     :  Rae Thompson DO (Physician)         Pre-procedure Diagnoses:       1  Acute respiratory failure with hypoxia [J96 01]       2  Infiltrate of lung present on imaging of chest [R91 8]                Post-procedure Diagnoses:       1  Acute respiratory failure with hypoxia [J96 01]       2  Infiltrate of lung present on imaging of chest [R91 8]                Procedures:       1  BRONCHOSCOPY [id number] (Custom)]                   BRONCHOSCOPY NOTE   Parvez Brood 59 y o  female MRN: [id number]  Unit/Bed#:  Encounter: [id number]    LOCATION: [place] [address]    Consent was obtained  Images reviewed prior to the procedure  Final Time Out was performed  ASA: 4    MALLAMPATI SCORE: 2    MONITORING:  EKG, pulse, pulse oximetry were monitored continuously during the procedure  Blood pressure was monitored every 3 minutes during the procedure  SEDATION: Propofol infusion @ 50 mcg/kg/min, versed 7 mg IV, fentanyl 150 mcg IV    PROCEDURE:  Standard airway preparation completed per respiratory therapy protocol  After appropriate level of conscious sedation was achieved, a standard bronchoscope was inserted thru ETT and advanced to the distal trachea  The bronchoscope  was then advanced to the main eber  The trachea was normal in appearance  The main eber was normal in appearance  Airway survey was completed bilaterally to at least the second segmental level  The anatomy was normal   The mucosa was normal in  appearance  There were no significant secretions  There were no endobronchial masses, lesions, or obstructions noted        Bronchoalveolar Lavage performed: BAL x 3 (60 ml NSS) in lingula with blood-tinged return, BAL x 3 (60 ml)     FINDINGS: Normal airway exam, BAL x 2 lobes performed, bloody return but ot consistent with alveolar hemorrhage    COMPLICATIONS:  None  There were no unplanned events  ESTIMATED BLOOD LOSS: Minimal    RECOMMENDATIONS:  Standard post bronchoscopy recovery instructions  Return to prior diet once topical analgesia has resolved          Harvey Paz DO             Received for:Anayeli Wells DO  Nov 13 2016  4:02PM [facility] Standard Time

## 2018-01-13 VITALS
SYSTOLIC BLOOD PRESSURE: 132 MMHG | RESPIRATION RATE: 14 BRPM | DIASTOLIC BLOOD PRESSURE: 92 MMHG | OXYGEN SATURATION: 98 % | BODY MASS INDEX: 25.03 KG/M2 | WEIGHT: 136 LBS | HEIGHT: 62 IN | HEART RATE: 76 BPM

## 2018-01-13 VITALS
DIASTOLIC BLOOD PRESSURE: 78 MMHG | SYSTOLIC BLOOD PRESSURE: 118 MMHG | HEART RATE: 74 BPM | WEIGHT: 132 LBS | BODY MASS INDEX: 24.29 KG/M2 | HEIGHT: 62 IN

## 2018-01-13 VITALS
HEIGHT: 62 IN | BODY MASS INDEX: 24.91 KG/M2 | HEART RATE: 78 BPM | RESPIRATION RATE: 16 BRPM | DIASTOLIC BLOOD PRESSURE: 72 MMHG | TEMPERATURE: 97.2 F | WEIGHT: 135.38 LBS | SYSTOLIC BLOOD PRESSURE: 130 MMHG

## 2018-01-13 VITALS
BODY MASS INDEX: 23.74 KG/M2 | DIASTOLIC BLOOD PRESSURE: 80 MMHG | TEMPERATURE: 97.1 F | SYSTOLIC BLOOD PRESSURE: 140 MMHG | RESPIRATION RATE: 18 BRPM | HEIGHT: 62 IN | HEART RATE: 82 BPM | WEIGHT: 129 LBS

## 2018-01-13 VITALS
HEART RATE: 82 BPM | HEIGHT: 61 IN | WEIGHT: 133 LBS | TEMPERATURE: 99.7 F | BODY MASS INDEX: 25.11 KG/M2 | RESPIRATION RATE: 18 BRPM | SYSTOLIC BLOOD PRESSURE: 140 MMHG | DIASTOLIC BLOOD PRESSURE: 70 MMHG

## 2018-01-13 NOTE — MISCELLANEOUS
Reason For Visit  Reason For Visit Free Text Note Form: Received request to contact pt  regarding obtaining appointment with Oncology  Call to pt  who reports she has received a call from [address] who has requested that pt  obtain medical information from [facility] and then call them back  Pt  reports she has information from [address]  and has the number for the [facility]  She will contact them to schedule an appointment and advise of any difficulties in doing so  Pt  denies further needs at this time  Will continue to be available to provide support  Active Problems    1  Chronic GERD (530 81) (K21 9)   2  Diabetes mellitus (250 00) (E11 9)   3  Dysuria (788 1) (R30 0)   4  Encounter for screening mammogram for breast cancer (V76 12) (Z12 31)   5  Need for influenza vaccination (V04 81) (Z23)   6  Obesity (278 00) (E66 9)   7  Pulmonary nodule (793 11) (R91 1)   8  Right kidney stone (592 0) (N20 0)   9  Screening for lipid disorders (V77 91) (Z13 220)   10  Tobacco abuse (305 1) (Z72 0)   11  Weight loss (783 21) (R63 4)    Current Meds   1  AmLODIPine Besylate 5 MG Oral Tablet; Take two tablets daily  Requested for:   94TGN5221; Last Rx:36Flf7790 Ordered   2  Atorvastatin Calcium 20 MG Oral Tablet; take one tablet daily  Requested for: 13ZWY4505;   Last Rx:18Hsy1340 Ordered   3  Creon 6000 UNIT Oral Capsule Delayed Release Particles; Take one capsule daily    Requested for: 31WVN0631; Last Rx:80Jjs6223 Ordered   4  Cyclobenzaprine HCl - 10 MG Oral Tablet; TAKE ONE TABLET THREE TIMES A DAY AS   NEEDED  Requested for: 10RZG1011; Last Rx:46Wfs9119 Ordered   5  Dicyclomine HCl - 10 MG Oral Capsule; take one tablet twice daily  Requested for:   82MVE5526; Last Rx:17Yed2879 Ordered   6  FLUoxetine HCl - 10 MG Oral Capsule; Therapy: 58OUR6718 to Recorded   7   FLUoxetine HCl - 10 MG Oral Tablet; TAKE ONE TABLET BY MOUTH EVERY DAY AS   DIRECTED  Requested for: 53MEL7351; Last Rx:66Ccj7357 Ordered   8  FLUoxetine HCl - 20 MG Oral Capsule (PROzac); TAKE ONE TABLET DAILY WITH THE   10MG TO EQUAL 30 MG  Requested for: 30UNS9373; Last Rx:64Gxz6377 Ordered   9  Gabapentin 800 MG Oral Tablet; TAKE 1 TABLET 3 TIMES DAILY  Requested for:   08JTD4536; Last Rx:06Mpr7989 Ordered   10  Hydrocodone-Acetaminophen 5-325 MG Oral Tablet; TAKE ONE TO TWO TABLETS    EVERY 4 HOURS AS NEEDED FOR PAIN;    Therapy: (Recorded:68Yse1146) to Recorded   11  HydrOXYzine HCl - 50 MG Oral Tablet; Take one tablet daily at bedtime  Requested for:    89HKO7023; Last Rx:50Ppo8819 Ordered   12  Lantus 100 UNIT/ML Subcutaneous Solution; Therapy: 07IRN9297 to Recorded   13  Lantus SoloStar 100 UNIT/ML Subcutaneous Solution Pen-injector; INJECT 20 UNIT    Twice daily  Requested for: 48GXX0011; Last Rx:11Nov2016 Ordered   14  MetFORMIN HCl - 1000 MG Oral Tablet; TAKE 1 TABLET TWICE DAILY  Requested for:    66VDW7368; Last Rx:35Pvc0069 Ordered   15  Mirtazapine 7 5 MG Oral Tablet; TAKE 1 TABLET AT BEDTIME; Therapy: 98DOD8660 to (Evaluate:13Nov2016)  Requested for: 35BLG9176; Last    Rx:60Usc7559 Ordered   16  Nabumetone 750 MG Oral Tablet; Therapy: 87NFM4135 to Recorded   17  Nicotine 14 MG/24HR Transdermal Patch 24 Hour; Use one patch per day for 6 weeks  Then use 7mg patch for 2 weeks; Therapy: 36VXX0331 to (Last Rx:14Oct2016)  Requested for: 14Oct2016 Ordered   18  Omeprazole 20 MG Oral Tablet Delayed Release; Take 1 tablet daily; Therapy: 72MND8172 to (Evaluate:52Hoo9135)  Requested for: 43ITZ5736; Last    Rx:14Oct2016 Ordered   19  Oxycodone-Acetaminophen 5-325 MG Oral Tablet; TAKE 1 TABLET EVERY 12 HOURS AS    NEEDED FOR PAIN;    Therapy: 82PRH5047 to (Evaluate:26Nov2016); Last Rx:11Nov2016 Ordered   20  Oxycodone-Acetaminophen 5-325 MG Oral Tablet; Therapy: 25TGQ4363 to Recorded   21  TraMADol HCl - 50 MG Oral Tablet; TAKE 1 TABLET EVERY 12 HOURS AS NEEDED; Therapy: 99LFB7499 to (Evaluate:20Rmw8593);  Last Rx:11Nov2016 Ordered   22  TraMADol HCl - 50 MG Oral Tablet; TAKE 1 TABLET EVERY 12 HOURS AS NEEDED; Therapy: 64QJR4239 to (Evaluate:28Oct2016); Last Rx:14Oct2016 Ordered    Allergies    1   Cipro    Signatures   Electronically signed by : KWAME Hodge; Nov 11 2016  2:40PM EST                       (Author)

## 2018-01-14 VITALS
BODY MASS INDEX: 25.94 KG/M2 | TEMPERATURE: 96.9 F | WEIGHT: 137.38 LBS | DIASTOLIC BLOOD PRESSURE: 70 MMHG | HEIGHT: 61 IN | RESPIRATION RATE: 16 BRPM | SYSTOLIC BLOOD PRESSURE: 114 MMHG | HEART RATE: 74 BPM

## 2018-01-14 VITALS
TEMPERATURE: 95.9 F | WEIGHT: 127 LBS | DIASTOLIC BLOOD PRESSURE: 70 MMHG | RESPIRATION RATE: 18 BRPM | HEART RATE: 76 BPM | SYSTOLIC BLOOD PRESSURE: 112 MMHG | BODY MASS INDEX: 23.98 KG/M2 | HEIGHT: 61 IN

## 2018-01-14 VITALS
HEIGHT: 62 IN | OXYGEN SATURATION: 90 % | SYSTOLIC BLOOD PRESSURE: 142 MMHG | HEART RATE: 99 BPM | TEMPERATURE: 97.8 F | BODY MASS INDEX: 23.92 KG/M2 | DIASTOLIC BLOOD PRESSURE: 96 MMHG | WEIGHT: 130 LBS | RESPIRATION RATE: 16 BRPM

## 2018-01-14 VITALS
TEMPERATURE: 97 F | RESPIRATION RATE: 16 BRPM | HEIGHT: 61 IN | DIASTOLIC BLOOD PRESSURE: 80 MMHG | SYSTOLIC BLOOD PRESSURE: 154 MMHG | BODY MASS INDEX: 24.4 KG/M2 | WEIGHT: 129.25 LBS | HEART RATE: 78 BPM

## 2018-01-14 VITALS
TEMPERATURE: 97.8 F | SYSTOLIC BLOOD PRESSURE: 112 MMHG | HEART RATE: 70 BPM | DIASTOLIC BLOOD PRESSURE: 68 MMHG | BODY MASS INDEX: 23.31 KG/M2 | HEIGHT: 61 IN | WEIGHT: 123.46 LBS

## 2018-01-14 VITALS
WEIGHT: 133.13 LBS | HEIGHT: 62 IN | HEART RATE: 76 BPM | DIASTOLIC BLOOD PRESSURE: 72 MMHG | SYSTOLIC BLOOD PRESSURE: 134 MMHG | BODY MASS INDEX: 24.5 KG/M2

## 2018-01-15 NOTE — RESULT NOTES
Verified Results  * CT CHEST WO CONTRAST [id number] 07:13AM Chantal Braswell     Test Name Result Flag Reference   CT CHEST WO CONTRAST (Report)     CT CHEST WITHOUT IV CONTRAST     INDICATION: Follow-up right lung consolidation, cough     COMPARISON: 4/23/2017     TECHNIQUE: CT examination of the chest was performed without intravenous contrast  Reformatted images were created in axial, sagittal, and coronal planes  Radiation dose length product (DLP) for this visit: 183 18 mGy-cm   This examination, like all CT scans performed in the [facility], was performed utilizing techniques to minimize radiation dose exposure, including the use of iterative   reconstruction and automated exposure control  FINDINGS:     LUNGS: Right lower lobe consolidation and bronchiectasis continues to mildly improved, most likely representing a resolving infectious or inflammatory process  Tiny right upper lobe lung nodule on series 601, image 97 is stable  There is mild    centrilobular emphysema  PLEURA: Unremarkable  HEART/GREAT VESSELS: Unremarkable for patient's age  MEDIASTINUM AND KG: Unremarkable  CHEST WALL AND LOWER NECK: Unremarkable  VISUALIZED STRUCTURES IN THE UPPER ABDOMEN: There is hepatomegaly with diffuse hepatic steatosis  OSSEOUS STRUCTURES: No acute fracture  No destructive osseous lesion  IMPRESSION:     1  Continued improvement of right lower lobe consolidation likely representing inflammatory or infectious process  Additional follow-up recommended to ensure complete resolution  2  Stable 2 mm right upper lobe lung nodule  This can also be reassessed on follow-up  3  Hepatomegaly with diffuse hepatic steatosis         Workstation performed: [id number]     Signed by:   Noralee Hodgkin, MD   5/31/17

## 2018-01-15 NOTE — PROGRESS NOTES
Assessment    1  Encounter for preventive health examination (V70 0) (Z00 00)   2  History of Type 2 diabetes mellitus with complication, with long-term current use of insulin   (250 90,V58 67) (E11 8,Z79 4)   3  History of malignant neoplasm of pancreas (V10 09) (Z85 07)   4  History of Proximal Subtotal Pancreatectomy (Whipple Procedure)   5  History of Herniated disc (722 2)   6  History of chronic obstructive lung disease (V12 69) (Z87 09)   7  History of Benign essential hypertension (401 1) (I10)   8  History of depression (V11 8) (Z86 59)   9  Right kidney stone (592 0) (N20 0)   10  History of Total Abdominal Hysterectomy   11  History of Appendectomy   12  History of Oophorectomy - Bilat (Removal Of Both Ovaries) Laparoscopic   13  Dysuria (788 1) (R30 0)   14  Screening for lipid disorders (V77 91) (Z13 220)   15  Obesity (278 00) (E66 9)    Plan  Dysuria    · (1) CBC/PLT/DIFF; Status:Active; Requested for:54Gyp1867;    · (1) COMPREHENSIVE METABOLIC PANEL; Status:Active; Requested for:84Mnu2989;    · (1) URINALYSIS w URINE C/S REFLEX (will reflex a microscopy if leukocytes, occult  blood, or nitrites are not within normal limits); Status:Active; Requested for:35Exc5911;    · Urine Dip Non-Automated- POC; Status:Complete;   Done: 88SZT5694 02:05PM  Health Maintenance    · AmLODIPine Besylate 5 MG Oral Tablet; Take two tablets daily   · Atorvastatin Calcium 20 MG Oral Tablet; take one tablet daily   · Creon 6000 UNIT Oral Capsule Delayed Release Particles;  Take one capsule  daily   · Cyclobenzaprine HCl - 10 MG Oral Tablet; TAKE ONE TABLET THREE TIMES A  DAY AS NEEDED   · Dicyclomine HCl - 10 MG Oral Capsule; take one tablet twice daily   · FLUoxetine HCl - 10 MG Oral Tablet; TAKE ONE TABLET BY MOUTH EVERY  DAY AS DIRECTED   · FLUoxetine HCl - 20 MG Oral Capsule (PROzac); TAKE ONE TABLET DAILY  WITH THE 10MG TO EQUAL 30 MG   · Gabapentin 800 MG Oral Tablet; TAKE 1 TABLET 3 TIMES DAILY   · HydrOXYzine HCl - 50 MG Oral Tablet; Take one tablet daily at bedtime   · Lantus SoloStar 100 UNIT/ML Subcutaneous Solution Pen-injector; Inject 20  units at bedtime   · MetFORMIN HCl - 1000 MG Oral Tablet; TAKE 1 TABLET TWICE DAILY  Need for influenza vaccination    · Fluarix Quadrivalent 0 5 ML Intramuscular Suspension Prefilled Syringe  Obesity    · (1) LIPID PANEL, FASTING; Status:Active; Requested for:02Mgj5787;   Right kidney stone    · TraMADol HCl - 50 MG Oral Tablet; TAKE 1 TABLET EVERY 12 HOURS AS  NEEDED   · *1 - Stephen 38 Physician Referral  Consult  Status: Hold For - Scheduling   Requested for: 58NRJ5829  Care Summary provided  : Yes    Discussion/Summary    58 yo female with:  1) Hx right renal calculus: now with reports of dysuria and flank pain  Patient is hemodynamically stable and non toxic appearing  Will check CBC and CMP to evaluate  UA in office not suspicious for UTI, will send for official UA and culture  Will refer to urology clinic for further evaluation as pt reports she has a stone which requires lithotripsy, however I do not have records of this specifically  She was given strict return precautions and told to go to the ER should she develop new or worsening symptoms  We will contact the urology clinic in attempts to expedite her apt with their office  She was given a short supply of Tramadol for pain control in the meantime  I suspect she may have chronic pain concerns and may benefit from a pain Managment consult at her next visit also  2) Hx Pancreatic ca s/p whipple: patient reports she is in "remission" status post her whipple and various rounds of chemotherapy  Will work to obtain more records to confirm patients hem/onc status and refer her to a specialist following her move to [place] from [facility]  3) DM: Continue Metformin 1000 mg BID, Gabapentin 800 mg TID, Lantus 20 units at bedtime, refilled previously prescribed home medications  Check CMP   Patient will need DM follow up at additional visit, podiatry and opthal evaluations, HA1C, urine analysis for proteinuria  4) HTN: stable  Continue Amlodipine 10 mg daily, refilled  5) HLD: Continue atorvastatin 20 mg daily, refilled today  Check lipid panel  6) Depression/Anxiety: Continue fluoxetine 30 mg daily  Medication refilled today  7) Tobacco Use: will address smoking cessation with patient at next visit especially given hx of COPD and pancreatic cancer  8) HM: Will need separate follow up visit for well women exam  Patient is s/p complete hysterectomy and will not need pap, will need Mammo  Follow up colonoscopy status at next visit  Flu shot given today, tolerated well  Follow up on other immunizations at next visit  Patient has many chronic and complicated medical conditions  I will see her back in 2-3 weeks for follow up  Chief Complaint  new patient, establish care      History of Present Illness  , Adult Female: The patient is being seen for a health maintenance evaluation  Social History: The patient is a current cigarette smoker  She reports never drinking alcohol  She has previously used illicit drugs and 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  She does continue to use tobacco  She has many concerns today, and is very tearful during conversation  Her most important concern is pain from kidney stone  She states she was suppose to see a urologist in [place] to have "the stone blasted" but she was unable to follow up  She denies fever or chills  She is eating and drinking  Review of Systems    Constitutional: no fever and no chills  Cardiovascular: no chest pain and no palpitations  Respiratory: no shortness of breath and no cough  Gastrointestinal: abdominal pain, but no nausea, no vomiting, no diarrhea and no blood in stools  Genitourinary: dysuria  Musculoskeletal: no arthralgias and no myalgias  Integumentary: no rashes and no skin wound  Neurological: no headache and no dizziness  Psychiatric: anxiety and depression, but not suicidal      ROS reviewed  Active Problems    1  Dysuria (788 1) (R30 0)   2  Need for influenza vaccination (V04 81) (Z23)   3  Obesity (278 00) (E66 9)   4  Right kidney stone (592 0) (N20 0)   5  Screening for lipid disorders (V77 91) (Z13 220)    Past Medical History    · History of Benign essential hypertension (401 1) (I10)   · History of Herniated disc (722 2)   · History of chronic obstructive lung disease (V12 69) (Z87 09)   · History of depression (V11 8) (Z86 59)   · History of malignant neoplasm of pancreas (V10 09) (Z85 07)   · History of Type 2 diabetes mellitus with complication, with long-term current use of insulin  (250 90,V58 67) (E11 8,Z79  4)    Surgical History    · History of Appendectomy   · History of Oophorectomy - Bilat (Removal Of Both Ovaries) Laparoscopic   · History of Proximal Subtotal Pancreatectomy (Whipple Procedure)   · History of Total Abdominal Hysterectomy    Family History  Mother    · Family history of cardiac disorder (V17 49) (Z80 55)  Father    · Family history of diabetes mellitus (V18 0) (Z83 3)   · Family history of hypertension (V17 49) (Z82 49)    Social History    · Always uses seat belt   · Current every day smoker (305 1) (F17 200)   · No alcohol use   · No drug use    Current Meds   1  Hydrocodone-Acetaminophen 5-325 MG Oral Tablet; TAKE ONE TO TWO TABLETS   EVERY 4 HOURS AS NEEDED FOR PAIN;   Therapy: (Recorded:86Nly8950) to Recorded    Allergies    1  Cipro    Vitals   Recorded: 79XPY8851 75:90MO   Systolic 569   Diastolic 74   Heart Rate 78   Respiration 16   Temperature 98 2 F   Pain Scale 9   Height 5 ft 1 7 in   Weight 125 lb    BMI Calculated 23 09   BSA Calculated 1 56     Physical Exam    Constitutional   General appearance: No acute distress, well appearing and well nourished  Head and Face   Head and face: Normal     Palpation of the face and sinuses: No sinus tenderness  Eyes   Conjunctiva and lids: No swelling, erythema or discharge  Pupils and irises: Equal, round, reactive to light  Ophthalmoscopic examination: Normal fundi and optic discs  Ears, Nose, Mouth, and Throat   External inspection of ears and nose: Normal     Otoscopic examination: Tympanic membranes translucent with normal light reflex  Canals patent without erythema  Nasal mucosa, septum, and turbinates: Normal without edema or erythema  Lips, teeth, and gums: Normal, good dentition  Oropharynx: Normal with no erythema, edema, exudate or lesions  Pulmonary   Respiratory effort: No increased work of breathing or signs of respiratory distress  Auscultation of lungs: Clear to auscultation  Cardiovascular   Auscultation of heart: Abnormal   The heart rate was normal  The rhythm was regular  A grade 2 systolic murmur was heard at the RUSB  A grade 2 systolic murmur was heard at the LUSB     Peripheral vascular exam: Normal     Examination of extremities for edema and/or varicosities: Normal     Chest   Chest: Abnormal   Well healed [place] is present on the superior aspect of patient's chest  No signs of infection  Well healed surgical scar  Abdomen   Abdomen: Abnormal   The abdomen was flat  Bowel sounds were normal  Skin findings: a scar in the right upper quadrant and in the mid-line   There was mild tenderness that was diffuse  No rebound tenderness  No guarding  The abdomen did not have ascites  right CVA tenderness  Musculoskeletal   Gait and station: Normal     Digits and nails: Normal without clubbing or cyanosis  Joints, bones, and muscles: Normal     Range of motion: Normal     Stability: Normal     Muscle strength/tone: Normal     Neurologic   Cranial nerves: Cranial nerves II-XII intact  Reflexes: 2+ and symmetric  Sensation: No sensory loss  Psychiatric   Orientation to person, place, and time: Normal     Mood and affect: Abnormal   Mood and Affect: anxious and tearful  Results/Data  Urine Dip Non-Automated- POC [id number] 02:05PM Rachel Sommer     Test Name Result Flag Reference   Color Yellow     Clarity Hazy     Leukocytes moderate     Nitrite negative     Blood moderate     Bilirubin negative     Urobilinogen 0 2     Protein trace     Ph 6 0     Specific Gravity 1 010     Ketone negative     Glucose 2000         Attending Note  Attending Note: Level of Participation: I was present in clinic, but did not examine the patient  Diagnosis and Plan: Nephrolithiasis mgmt as above  I agree with the Resident's note  Future Appointments    Date/Time Provider Specialty Site   10/14/2016 08:30 AM Angelica Blandon DO Family Medicine [address]   01/03/2017 07:25 AM Specialty Clinic, Urology  [address]     Signatures   Electronically signed by : Lance Sharma DO; Oct  1 2016 11:45AM EST                       (Author)    Electronically signed by :  Barry Raymond MD; Oct  3 2016  5:36PM EST                       (Author)

## 2018-01-16 NOTE — MISCELLANEOUS
Plan  Right kidney stone    · Oxycodone-Acetaminophen 5-325 MG Oral Tablet   Rx By: Giselle Gonsales; Dispense: 15 Days ; #:30 Tablet; Refill: 0; For: Right kidney stone; KELLY = N; Print Rx; Last Updated By: Augustina Arboleda; 11/23/2016 6:00:13 PM   · TraMADol HCl - 50 MG Oral Tablet   Rx By: Soham Lord; Dispense: 14 Days ; #:28 Tablet; Refill: 0; For: Right kidney stone; KELLY = N; Print Rx; Last Updated By: Augustina Arboleda; 11/23/2016 6:00:12 PM   · TraMADol HCl - 50 MG Oral Tablet   Rx By: Giselle Gonsales; Dispense: 30 Days ; #:60 Tablet; Refill: 0; For: Right kidney stone; KELLY = N; Print Rx; Last Updated By: Augustina Arboleda; 11/23/2016 6:00:13 PM    Discussion/Summary  Discussion Summary:   Patient no showed appointment today  Will call patient and attempt to reschedule hospital follow up visit  History of Present Illness  TCM Communication [facility]: The patient is being contacted for follow-up after hospitalization  Hospital records were reviewed  She was hospitalized at [facility]  The date of admission: 11/12/2016, date of discharge: 11/23/2016  Diagnosis: Acute Respiratory Failure with Hypoxia, Diabetes Mellitus, Chronic Obstructive Pulmonary Disease, Community Acquired Pneumonia, Suspected Pulmonary Embolism, Severe Sepsis, Hyperglycemia, Lactic Acidosis, Volume Overload, Anemia, Depression, Thrombocytopenia, Adult Respiratory Distress Syndrome, Non-St Elevated Myocardial Infarction, Hepatitis C, Encephalopathy Acute, Hypernatremia  She was discharged to home  Medications reviewed and updated today  She scheduled a follow up appointment  Symptoms: fatigue, cough and shortness of breath  The patient is currently experiencing symptoms  Counseling was provided to the patient  Topics counseled included instructions for management, home health agency benefits and activities of daily living     Communication performed and completed by Shauna Gan LPN   HPI: Patient discharged today from hospital  She lives at home, has complete family support to help with all aspects of daily living  She has received all of her medication, does understand how to take it  Visiting nurse scheduled to come to her home, she is waiting for her home oxygen  Active Problems    1  Chronic GERD (530 81) (K21 9)   2  Diabetes mellitus (250 00) (E11 9)   3  Dysuria (788 1) (R30 0)   4  Encounter for screening mammogram for breast cancer (V76 12) (Z12 31)   5  Need for influenza vaccination (V04 81) (Z23)   6  Obesity (278 00) (E66 9)   7  Pulmonary nodule (793 11) (R91 1)   8  Right kidney stone (592 0) (N20 0)   9  Screening for lipid disorders (V77 91) (Z13 220)   10  Tobacco abuse (305 1) (Z72 0)   11  Weight loss (783 21) (R63 4)    Past Medical History    1  History of Benign essential hypertension (401 1) (I10)   2  History of Herniated disc (722 2)   3  History of chronic obstructive lung disease (V12 69) (Z87 09)   4  History of depression (V11 8) (Z86 59)   5  History of malignant neoplasm of pancreas (V10 09) (Z85 07)   6  History of Type 2 diabetes mellitus with complication, with long-term current use of   insulin (250 90,V58 67) (E11 8,Z79  4)    Surgical History    1  History of Appendectomy   2  History of Oophorectomy - Bilat (Removal Of Both Ovaries) Laparoscopic   3  History of Proximal Subtotal Pancreatectomy (Whipple Procedure)   4  History of Total Abdominal Hysterectomy    Family History  Mother    1  Family history of cardiac disorder (V17 49) (Z82 49)  Father    2  Family history of diabetes mellitus (V18 0) (Z83 3)   3  Family history of hypertension (V17 49) (Z82 49)    Social History    · Always uses seat belt   · Current every day smoker (305 1) (F17 200)   · No alcohol use   · No drug use    Current Meds   1  AmLODIPine Besylate 5 MG Oral Tablet; Take two tablets daily  Requested for:   12KEW4072; Last Rx:39Jua3814 Ordered   2   Atorvastatin Calcium 20 MG Oral Tablet; take one tablet daily  Requested for: 11IBO3063;   Last Rx:05Efq2630 Ordered   3  Creon 6000 UNIT Oral Capsule Delayed Release Particles; Take one capsule daily    Requested for: 25TSR8152; Last Rx:06Hji2517 Ordered   4  Cyclobenzaprine HCl - 10 MG Oral Tablet; TAKE ONE TABLET THREE TIMES A DAY AS   NEEDED  Requested for: 95TEF8709; Last Rx:16Lbs1199 Ordered   5  Dicyclomine HCl - 10 MG Oral Capsule; take one tablet twice daily  Requested for:   85XSC2191; Last Rx:98Wcj7140 Ordered   6  FLUoxetine HCl - 10 MG Oral Capsule; Therapy: 47QKC9935 to Recorded   7  FLUoxetine HCl - 10 MG Oral Tablet; TAKE ONE TABLET BY MOUTH EVERY DAY AS   DIRECTED  Requested for: 28VVP5824; Last Rx:65Lnr7081 Ordered   8  FLUoxetine HCl - 20 MG Oral Capsule; TAKE ONE TABLET DAILY WITH THE 10MG TO   EQUAL 30 MG  Requested for: 95WGJ2842; Last Rx:37Jlz3165 Ordered   9  Gabapentin 800 MG Oral Tablet; TAKE 1 TABLET 3 TIMES DAILY  Requested for:   01YFU9618; Last Rx:30Sep2016 Ordered   10  Hydrocodone-Acetaminophen 5-325 MG Oral Tablet; TAKE ONE TO TWO TABLETS    EVERY 4 HOURS AS NEEDED FOR PAIN;    Therapy: (Recorded:30Sep2016) to Recorded   11  HydrOXYzine HCl - 50 MG Oral Tablet; Take one tablet daily at bedtime  Requested for:    54JZY8932; Last Rx:30Sep2016 Ordered   12  Lantus 100 UNIT/ML Subcutaneous Solution; Therapy: 57TXY1665 to Recorded   13  Lantus SoloStar 100 UNIT/ML Subcutaneous Solution Pen-injector; INJECT 20 UNIT    Twice daily  Requested for: 95YEX2793; Last Rx:11Nov2016 Ordered   14  MetFORMIN HCl - 1000 MG Oral Tablet; TAKE 1 TABLET TWICE DAILY  Requested for:    74KXP4064; Last Rx:76Zec2241 Ordered   15  Mirtazapine 7 5 MG Oral Tablet; TAKE 1 TABLET AT BEDTIME; Therapy: 04GBW0983 to (Evaluate:13Nov2016)  Requested for: 82GNI2220; Last    Rx:14Oct2016 Ordered   16  Nabumetone 750 MG Oral Tablet; Therapy: 53TQK7021 to Recorded   17  Nicotine 14 MG/24HR Transdermal Patch 24 Hour; Use one patch per day for 6 weeks      Then use 7mg patch for 2 weeks; Therapy: 25EYK6587 to (Last Rx:23Auz7277)  Requested for: 14Oct2016 Ordered   18  Omeprazole 20 MG Oral Tablet Delayed Release; Take 1 tablet daily; Therapy: 72NIT6023 to (Evaluate:02Por9936)  Requested for: 05LWP9863; Last    Rx:14Oct2016 Ordered   19  Oxycodone-Acetaminophen 5-325 MG Oral Tablet; TAKE 1 TABLET EVERY 12 HOURS AS    NEEDED FOR PAIN;    Therapy: 34MGJ7736 to (Evaluate:26Nov2016); Last Rx:11Nov2016 Ordered   20  Oxycodone-Acetaminophen 5-325 MG Oral Tablet; Therapy: 87NWD9808 to Recorded   21  TraMADol HCl - 50 MG Oral Tablet; TAKE 1 TABLET EVERY 12 HOURS AS NEEDED; Therapy: 25SHA7941 to (Evaluate:46Frx9876); Last Rx:11Nov2016 Ordered   22  TraMADol HCl - 50 MG Oral Tablet; TAKE 1 TABLET EVERY 12 HOURS AS NEEDED; Therapy: 38PCB8239 to (Evaluate:28Oct2016); Last Rx:14Oct2016 Ordered    Allergies    1  Cipro    Future Appointments    Date/Time Provider Specialty Site   12/20/2016 01:40 PM LAST Quintanilla   Cardiology [address]   01/03/2017 07:25 AM Specialty Clinic, Urology  [facility]   12/13/2016 09:30 AM Lana Cross [address] [place] [address] Rd     Signatures   Electronically signed by : Pierrette Cabot, ; Nov 23 2016  5:56PM EST                       (Author)    Electronically signed by : Shane Holman DO; Dec  9 2016  9:20AM EST                       (Author)    Electronically signed by : LAST Felder ; Dec 11 2016  7:58PM EST                       (Author)

## 2018-01-16 NOTE — MISCELLANEOUS
Provider Comments  Provider Comments:   pt was a no show today      Signatures   Electronically signed by : Leola Gifford, ; Dec  9 2016  9:46AM EST                       (Author)

## 2018-01-16 NOTE — PROCEDURES
Procedures by Jesika Asif RN  at 11/15/2016  3:36 PM      Author:  Jesika Asif RN Service:  Interventional Radiology  Author Type:  Registered Nurse    Filed:  11/15/2016  3:43 PM Date of Service:  11/15/2016  3:36 PM Status:  Attested    :  Jesika Asif RN (Registered Nurse)  Cosigner:  Alysa Rose MD at 11/15/2016  3:54 PM      Procedure Orders:       1  Insert PICC line [[id number]] ordered by Jesika Asif RN at 11/15/16 1536               Attestation signed by Alysa Rose MD at 11/15/2016  3:54 PM (Updated)           I have reviewed the notes, assessments, and/or procedures performed by myself and Patty Whaley RN, I concur with her/his documentation of Megan Rosana Roberto    O cm external length  PICC Line Insertion  Date/Time: 11/15/2016 3:36 PM  Performed by: Natasha Virgen by: Shell Morataya     Other Assisting Provider: Yes (comment)    Consent:     Consent obtained:  Written and emergent situation    Consent given by:  Healthcare agent    Risks discussed:  Incorrect placement, bleeding and infection    Alternatives discussed:  Delayed treatment  Universal protocol:     Procedure explained and questions answered to patient or proxy's satisfaction: yes      Relevant documents present and verified: yes      Test results available and properly labeled: yes       Imaging studies available: yes      Required blood products, implants, devices, and special equipment available: yes      Site/side marked: yes      Immediately prior to procedure, a time out was called: yes      Patient identity confirmed:  Arm band and hospital-assigned identification number  Pre-procedure details:     Hand hygiene: Hand hygiene performed prior to insertion      Sterile barrier technique:  All elements of maximal sterile technique followed      Skin preparation:  ChloraPrep  Indications:     PICC line indications: vascular access and other (comment)      PICC line indications comment:  Critical care meds  Sedation:     Sedation type: Anxiolysis  Anesthesia (see MAR for exact dosages): Anesthesia method:  None  Procedure details:     Location:  [place]    Vessel type: vein      Laterality:  Right    Approach: percutaneous technique used      Patient position:  Flat    Procedural supplies:  Double lumen    Catheter size:  5 Fr    Ultrasound guidance: yes      Sterile ultrasound techniques: Sterile gel and sterile probe covers were used      Number of attempts:  1    Successful placement: yes      Total catheter length (cm):  37    Max flow rate:  999 ml/hour   Post-procedure details:     Assessment:  Blood return through all ports and placement verification pending x-ray result    Patient tolerance of procedure:   Tolerated well, no immediate complications                     Received for:Provider  [facility]   Nov 15 2016  3:55PM [facility] Standard Time

## 2018-01-16 NOTE — PROCEDURES
Procedures by Juan Potter PA-C at 11/13/2016  3:08 PM      Author:  Juan Potter PA-C Service:  Critical Care/ICU Author Type:  Physician Assistant    Filed:  11/13/2016  3:25 PM Date of Service:  11/13/2016  3:08 PM Status:  Signed    :  Juan Potter PA-C (Physician Assistant)  Cosigner:  Camila Cox DO at 11/13/2016  4:09 PM      Procedure Orders:       1  INTUBATION [[id number]] ordered by Juan Potter PA-C at 11/13/16 1521                 Post-procedure Diagnoses:       1  CAP (community acquired pneumonia) [J18 9]       2   Acute respiratory failure with hypoxia [J96 01]                   Intubation  Date/Time: 11/13/2016 3:08 PM  Performed by: Karyna Ashley  Authorized by: Karyna Ashley     Patient location:  Bedside  Other Assisting Provider:  Yes (comment) (Dr Juan Calderon at Bedside)    Consent:     Consent obtained:  Verbal and written    Consent given by:  Patient    Risks discussed:  Aspiration, brain injury, dental trauma, laryngeal injury, pneumothorax, hypoxia, death and bleeding    Alternatives discussed:  Delayed treatment and observation  Universal protocol:     Procedure explained and questions answered to patient or proxy's satisfaction: yes      Relevant documents present and verified: yes      Test results available and properly labeled: yes       Imaging studies available: yes      Required blood products, implants, devices, and special equipment available: yes      Site/side marked: yes      Immediately prior to procedure, a time out was called: yes      Patient identity confirmed:  Verbally with patient, hospital-assigned identification number and arm band  Pre-procedure details:     Patient status:  Awake    Mallampati score:  1    Pretreatment medications:  Fentanyl and etomidate    Paralytics:  Succinylcholine  Indications:     Indications for intubation: respiratory distress, respiratory failure, airway protection and hypoxemia    Procedure details:     Preoxygenation:  Bag valve mask    CPR in progress: no      Intubation method:  Oral    Oral intubation technique:  Direct    Laryngoscope blade: Mac 3    Tube size (mm):  8 0    Tube type:  Cuffed    Number of attempts:  1    Cricoid pressure: yes      Tube visualized through cords: yes    Placement assessment:     ETT to lip:  23    Tube secured with:  ETT lutz    Breath sounds:  Equal    Placement verification: chest rise, condensation, direct visualization, equal breath sounds and ETCO2 detector      CXR findings:  ETT in proper place  Post-procedure details:     Patient tolerance of procedure:   Tolerated well, no immediate complications                     Received for:Provider  [facility]   Nov 13 2016  4:09PM [facility] Standard Time

## (undated) DEVICE — LASER FIBER HOLMIUM 272MICRON

## (undated) DEVICE — PACK TUR

## (undated) DEVICE — GLOVE INDICATOR PI UNDERGLOVE SZ 7.5 BLUE

## (undated) DEVICE — CATH URET .038 10FR 50CM DUAL LUMEN

## (undated) DEVICE — SCD SEQUENTIAL COMPRESSION COMFORT SLEEVE MEDIUM KNEE LENGTH: Brand: KENDALL SCD

## (undated) DEVICE — GLOVE SRG BIOGEL ECLIPSE 7.5

## (undated) DEVICE — GUIDEWIRE STRGHT TIP 0.035 IN  SOLO PLUS

## (undated) DEVICE — SHEATH URETERAL ACCESS 12/14FR 35CM PROXIS

## (undated) DEVICE — UROCATCH BAG